# Patient Record
Sex: FEMALE | Race: WHITE | NOT HISPANIC OR LATINO | Employment: UNEMPLOYED | URBAN - METROPOLITAN AREA
[De-identification: names, ages, dates, MRNs, and addresses within clinical notes are randomized per-mention and may not be internally consistent; named-entity substitution may affect disease eponyms.]

---

## 2022-08-31 RX ORDER — SODIUM CHLORIDE 9 MG/ML
20 INJECTION, SOLUTION INTRAVENOUS ONCE
Status: CANCELLED | OUTPATIENT
Start: 2022-09-07

## 2022-10-27 ENCOUNTER — RADIATION ONCOLOGY FOLLOW-UP (OUTPATIENT)
Dept: RADIATION ONCOLOGY | Facility: CLINIC | Age: 62
End: 2022-10-27
Attending: RADIOLOGY

## 2022-10-27 VITALS
OXYGEN SATURATION: 95 % | WEIGHT: 129.41 LBS | HEIGHT: 65 IN | SYSTOLIC BLOOD PRESSURE: 144 MMHG | BODY MASS INDEX: 21.56 KG/M2 | TEMPERATURE: 98.1 F | HEART RATE: 94 BPM | DIASTOLIC BLOOD PRESSURE: 82 MMHG

## 2022-10-27 DIAGNOSIS — C50.111 MALIGNANT NEOPLASM OF CENTRAL PORTION OF RIGHT BREAST IN FEMALE, ESTROGEN RECEPTOR POSITIVE (HCC): Primary | ICD-10-CM

## 2022-10-27 DIAGNOSIS — Z17.0 MALIGNANT NEOPLASM OF CENTRAL PORTION OF RIGHT BREAST IN FEMALE, ESTROGEN RECEPTOR POSITIVE (HCC): Primary | ICD-10-CM

## 2022-10-27 NOTE — PROGRESS NOTES
Janelle Foster 1960 is a 65 y o  female History of  invasive ductal carcinoma, grade 2, ER/WA + HER2+  She was seen by Dr Slick Edmonds and Dr Karel Landaverde and Taxotere, carboplatin, Herceptin, and Perjeta was recommended followed by surgery  She started this 5/4/22  Seen for consult in Radiation Oncology on 8/16/22 9/20/22  Dr Karel Landaverde  Has completed 6 cycles of TCHP  Is receiving herceptin plus perjeta  Lumpectomy scheduled  9/30/22  Tissue Exam  A  Right breast lumpectomy:  - Invasive mammary carcinoma of no special type, at least 18 mm in greatest dimension, with associated ductal carcinoma in-situ (DCIS)  See comment and synoptic report  - Partial tumor regression noted  B  Right breast new inferior margin:  - Benign breast tissue  C  Right breast new lateral margin:  - Benign breast tissue  D  Right breast new superior margin:  - Benign breast tissue  E  Right breast new posterior margin:  - Benign breast tissue  F  Right breast new inferior margin 2:  - Benign breast tissue  G  Right breast new medial margin:  - Benign breast tissue  H  Alpha node #1 right axilla:  - Three benign lymph nodes (0/3)  I  Alpha node #6 right axilla:  - One benign lymph node (0/1)  J  Alpha node #2 right axilla:  - One benign lymph node (0/1)  K  Alpha node #3 right axilla:  - One benign lymph node (0/1)  L  Alpha node #4 right axilla:  - One benign lymph node (0/1)  M  Alpha node #5 right axilla:  - One benign lymph node (0/1)  Comment: No discrete mass was noted grossly, however residual invasive or in-situ carcinoma involves 13 of 23 blocks on the lumpectomy specimen  The largest measurable dimension of invasive carcinoma is 18 mm  Repeat hormone receptor studies are pending    1   Ancillary Studies:  Performed on original biopsy material, [id number], and reported to be:     - ER:  70-80% / Positive     - WA:  70-80% / Positive - HER2 (IHC):  3+ / Positive     10/18/22  Dr Jodee Atkisnon  Seen for surgery f/u  Margins clean Lymph nodes negative  Healing well without infection  Has appointment with radiation oncology scheduled  10/19/22  Last Infusion    Upcomin22  Hem/Onc  22  Infusion  23  Surgical Oncology                        Follow up visit     Oncology History   Malignant neoplasm of central portion of right breast in female, estrogen receptor positive ([facility] [address] )   3/29/2022 Initial Diagnosis    Malignant neoplasm of central portion of right breast in female, estrogen receptor positive ([facility] [address] )     3/29/2022 Biopsy    Final Diagnosis   A  Breast, Right, ultrasound guided needle biopsy,  12 periareolar 3 passes 12g marquee:  - Invasive mammary carcinoma of no special type (ductal)  -- Jojo histologic grade 2 of 3 (total score: 7 of 9)        * Glandular (acinar) Tubular Differentiation 10-75%, score 2        * Nuclear Pleomorphism 3 of 3, score 3         * Mitotic Rate 4-7/mm2, (8-14 mitoses/10HPF; 8 mitoses/10 HPF), score 2     -- Confirmed by tumor cell immunophenotype:        * Positive: GATA3, E-cadherin, P120 (membranous)  * Negative: p63, calponin-B, SMMHC  -- Invasive carcinoma involves 3 of 3 submitted core biopsies, max  Dimension= 14 mm     -- Estrogen, Progesterone & HER2 receptor studies pending, to be described in an addendum   - Ductal carcinoma in-situ (DCIS): Present; minor component (< 10% of total tumor)  -- Architectural Patterns: Solid, comedo  -- Nuclear grade: III (high)  -- Necrosis: Present, central comedo necrosis identified  - Lymphovascular invasion: Not identified  - Microcalcifications: Present, associated with invasive carcinoma and DCIS  - Best representative tumor block: A1      -- Sufficient tumor present for        Agendia Mammaprint/Blueprint (1 cm2 of invasive tumor in aggregate): Yes  MI Profile/Foundation One (at least 5 x 5 mm of tumor): Yes  ER 70-80%, DC 70-80%, HER2 3+ positive     4/6/2022 -  Cancer Staged    Staging form: Breast, AJCC 8th Edition  - Clinical stage from 4/6/2022: Stage IB (cT2, cN0, cM0, G2, ER+, DC+, HER2+) - Signed by Ruben Childs MD on 4/6/2022  Stage prefix: Initial diagnosis  Method of lymph node assessment: Clinical  Histologic grading system: 3 grade system       5/4/2022 -  Chemotherapy    Pegfilgrastim-bmez (ZIEXTENZO), 6 mg, Subcutaneous, Once, 6 of 6 cycles  Administration: 6 mg (5/5/2022), 6 mg (5/26/2022), 6 mg (6/16/2022), 6 mg (7/7/2022), 6 mg (7/28/2022), 6 mg (8/18/2022)  fosaprepitant (EMEND) IVPB, 150 mg, Intravenous, Once, 6 of 6 cycles  Administration: 150 mg (5/4/2022), 150 mg (5/25/2022), 150 mg (6/15/2022), 150 mg (7/6/2022), 150 mg (7/27/2022), 150 mg (8/17/2022)  pertuzumab (PERJETA) IVPB, 840 mg, Intravenous, Once, 9 of 18 cycles  Administration: 840 mg (5/4/2022), 420 mg (5/25/2022), 420 mg (9/7/2022), 420 mg (6/15/2022), 420 mg (7/6/2022), 420 mg (7/27/2022), 420 mg (8/17/2022), 420 mg (9/28/2022), 420 mg (10/19/2022)  CARBOplatin (PARAPLATIN) IVPB (GOG AUC DOSING), 588 6 mg, Intravenous, Once, 6 of 6 cycles  Administration: 588 6 mg (5/4/2022), 588 6 mg (5/25/2022), 588 6 mg (6/15/2022), 582 6 mg (7/6/2022), 588 6 mg (7/27/2022), 582 6 mg (8/17/2022)  trastuzumab-qyyp (TRAZIMERA) chemo infusion, 6 mg/kg = 330 mg (100 % of original dose 6 mg/kg), Intravenous, Once, 2 of 11 cycles  Dose modification: 6 mg/kg (original dose 6 mg/kg, Cycle 8), 6 mg/kg (original dose 6 mg/kg, Cycle 9)  Administration: 330 mg (9/28/2022), 330 mg (10/19/2022)  DOCEtaxel (TAXOTERE) chemo infusion, 75 mg/m2 = 120 mg, Intravenous, Once, 6 of 6 cycles  Administration: 120 mg (5/4/2022), 120 mg (5/25/2022), 120 mg (6/15/2022), 120 mg (7/6/2022), 120 mg (7/27/2022), 120 mg (8/17/2022)  trastuzumab (HERCEPTIN) chemo infusion, 439 mg, Intravenous, Once, 7 of 7 cycles  Administration: 450 mg (5/4/2022), 330 mg (5/25/2022), 330 mg (9/7/2022), 330 mg (6/15/2022), 330 mg (7/6/2022), 330 mg (7/27/2022), 330 mg (8/17/2022)     10/18/2022 -  Cancer Staged    Staging form: Breast, AJCC 8th Edition  - Pathologic stage from 10/18/2022: No Stage Recommended (ypT1c, pN0(sn), cM0, G2, ER+, [place]+, HER2+) - Signed by Renzo Cochran MD on 10/18/2022  Stage prefix: Post-therapy  Method of lymph node assessment: [place] lymph node biopsy  Histologic grading system: 3 grade system        Radiation                 Review of Systems:  Review of Systems   Constitutional: Positive for activity change (r/t fatigue and chemotherapy) and fatigue (fatigue improviong)  Negative for unexpected weight change  HENT: Positive for nosebleeds  Negative for sore throat  Eyes: Negative  Left eye cataract surgery with IOL   Respiratory: Negative for shortness of breath  Wheezing: controlled with allergy shots  Cardiovascular: Negative  Negative for chest pain and palpitations  Gastrointestinal: Positive for diarrhea (occasional)  Negative for abdominal distention and nausea  Endocrine: Positive for heat intolerance  Genitourinary: Positive for frequency (states she drinks a lot)  Negative for dysuria and urgency  Nocturia 2x/night   Musculoskeletal: Negative for back pain  Right big toe is numb to touch, recent injury   Skin: Negative  Bilateral lower arms are itchy  Relieved with OTC lotions  H/o dry skin   Allergic/Immunologic: Positive for environmental allergies, food allergies and immunocompromised state  Neurological: Positive for numbness (numbness in right big toe)  Negative for tremors, weakness and headaches  Hematological: Negative  Does not bruise/bleed easily  Psychiatric/Behavioral: Negative for self-injury         Clinical Trial: no    Teaching    Covid Vaccine Status vaccinated x5    Health Maintenance   Topic Date Due   • Hepatitis C Screening  Never done   • HIV Screening  Never done   • Annual Physical  Never done   • Cervical Cancer Screening  Never done   • Colorectal Cancer Screening  Never done   • Osteoporosis Screening  Never done   • Pneumococcal Vaccine: Pediatrics (0 to 5 Years) and At-Risk Patients (6 to 59 Years) (3 - PPSV23 or PCV20) 10/06/2020   • COVID-19 Vaccine (4 - Booster for Lars Pheasant series) 03/01/2022   • Breast Cancer Screening: Mammogram  03/29/2023   • Depression Screening  08/16/2023   • BMI: Adult  10/21/2023   • DTaP,Tdap,and Td Vaccines (2 - Td or Tdap) 11/16/2028   • Influenza Vaccine  Completed   • HIB Vaccine  Aged Out   • Hepatitis B Vaccine  Aged Out   • IPV Vaccine  Aged Out   • Hepatitis A Vaccine  Aged Out   • Meningococcal ACWY Vaccine  Aged Out   • HPV Vaccine  Aged Out     Patient Active Problem List   Diagnosis   • Malignant neoplasm of central portion of right breast in female, estrogen receptor positive ([address] )   • Chemotherapy induced neutropenia ([address] )   • Chemotherapy-induced nausea   • Port-A-Cath in place   • 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[address] )   • S/P chemotherapy, time since less than 4 weeks     Past Medical History:   Diagnosis Date   • Diarrhea     occasional   • Port-A-Cath in place     R side chest     Past Surgical History:   Procedure Laterality Date   • BREAST BIOPSY Right 03/29/2022    IDC   • BREAST LUMPECTOMY Right 9/30/2022    Procedure: ML BREAST;  Surgeon: Aileen Chávez MD;  Location: [facility];  Service: Surgical Oncology   • CATARACT EXTRACTION Left 2019    with lens implant   • IR PORT PLACEMENT  4/20/2022   • LYMPH NODE BIOPSY Right 9/30/2022    Procedure: SLN BX;  Surgeon: Aileen Chávez MD;  Location: [facility];  Service: Surgical Oncology   • MANDIBLE SURGERY      jaw surgery for crooked jaw 1993, 1998   • ROTATOR CUFF REPAIR Right 2020   • US BREAST ML  NEEDLE LOC RIGHT Right 3/29/2022   • US GUIDED BREAST BIOPSY RIGHT COMPLETE Right 3/29/2022     Family History   Problem Relation Age of Onset   • No Known Problems Mother    • Prostate cancer Father    • No Known Problems Sister    • No Known Problems Daughter    • No Known Problems Daughter    • Lung cancer Maternal Aunt    • No Known Problems Maternal Aunt    • No Known Problems Paternal Aunt    • No Known Problems Paternal Aunt    • No Known Problems Maternal Grandmother    • No Known Problems Maternal Grandfather    • Breast cancer Paternal Grandmother [de-identified]   • No Known Problems Paternal Grandfather    • Cancer Cousin         worked in [employer], 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 ovarian cancer   • Stomach cancer Other      Social History     Socioeconomic History   • Marital status: /Civil Union     Spouse name: Not on file   • Number of children: Not on file   • Years of education: Not on file   • Highest education level: Not on file   Occupational History   • Not on file   Tobacco Use   • Smoking status: Never Smoker   • Smokeless tobacco: Never Used   Vaping Use   • Vaping Use: Never used   Substance and Sexual Activity   • Alcohol use: Not Currently   • Drug use: Never   • Sexual activity: Not Currently   Other Topics Concern   • Not on file   Social History Narrative   • Not on file     Social Determinants of Health     Financial Resource Strain: Not on file   Food Insecurity: Not on file   Transportation Needs: Not on file   Physical Activity: Not on file   Stress: Not on file   Social Connections: Not on file   Intimate Partner Violence: Not on file   Housing Stability: Not on file       Current Outpatient Medications:   •  albuterol (ACCUNEB) 1 25 MG/3ML nebulizer solution, Take 1 25 mg by nebulization every 6 (six) hours as needed for wheezing, Disp: , Rfl:   •  albuterol (PROVENTIL HFA,VENTOLIN HFA) 90 mcg/act inhaler, Ventolin HFA 90 mcg/actuation aerosol inhaler  TAKE 2 INHALATIONS EVERY 4-6 HOURS AS NEEDEDF OR BREATHING, Disp: , Rfl:   •  lidocaine-prilocaine (EMLA) cream, Apply topically as needed for mild pain, Disp: 30 g, Rfl: 1  •  sodium chloride (OCEAN) 0 65 % nasal spray, Saline Nasal Mist, Disp: , Rfl:   •  traMADol (ULTRAM) 50 mg tablet, Take 1 tablet (50 mg total) by mouth every 8 (eight) hours as needed for moderate pain, Disp: 9 tablet, Rfl: 0  Allergies   Allergen Reactions   • Nuts - Food Allergy Hives     BRAZILIAN NUTS       • Other Other (See Comments)     Mold/gets sore throat     There were no vitals filed for this visit

## 2022-10-31 NOTE — PROGRESS NOTES
Kim [facility]  1960  [id number]    Radiation Oncology Follow Up Consult     March 8, 2022:  Bilateral screening mammograms showing a high density irregularly shaped mass with spiculated margins in the retroareolar region right with associated fine calcifications  BI-RADS category 0    March 29, 2022: Right breast mammogram and ultrasound showing again a 2 4 cm irregular hypoechoic mass at the 12 o'clock position of the right breast at anterior depth, no lymphadenopathy  Biopsy on that date showing grade 2 invasive ductal carcinoma, estrogen receptor 80%, progesterone receptor 80%, HER2 positive 3+    May 4 through August 17, 2022:  Neoadjuvant chemotherapy with Taxol, carboplatin, trastuzumab and pertuzumab  (TCHP)    September 30, 2022:  Right breast lumpectomy and sentinel node biopsy by Dr Keely Tejeda  Pathology showed grade 2 invasive ductal carcinoma measuring 1 8 cm in a single focus with treatment effect, no lymphovascular invasion seen, all margins greater than 10 mm, associated high-grade ductal carcinoma in-situ present, 8 negative sentinel nodes    History of present illness: Ms Heath Ambriz is a 71-year-old postmenopausal woman who self palpated a mass the upper central area of the right breast on February 27, 2022  She had bilateral mammograms on March 8, 2022 which confirmed presence of a new spiculated mass in the superior circumareolar region of the right breast at 12:00 p m     Additional imaging with biopsy was performed on May 29, 2022 and pathology showed a grade 2 invasive ductal carcinoma that was estrogen receptor and progesterone receptor positive, Her 2 overexpressed  She met with Dr Keely Tejeda to discuss surgical options of mastectomy versus breast conserving surgery, particularly in light of the retroareolar location of the tumor  A port was placed in the right chest wall on April 20, 2022  She met with Dr Nic Uribe in Medical Oncology    Metastatic workup with CT scan of the chest, abdomen and pelvis and bone scan showed no overt evidence of metastatic disease  She underwesnt neoadjuvant chemotherapy consisting TCHP for 6 cycles  She will continue anti-HER2 agents for full 12 months  She therefore presented with clinical stage cT2 cN0 cM0 grade 2 (stage IA) triple positive invasive ductal carcinoma of the right breast   She tolerated chemotherapy relatively well with some complaints of fatigue and minimal neuropathy  On September 30, 2022 she underwent a lumpectomy and sentinel node biopsy by Dr Shankar Montgomery  She states that she is recovering well postoperatively other than some swelling in the axilla due to postoperative seroma  Pathology showed a 1 8 cm residual grade 2 invasive ductal carcinoma with treatment effect, with no lymphovascular invasion, associated high-grade DCIS and widely negative margins of excision  Repeat receptors showed estrogen receptor 95%, progesterone receptor 95% and HER2 positive by FISH  Eight sentinel nodes were identified all of which were negative for metastasis  She returns for re-evaluation to discuss adjuvant radiation for pathologic stage ypT1c ypN0 cM0, prognostic stage IA,         Oncology History   Malignant neoplasm of central portion of right breast in female, estrogen receptor positive ([facility] [address] )   3/29/2022 Initial Diagnosis    Malignant neoplasm of central portion of right breast in female, estrogen receptor positive ([facility] [address] )     3/29/2022 Biopsy    Final Diagnosis   A  Breast, Right, ultrasound guided needle biopsy,  12 periareolar 3 passes 12g marquee:  - Invasive mammary carcinoma of no special type (ductal)      -- [place] histologic grade 2 of 3 (total score: 7 of 9)        * Glandular (acinar) Tubular Differentiation 10-75%, score 2        * Nuclear Pleomorphism 3 of 3, score 3         * Mitotic Rate 4-7/mm2, (8-14 mitoses/10HPF; 8 mitoses/10 HPF), score 2     -- Confirmed by tumor cell immunophenotype:        * Positive: GATA3, E-cadherin, P120 (membranous)  * Negative: p63, calponin-B, SMMHC  -- Invasive carcinoma involves 3 of 3 submitted core biopsies, max  Dimension= 14 mm     -- Estrogen, Progesterone & HER2 receptor studies pending, to be described in an addendum   - Ductal carcinoma in-situ (DCIS): Present; minor component (< 10% of total tumor)  -- Architectural Patterns: Solid, comedo  -- Nuclear grade: III (high)  -- Necrosis: Present, central comedo necrosis identified  - Lymphovascular invasion: Not identified  - Microcalcifications: Present, associated with invasive carcinoma and DCIS  - Best representative tumor block: A1      -- Sufficient tumor present for        Agendia Mammaprint/Blueprint (1 cm2 of invasive tumor in aggregate): Yes  MI Profile/Foundation One (at least 5 x 5 mm of tumor): Yes          ER 70-80%, IL 70-80%, HER2 3+ positive     4/6/2022 -  Cancer Staged    Staging form: Breast, AJCC 8th Edition  - Clinical stage from 4/6/2022: Stage IB (cT2, cN0, cM0, G2, ER+, IL+, HER2+) - Signed by Bishop Adriana MD on 4/6/2022  Stage prefix: Initial diagnosis  Method of lymph node assessment: Clinical  Histologic grading system: 3 grade system       5/4/2022 -  Chemotherapy    Pegfilgrastim-bmez (ZIEXTENZO), 6 mg, Subcutaneous, Once, 6 of 6 cycles  Administration: 6 mg (5/5/2022), 6 mg (5/26/2022), 6 mg (6/16/2022), 6 mg (7/7/2022), 6 mg (7/28/2022), 6 mg (8/18/2022)  fosaprepitant (EMEND) IVPB, 150 mg, Intravenous, Once, 6 of 6 cycles  Administration: 150 mg (5/4/2022), 150 mg (5/25/2022), 150 mg (6/15/2022), 150 mg (7/6/2022), 150 mg (7/27/2022), 150 mg (8/17/2022)  pertuzumab (PERJETA) IVPB, 840 mg, Intravenous, Once, 9 of 18 cycles  Administration: 840 mg (5/4/2022), 420 mg (5/25/2022), 420 mg (9/7/2022), 420 mg (6/15/2022), 420 mg (7/6/2022), 420 mg (7/27/2022), 420 mg (8/17/2022), 420 mg (9/28/2022), 420 mg (10/19/2022)  CARBOplatin (PARAPLATIN) IVPB ([facility] AUC DOSING), 588 6 mg, Intravenous, Once, 6 of 6 cycles  Administration: 588 6 mg (5/4/2022), 588 6 mg (5/25/2022), 588 6 mg (6/15/2022), 582 6 mg (7/6/2022), 588 6 mg (7/27/2022), 582 6 mg (8/17/2022)  trastuzumab-qyyp (TRAZIMERA) chemo infusion, 6 mg/kg = 330 mg (100 % of original dose 6 mg/kg), Intravenous, Once, 2 of 11 cycles  Dose modification: 6 mg/kg (original dose 6 mg/kg, Cycle 8), 6 mg/kg (original dose 6 mg/kg, Cycle 9)  Administration: 330 mg (9/28/2022), 330 mg (10/19/2022)  DOCEtaxel (TAXOTERE) chemo infusion, 75 mg/m2 = 120 mg, Intravenous, Once, 6 of 6 cycles  Administration: 120 mg (5/4/2022), 120 mg (5/25/2022), 120 mg (6/15/2022), 120 mg (7/6/2022), 120 mg (7/27/2022), 120 mg (8/17/2022)  trastuzumab (HERCEPTIN) chemo infusion, 439 mg, Intravenous, Once, 7 of 7 cycles  Administration: 450 mg (5/4/2022), 330 mg (5/25/2022), 330 mg (9/7/2022), 330 mg (6/15/2022), 330 mg (7/6/2022), 330 mg (7/27/2022), 330 mg (8/17/2022)     10/18/2022 -  Cancer Staged    Staging form: Breast, AJCC 8th Edition  - Pathologic stage from 10/18/2022: No Stage Recommended (ypT1c, pN0(sn), cM0, G2, ER+, WA+, HER2+) - Signed by Augusto Lee MD on 10/18/2022  Stage prefix: Post-therapy  Method of lymph node assessment: [place] lymph node biopsy  Histologic grading system: 3 grade system        Radiation                 Patient Active Problem List   Diagnosis   • Malignant neoplasm of central portion of right breast in female, estrogen receptor positive ([facility] [address] )   • Chemotherapy induced neutropenia (HCC)   • Chemotherapy-induced nausea   • Port-A-Cath in place   • 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[facility] [address] )   • S/P chemotherapy, time since less than 4 weeks    Cancer Staging  Malignant neoplasm of central portion of right breast in female, estrogen receptor positive ([facility] [address] )  Staging form: Breast, AJCC 8th Edition  - Clinical stage from 4/6/2022: Stage IB (cT2, cN0, cM0, G2, ER+, WA+, HER2+) - Signed by Augusto Lee MD on 4/6/2022  Stage prefix: Initial diagnosis  Method of lymph node assessment: Clinical  Histologic grading system: 3 grade system  - Pathologic stage from 10/18/2022: No Stage Recommended (ypT1c, pN0(sn), cM0, G2, ER+, MN+, HER2+) - Signed by Albaro Grace MD on 10/18/2022  Stage prefix: Post-therapy  Method of lymph node assessment: [place] lymph node biopsy  Histologic grading system: 3 grade system    Past Medical History:   Diagnosis Date   • Breast cancer ([facility] [address] )    • Diarrhea     occasional   • Port-A-Cath in place     R side chest     Social History     Socioeconomic History   • Marital status: /Civil Union     Spouse name: Not on file   • Number of children: Not on file   • Years of education: Not on file   • Highest education level: Not on file   Occupational History   • Not on file   Tobacco Use   • Smoking status: Never Smoker   • Smokeless tobacco: Never Used   Vaping Use   • Vaping Use: Never used   Substance and Sexual Activity   • Alcohol use: Not Currently   • Drug use: Never   • Sexual activity: Not Currently   Other Topics Concern   • Not on file   Social History Narrative   • Not on file     Social Determinants of Health     Financial Resource Strain: Not on file   Food Insecurity: Not on file   Transportation Needs: Not on file   Physical Activity: Not on file   Stress: Not on file   Social Connections: Not on file   Intimate Partner Violence: Not on file   Housing Stability: Not on file      Family History   Problem Relation Age of Onset   • No Known Problems Mother    • Prostate cancer Father    • No Known Problems Sister    • No Known Problems Daughter    • No Known Problems Daughter    • Lung cancer Maternal Aunt    • No Known Problems Maternal Aunt    • No Known Problems Paternal Aunt    • No Known Problems Paternal Aunt    • No Known Problems Maternal Grandmother    • No Known Problems Maternal Grandfather    • Breast cancer Paternal Grandmother [de-identified]   • No Known Problems Paternal Grandfather    • Cancer Cousin         worked in radiology, 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 ovarian cancer   • Stomach cancer Other      Past Surgical History:   Procedure Laterality Date   • BREAST BIOPSY Right 03/29/2022    IDC   • BREAST LUMPECTOMY Right 9/30/2022    Procedure: ML BREAST;  Surgeon: Raquel Bonilla MD;  Location: [facility];  Service: Surgical Oncology   • CATARACT EXTRACTION Left 2019    with lens implant   • IR PORT PLACEMENT  4/20/2022   • LYMPH NODE BIOPSY Right 9/30/2022    Procedure: SLN BX;  Surgeon: Raquel Bonilla MD;  Location: [facility];  Service: Surgical Oncology   • MANDIBLE SURGERY      jaw surgery for crooked jaw 1993, 1998   • ROTATOR CUFF REPAIR Right 2020   • US BREAST ML  NEEDLE LOC RIGHT Right 3/29/2022   • US GUIDED BREAST BIOPSY RIGHT COMPLETE Right 3/29/2022       Current Outpatient Medications:   •  lidocaine-prilocaine (EMLA) cream, Apply topically as needed for mild pain, Disp: 30 g, Rfl: 1  •  sodium chloride (OCEAN) 0 65 % nasal spray, Saline Nasal Mist, Disp: , Rfl:   •  albuterol (ACCUNEB) 1 25 MG/3ML nebulizer solution, Take 1 25 mg by nebulization every 6 (six) hours as needed for wheezing (Patient not taking: Reported on 10/27/2022), Disp: , Rfl:   •  albuterol (PROVENTIL HFA,VENTOLIN HFA) 90 mcg/act inhaler, Ventolin HFA 90 mcg/actuation aerosol inhaler  TAKE 2 INHALATIONS EVERY 4-6 HOURS AS NEEDEDF OR BREATHING (Patient not taking: Reported on 10/27/2022), Disp: , Rfl:   •  traMADol (ULTRAM) 50 mg tablet, Take 1 tablet (50 mg total) by mouth every 8 (eight) hours as needed for moderate pain (Patient not taking: Reported on 10/27/2022), Disp: 9 tablet, Rfl: 0  Allergies   Allergen Reactions   • Nuts - Food Allergy Hives     BRAZILIAN NUTS       • Other Other (See Comments)     Mold/gets sore throat       Review of Systems:  Constitutional: Positive for activity change (r/t fatigue and chemotherapy) and fatigue (fatigue improviong)  Negative for unexpected weight change  HENT: Positive for nosebleeds   Negative for sore throat  Eyes: Negative  Left eye cataract surgery with IOL   Respiratory: Negative for shortness of breath  Wheezing: controlled with allergy shots  Cardiovascular: Negative  Negative for chest pain and palpitations  Gastrointestinal: Positive for diarrhea (occasional)  Negative for abdominal distention and nausea  Endocrine: Positive for heat intolerance  Genitourinary: Positive for frequency (states she drinks a lot)  Negative for dysuria and urgency  Nocturia 2x/night   Musculoskeletal: Negative for back pain  Right big toe is numb to touch, recent injury   Skin: Negative  Bilateral lower arms are itchy  Relieved with OTC lotions  H/o dry skin   Allergic/Immunologic: Positive for environmental allergies, food allergies and immunocompromised state  Neurological: Positive for numbness (numbness in right big toe)  Negative for tremors, weakness and headaches  Hematological: Negative  Does not bruise/bleed easily  Psychiatric/Behavioral: Negative for self-injury       Physical Exam:  Physical Exam  Vitals and nursing note reviewed  Constitutional:       General: She is not in acute distress  Appearance: Normal appearance  HENT:      Head: Normocephalic and atraumatic  Eyes:      General: No scleral icterus  Extraocular Movements: Extraocular movements intact  Pupils: Pupils are equal, round, and reactive to light  Pulmonary:      Effort: Pulmonary effort is normal  No respiratory distress  Chest:   Breasts:      Right: No swelling, inverted nipple, mass, nipple discharge, skin change, axillary adenopathy or supraclavicular adenopathy  Left: Normal  No swelling, inverted nipple, mass, nipple discharge, skin change, axillary adenopathy or supraclavicular adenopathy              Comments: Right breast:  Well-healing lumpectomy incision, well-healing sentinel node incision with 2 cm palpable firm seroma in the axilla  Abdominal: General: There is no distension  Tenderness: There is no abdominal tenderness  Musculoskeletal:      Right lower leg: No edema  Left lower leg: No edema  Lymphadenopathy:      Cervical: No cervical adenopathy  Upper Body:      Right upper body: No supraclavicular or axillary adenopathy  Left upper body: No supraclavicular or axillary adenopathy  Skin:     Coloration: Skin is not jaundiced  Neurological:      General: No focal deficit present  Mental Status: She is alert and oriented to person, place, and time  Cranial Nerves: No cranial nerve deficit  Motor: No weakness  Psychiatric:         Attention and Perception: Attention normal          Mood and Affect: Mood is anxious  Speech: Speech normal          LABS:    CBC  Diff   Lab Results   Component Value Date/Time    WBC 3 85 (L) 10/19/2022 12:53 PM    HGB 12 1 10/19/2022 12:53 PM    HCT 37 2 10/19/2022 12:53 PM    RBC 3 74 (L) 10/19/2022 12:53 PM     (H) 10/19/2022 12:53 PM    MCHC 32 5 10/19/2022 12:53 PM    MCH 32 4 10/19/2022 12:53 PM    RDW 11 5 (L) 10/19/2022 12:53 PM    MPV 9 1 10/19/2022 12:53 PM    Lab Results   Component Value Date/Time    LYMPHSABS 1 01 10/19/2022 12:53 PM    EOSABS 0 33 10/19/2022 12:53 PM    MONOSABS 0 30 10/19/2022 12:53 PM    BASOSABS 0 07 10/19/2022 12:53 PM        Basic Metabolic Profile    Lab Results   Component Value Date/Time    SODIUM 138 10/19/2022 12:53 PM    CO2 30 10/19/2022 12:53 PM    Lab Results   Component Value Date/Time    BUN 17 10/19/2022 12:53 PM    CREATININE 0 65 10/19/2022 12:53 PM          Discussion/Summary:  Ms Kya Kebede is a 61-year-old postmenopausal woman who presented with a mammographic abnormality in the right breast, biopsy showing triple positive grade 2 invasive ductal carcinoma  She was treated with neoadjuvant chemotherapy and anti-HER2 therapy with a partial clinical and pathologic response to neoadjuvant treatment    She underwent lumpectomy and sentinel node biopsy showing 1 8 cm of residual disease, negative nodes and negative margins of excision  She has been recovering well from surgery with a seroma in the axilla that has not required drainage  She returns for information regarding radiation therapy for a pathologic stage ypT1c ypN0 cM0, prognostic stage I A  I discussed with the patient and her  that radiation is used to eradicate microscopic deposits of disease which may reside in the breast after chemotherapy and surgery  Radiation therefore reduces the risk of local recurrence and contributes to overall survival benefit  She has risk factors for local recurrence including residual disease after neoadjuvant chemotherapy, HER2 positive disease, and the presence of high-grade ductal carcinoma in-situ in the specimen  She does have widely negative margins and negative sentinel nodes  I recommend radiation to the right whole breast with a tumor bed boost   I described the procedure involved in radiation to the right breast including the planning procedure  I described the potential acute and long-term side effects  Acutely she may experience some localized skin irritation, swelling in the breast or fatigue  Late side effects primarily include the possibility of fibrosis formation which can lead to changes in cosmesis or texture  I did explain that we will not be treating any of her lymph nodes as she was both clinically and pathologically node negative  She has extensive questions about lymphedema which I explained are related to the use of axillary surgery but will not be contributed to by the whole breast radiation  The patient was in agreement with proceeding with right breast radiation  Informed consent was reviewed by me and signed by the patient today  CT simulation was scheduled

## 2022-11-01 ENCOUNTER — APPOINTMENT (OUTPATIENT)
Dept: RADIATION ONCOLOGY | Facility: CLINIC | Age: 62
End: 2022-11-01
Attending: RADIOLOGY

## 2022-11-02 RX ORDER — SODIUM CHLORIDE 9 MG/ML
20 INJECTION, SOLUTION INTRAVENOUS ONCE
Status: CANCELLED | OUTPATIENT
Start: 2022-11-09

## 2022-11-04 ENCOUNTER — TELEPHONE (OUTPATIENT)
Dept: SURGICAL ONCOLOGY | Facility: CLINIC | Age: 62
End: 2022-11-04

## 2022-11-04 NOTE — TELEPHONE ENCOUNTER
----- Message from Ruben Childs MD sent at 11/4/2022  3:50 PM EDT -----  yes  ----- Message -----  From: Micha Pepper RN  Sent: 11/4/2022   2:59 PM EDT  To: Ruben Childs MD     Pt would like to know if she may resume her exercising  She is currently having RT

## 2022-11-04 NOTE — TELEPHONE ENCOUNTER
Dr Ant Hu gave the "OK" for Ev Horta to re-start her exercising  Discussed with Ev Horta and instructed her to start out slowly and not to overdue  She understands

## 2022-11-08 ENCOUNTER — OFFICE VISIT (OUTPATIENT)
Dept: HEMATOLOGY ONCOLOGY | Facility: CLINIC | Age: 62
End: 2022-11-08

## 2022-11-08 ENCOUNTER — DOCUMENTATION (OUTPATIENT)
Dept: HEMATOLOGY ONCOLOGY | Facility: CLINIC | Age: 62
End: 2022-11-08

## 2022-11-08 VITALS
OXYGEN SATURATION: 98 % | RESPIRATION RATE: 16 BRPM | WEIGHT: 128 LBS | DIASTOLIC BLOOD PRESSURE: 70 MMHG | BODY MASS INDEX: 21.33 KG/M2 | HEIGHT: 65 IN | SYSTOLIC BLOOD PRESSURE: 118 MMHG | HEART RATE: 76 BPM | TEMPERATURE: 98.1 F

## 2022-11-08 DIAGNOSIS — T45.1X5A CHEMOTHERAPY INDUCED CARDIOMYOPATHY (HCC): ICD-10-CM

## 2022-11-08 DIAGNOSIS — Z95.828 PORT-A-CATH IN PLACE: ICD-10-CM

## 2022-11-08 DIAGNOSIS — Z17.0 MALIGNANT NEOPLASM OF CENTRAL PORTION OF RIGHT BREAST IN FEMALE, ESTROGEN RECEPTOR POSITIVE (HCC): Primary | ICD-10-CM

## 2022-11-08 DIAGNOSIS — C50.111 MALIGNANT NEOPLASM OF CENTRAL PORTION OF RIGHT BREAST IN FEMALE, ESTROGEN RECEPTOR POSITIVE (HCC): Primary | ICD-10-CM

## 2022-11-08 DIAGNOSIS — M81.8 OSTEOPOROSIS DUE TO AROMATASE INHIBITOR: ICD-10-CM

## 2022-11-08 DIAGNOSIS — T38.6X5A OSTEOPOROSIS DUE TO AROMATASE INHIBITOR: ICD-10-CM

## 2022-11-08 DIAGNOSIS — R11.2 CHEMOTHERAPY INDUCED NAUSEA AND VOMITING: ICD-10-CM

## 2022-11-08 DIAGNOSIS — T45.1X5A CHEMOTHERAPY INDUCED NAUSEA AND VOMITING: ICD-10-CM

## 2022-11-08 DIAGNOSIS — I42.7 CHEMOTHERAPY INDUCED CARDIOMYOPATHY (HCC): ICD-10-CM

## 2022-11-08 RX ORDER — LETROZOLE 2.5 MG/1
2.5 TABLET, FILM COATED ORAL DAILY
Qty: 30 TABLET | Refills: 5 | Status: SHIPPED | OUTPATIENT
Start: 2022-11-08

## 2022-11-08 NOTE — PROGRESS NOTES
Reviewed Femara handout with Pt and spouse  Gave Pt a copy of Femara, Arimidex and Aromasin handouts  Instructed possible side effects and importance of reporting any symptoms  Pt signed consent for all 3 AI  Gave pt my direct number to call with any questions

## 2022-11-08 NOTE — PATIENT INSTRUCTIONS
Continuing with Herceptin and Perjeta every 3 weeks  Blood work every 6 weeks  Information on Arimidex, Femara and Aromasin and will start her on Femara 2 5 mg daily  Ordered bone density test   Patient will start taking 1 calcium with vitamin-D and extra 2000 units of vitamin D3 daily  Port flush to continue  Follow-up in 2 months

## 2022-11-08 NOTE — PROGRESS NOTES
HPI:  Patient is here with her   Follow-up visit for T2 NX triple positive right breast cancer that was diagnosed in  March 2022, 2 5 cm tumor mass with negative axilla on ultrasound and negative distant metastatic disease  Path  report was invasive mammary carcinoma, grade 2, ER 70-80% and AR 70-80% and HER2 3+ positive  Patient received 6 cycles of TCHP chemotherapy  Patient had lumpectomy and sentinel lymph node sampling on 09/30/2022 and there was residual 1 8 cm invasive cancer  Margins were clear  All  8 sentinel lymph nodes were negative for metastatic disease  Patient will be receiving radiation  Patient  is being continued on  Herceptin plus Perjeta and is going to be started on aromatase inhibitor   Negative genetic testing for significant variant  No history of cancers in her family  Patient is less nervous and anxious now than before  Has some tiredness  ROS:   Reviewed 12 systems: See symptoms in HPI  Presently no other neurological, cardiac, pulmonary, GI and  symptoms other than listed in HPI  Other symptoms are in HPI  No  fever, chills, bleeding, bone pains, skin rash, weight loss, night sweats, arthritic symptoms,   weakness, numbness, claudication and gait problem  No frequent infections  Not unusually sensitive to heat or cold  No swelling of the ankles  No swollen glands  Patient is less anxious  Physical Exam:  Vitals:  Weight 58 kg  Temperature 98 1 degrees  Respirations 16  Blood pressure 118/70 and oxygen saturation 98%      Alert and oriented and not in distress   Vitals are above   No icterus , no oral thrush, no palpable neck mass, clear   lung fields   to percussion and auscultation , regular heart rate, abdomen  soft and non tender   , no palpable abdominal mass, no ascites, no edema of ankles, no calf tenderness, no focal neurological deficit, no skin rash, no palpable lymphadenopathy in the neck and axillary areas,  no clubbing     Patient is anxious  Performance status 1  No lymphedema  Historical Information   Past Medical History:   Diagnosis Date   • Breast cancer ([address] )    • Diarrhea     occasional   • Port-A-Cath in place     R side chest     Past Surgical History:   Procedure Laterality Date   • BREAST BIOPSY Right 03/29/2022    IDC   • BREAST LUMPECTOMY Right 9/30/2022    Procedure: ML BREAST;  Surgeon: Amairani Kang MD;  [facility];  Service: Surgical Oncology   • CATARACT EXTRACTION Left 2019    with lens implant   • IR PORT PLACEMENT  4/20/2022   • LYMPH NODE BIOPSY Right 9/30/2022    Procedure: SLN BX;  Surgeon: Amairani Kang MD;  [facility];  Service: Surgical Oncology   • MANDIBLE SURGERY      jaw surgery for crooked jaw 1993, 1998   • ROTATOR CUFF REPAIR Right 2020   • [address]  NEEDLE LOC RIGHT Right 3/29/2022   • US GUIDED BREAST BIOPSY RIGHT COMPLETE Right 3/29/2022     Social History   Social History     Substance and Sexual Activity   Alcohol Use Not Currently     Social History     Substance and Sexual Activity   Drug Use Never     Social History     Tobacco Use   Smoking Status Never Smoker   Smokeless Tobacco Never Used     Family History:   Family History   Problem Relation Age of Onset   • No Known Problems Mother    • Prostate cancer Father    • No Known Problems Sister    • No Known Problems Daughter    • No Known Problems Daughter    • Lung cancer Maternal Aunt    • No Known Problems Maternal Aunt    • No Known Problems Paternal Aunt    • No Known Problems Paternal Aunt    • No Known Problems Maternal Grandmother    • No Known Problems Maternal Grandfather    • Breast cancer Paternal Grandmother [de-identified]   • No Known Problems Paternal Grandfather    • Cancer Cousin         worked in radiology, 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 ovarian cancer   • Stomach cancer Other          Current Outpatient Medications:   •  albuterol (ACCUNEB) 1 25 MG/3ML nebulizer solution, Take 1 25 mg by nebulization every 6 (six) hours as needed for wheezing, Disp: , Rfl:   •  albuterol (PROVENTIL HFA,VENTOLIN HFA) 90 mcg/act inhaler, , Disp: , Rfl:   •  lidocaine-prilocaine (EMLA) cream, Apply topically as needed for mild pain, Disp: 30 g, Rfl: 1  •  sodium chloride (OCEAN) 0 65 % nasal spray, Saline Nasal Mist, Disp: , Rfl:   •  traMADol (ULTRAM) 50 mg tablet, Take 1 tablet (50 mg total) by mouth every 8 (eight) hours as needed for moderate pain, Disp: 9 tablet, Rfl: 0    Allergies   Allergen Reactions   • Nuts - Food Allergy Hives     BRAZILIAN NUTS       • Other Other (See Comments)     Mold/gets sore throat             Lab Results: I have reviewed all pertinent labs  LABS:    Results for orders placed or performed during the hospital encounter of 10/21/22   Echo follow up/limited w/ contrast if indicated   Result Value Ref Range    A4C EF 58 %    LV Diastolic Volume (BP) 111 mL    LV Systolic Volume (BP) 46 mL    EF 59 %    LVIDd 4 30 cm    LVIDS 2 70 cm    IVSd 0 80 cm    LVPWd 0 90 cm    FS 37 28 - 44    Left ventricular stroke volume (2D) 56 00 mL    IVS 0 8 cm    LEFT VENTRICLE SYSTOLIC VOLUME (MOD BIPLANE) 2D 27 mL    LV DIASTOLIC VOLUME (MOD BIPLANE) 2D 83 mL    LVSV, 2D 56 mL    LV EF 60     GLS -18 %         Imaging Studies: I have personally reviewed pertinent reports  IMPRESSION:     1  No scintigraphic evidence of osseous metastasis           Workstation performed: [id number]          Imaging    NM bone scan whole body (Order: [id number]) - 4/22/2022  OSSEOUS STRUCTURES:  No acute fracture or destructive osseous lesion  Old right rib fractures  Degenerative changes, most significant at the right shoulder  L4-L5 anterolisthesis  Lumbar levocurvature    Left transitional lumbosacral segment      IMPRESSION:     Right breast lesion consistent with known malignancy      Left adrenal gland thickening, attention on follow-up      3 mm left upper lobe pulmonary nodule      Additional findings as above      The study was marked in EPIC for significant notification         Workstation performed: [id number]          Imaging    CT chest abdomen pelvis w contrast (Order: [id number]) - 4/21/2022      Reviewed above test results and discussed with patient and her     Assessment and Plan:  See diagnoses, orders instructions below    Patient is here with her   Follow-up visit for T2 NX triple positive right breast cancer that was diagnosed in  March 2022, 2 5 cm tumor mass with negative axilla on ultrasound and negative distant metastatic disease  Path  report was invasive mammary carcinoma, grade 2, ER 70-80% and TX 70-80% and HER2 3+ positive  Patient received 6 cycles of TCHP chemotherapy  Patient had lumpectomy and sentinel lymph node sampling on 09/30/2022 and there was residual 1 8 cm invasive cancer  Margins were clear  All  8 sentinel lymph nodes were negative for metastatic disease  Patient will be receiving radiation  Patient  is being continued on  Herceptin plus Perjeta and is going to be started on aromatase inhibitor   Negative genetic testing for significant variant  No history of cancers in her family  Patient is less nervous and anxious now than before  Has some tiredness  Physical examination and test results are as recorded and discussed in detail  Plan is as above, radiation, continuation of Herceptin and Perjeta and to start on aromatase inhibitor  Patient received information on all 3 aromatase inhibitors  We also talked about tamoxifen  Patient signed informed consent for aromatase inhibitors and she will be started on letrozole 2 5 mg daily  She will start taking 1 tablet of calcium with vitamin-D and extra vitamin D3   2000 units daily  She will have bone density test   She will continue to have echocardiogram every 3 months  Discussed all this with patient in detail    Questions answered  Patient  has Port-A-Cath and she gets that flushed  Discussed importance of self-breast examination, eating healthy foods, staying active and health screening test   Patient is capable of self-care  Discussed precautions against coronavirus and flu  Goal of therapy will be cure if possible   All discussed in very much detail  Questions answered  1  Malignant neoplasm of central portion of right breast in female, estrogen receptor positive ([facility] )    - CBC and differential; Standing  - Comprehensive metabolic panel; Standing  - CBC and differential  - Comprehensive metabolic panel    2  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[facility] )      3  Port-A-Cath in place      4  Chemotherapy induced nausea and vomiting    Continuing with Herceptin and Perjeta every 3 weeks  Blood work every 6 weeks  Information on Arimidex, Femara and Aromasin and will start her on Femara 2 5 mg daily  Ordered bone density test   Patient will start taking 1 calcium with vitamin-D and extra 2000 units of vitamin D3 daily  Port flush to continue  Follow-up in 2 months  Patient will continue to follow with her primary physician and other consultants  Patient voiced understanding and agrees     This note has been generated by voice recognition software  Therefore there maybe spelling, grammar, and/or syntax errors  Please contact if questions arise  Counseling / Coordination of Care    Cathy Torres   Provided counseling and support

## 2022-11-09 ENCOUNTER — HOSPITAL ENCOUNTER (OUTPATIENT)
Dept: INFUSION CENTER | Facility: CLINIC | Age: 62
Discharge: HOME | End: 2022-11-09

## 2022-11-09 ENCOUNTER — PATIENT OUTREACH (OUTPATIENT)
Dept: HEMATOLOGY ONCOLOGY | Facility: CLINIC | Age: 62
End: 2022-11-09

## 2022-11-09 VITALS
HEIGHT: 65 IN | RESPIRATION RATE: 18 BRPM | TEMPERATURE: 97.3 F | DIASTOLIC BLOOD PRESSURE: 90 MMHG | BODY MASS INDEX: 22.11 KG/M2 | SYSTOLIC BLOOD PRESSURE: 137 MMHG | HEART RATE: 88 BPM | WEIGHT: 132.72 LBS

## 2022-11-09 DIAGNOSIS — Z17.0 MALIGNANT NEOPLASM OF CENTRAL PORTION OF RIGHT BREAST IN FEMALE, ESTROGEN RECEPTOR POSITIVE (HCC): Primary | ICD-10-CM

## 2022-11-09 DIAGNOSIS — C50.111 MALIGNANT NEOPLASM OF CENTRAL PORTION OF RIGHT BREAST IN FEMALE, ESTROGEN RECEPTOR POSITIVE (HCC): Primary | ICD-10-CM

## 2022-11-09 RX ORDER — SODIUM CHLORIDE 9 MG/ML
20 INJECTION, SOLUTION INTRAVENOUS ONCE
Status: COMPLETED | OUTPATIENT
Start: 2022-11-09 | End: 2022-11-09

## 2022-11-09 RX ADMIN — SODIUM CHLORIDE 20 ML/HR: 0.9 INJECTION, SOLUTION INTRAVENOUS at 13:08

## 2022-11-09 RX ADMIN — SODIUM CHLORIDE 330 MG: 0.9 INJECTION, SOLUTION INTRAVENOUS at 13:48

## 2022-11-09 RX ADMIN — PERTUZUMAB 420 MG: 30 INJECTION, SOLUTION, CONCENTRATE INTRAVENOUS at 13:09

## 2022-11-09 NOTE — PROGRESS NOTES
I reached out to patient to check in and see how she is doing  A detailed message was left  including my contact information

## 2022-11-09 NOTE — PROGRESS NOTES
Patient arrived to the unit and denied infection and SOB  She tolerated her treatment well without adverse reaction  She is aware of her next infusion

## 2022-11-09 NOTE — PLAN OF CARE
Problem: INFECTION - ADULT  Goal: Absence or prevention of progression during hospitalization  Description: INTERVENTIONS:  - Assess and monitor for signs and symptoms of infection  - Monitor lab/diagnostic results  - Monitor all insertion sites, i e  indwelling lines, tubes, and drains  - Monitor endotracheal if appropriate and nasal secretions for changes in amount and color  - [place] appropriate cooling/warming therapies per order  - Administer medications as ordered  - Instruct and encourage patient and family to use good hand hygiene technique  - Identify and instruct in appropriate isolation precautions for identified infection/condition  Outcome: Progressing

## 2022-11-10 ENCOUNTER — APPOINTMENT (OUTPATIENT)
Dept: RADIATION ONCOLOGY | Facility: CLINIC | Age: 62
End: 2022-11-10
Attending: RADIOLOGY

## 2022-11-24 RX ORDER — SODIUM CHLORIDE 9 MG/ML
20 INJECTION, SOLUTION INTRAVENOUS ONCE
Status: CANCELLED | OUTPATIENT
Start: 2022-11-30

## 2022-11-30 ENCOUNTER — HOSPITAL ENCOUNTER (OUTPATIENT)
Dept: INFUSION CENTER | Facility: CLINIC | Age: 62
Discharge: HOME | End: 2022-11-30

## 2022-11-30 VITALS
WEIGHT: 131.17 LBS | BODY MASS INDEX: 21.83 KG/M2 | SYSTOLIC BLOOD PRESSURE: 124 MMHG | DIASTOLIC BLOOD PRESSURE: 77 MMHG | RESPIRATION RATE: 18 BRPM | TEMPERATURE: 98.4 F | HEART RATE: 93 BPM

## 2022-11-30 DIAGNOSIS — Z17.0 MALIGNANT NEOPLASM OF CENTRAL PORTION OF RIGHT BREAST IN FEMALE, ESTROGEN RECEPTOR POSITIVE (HCC): Primary | ICD-10-CM

## 2022-11-30 DIAGNOSIS — C50.111 MALIGNANT NEOPLASM OF CENTRAL PORTION OF RIGHT BREAST IN FEMALE, ESTROGEN RECEPTOR POSITIVE (HCC): ICD-10-CM

## 2022-11-30 DIAGNOSIS — C50.111 MALIGNANT NEOPLASM OF CENTRAL PORTION OF RIGHT BREAST IN FEMALE, ESTROGEN RECEPTOR POSITIVE (HCC): Primary | ICD-10-CM

## 2022-11-30 DIAGNOSIS — Z95.828 PORT-A-CATH IN PLACE: ICD-10-CM

## 2022-11-30 DIAGNOSIS — Z17.0 MALIGNANT NEOPLASM OF CENTRAL PORTION OF RIGHT BREAST IN FEMALE, ESTROGEN RECEPTOR POSITIVE (HCC): ICD-10-CM

## 2022-11-30 RX ORDER — LIDOCAINE AND PRILOCAINE 25; 25 MG/G; MG/G
CREAM TOPICAL AS NEEDED
Qty: 30 G | Refills: 1 | Status: SHIPPED | OUTPATIENT
Start: 2022-11-30

## 2022-11-30 RX ORDER — SODIUM CHLORIDE 9 MG/ML
20 INJECTION, SOLUTION INTRAVENOUS ONCE
Status: COMPLETED | OUTPATIENT
Start: 2022-11-30 | End: 2022-11-30

## 2022-11-30 RX ADMIN — SODIUM CHLORIDE 20 ML/HR: 0.9 INJECTION, SOLUTION INTRAVENOUS at 12:18

## 2022-11-30 RX ADMIN — SODIUM CHLORIDE 361 MG: 0.9 INJECTION, SOLUTION INTRAVENOUS at 13:31

## 2022-11-30 RX ADMIN — PERTUZUMAB 420 MG: 30 INJECTION, SOLUTION, CONCENTRATE INTRAVENOUS at 12:49

## 2022-11-30 NOTE — PROGRESS NOTES
Patient arrived on unit for treatment today  Denies any present issues/concerns  Cleared for treatment  Tolerated Perjeta and Herceptin without incident  Aware of next appointment   DEclined AVS

## 2022-12-01 ENCOUNTER — APPOINTMENT (OUTPATIENT)
Dept: RADIATION ONCOLOGY | Facility: CLINIC | Age: 62
End: 2022-12-01
Attending: RADIOLOGY

## 2022-12-01 ENCOUNTER — HOSPITAL ENCOUNTER (OUTPATIENT)
Dept: INFUSION CENTER | Facility: CLINIC | Age: 62
Discharge: HOME | End: 2022-12-01

## 2022-12-01 DIAGNOSIS — Z95.828 PORT-A-CATH IN PLACE: ICD-10-CM

## 2022-12-01 DIAGNOSIS — C50.111 MALIGNANT NEOPLASM OF CENTRAL PORTION OF RIGHT BREAST IN FEMALE, ESTROGEN RECEPTOR POSITIVE (HCC): Primary | ICD-10-CM

## 2022-12-01 DIAGNOSIS — Z17.0 MALIGNANT NEOPLASM OF CENTRAL PORTION OF RIGHT BREAST IN FEMALE, ESTROGEN RECEPTOR POSITIVE (HCC): Primary | ICD-10-CM

## 2022-12-01 LAB
ALBUMIN SERPL BCP-MCNC: 3.8 G/DL (ref 3.5–5)
ALP SERPL-CCNC: 50 U/L (ref 46–116)
ALT SERPL W P-5'-P-CCNC: 24 U/L (ref 12–78)
ANION GAP SERPL CALCULATED.3IONS-SCNC: 8 MMOL/L (ref 4–13)
AST SERPL W P-5'-P-CCNC: 25 U/L (ref 5–45)
BASOPHILS # BLD AUTO: 0.08 THOUSANDS/ÂΜL (ref 0–0.1)
BASOPHILS NFR BLD AUTO: 2 % (ref 0–1)
BILIRUB SERPL-MCNC: 0.28 MG/DL (ref 0.2–1)
BUN SERPL-MCNC: 19 MG/DL (ref 5–25)
CALCIUM SERPL-MCNC: 8.8 MG/DL (ref 8.3–10.1)
CHLORIDE SERPL-SCNC: 101 MMOL/L (ref 96–108)
CO2 SERPL-SCNC: 26 MMOL/L (ref 21–32)
CREAT SERPL-MCNC: 0.56 MG/DL (ref 0.6–1.3)
EOSINOPHIL # BLD AUTO: 0.38 THOUSAND/ÂΜL (ref 0–0.61)
EOSINOPHIL NFR BLD AUTO: 9 % (ref 0–6)
ERYTHROCYTE [DISTWIDTH] IN BLOOD BY AUTOMATED COUNT: 11.4 % (ref 11.6–15.1)
GFR SERPL CREATININE-BSD FRML MDRD: 100 ML/MIN/1.73SQ M
GLUCOSE SERPL-MCNC: 89 MG/DL (ref 65–140)
HCT VFR BLD AUTO: 38.2 % (ref 34.8–46.1)
HGB BLD-MCNC: 12.3 G/DL (ref 11.5–15.4)
IMM GRANULOCYTES # BLD AUTO: 0.02 THOUSAND/UL (ref 0–0.2)
IMM GRANULOCYTES NFR BLD AUTO: 1 % (ref 0–2)
LYMPHOCYTES # BLD AUTO: 0.84 THOUSANDS/ÂΜL (ref 0.6–4.47)
LYMPHOCYTES NFR BLD AUTO: 20 % (ref 14–44)
MCH RBC QN AUTO: 31.1 PG (ref 26.8–34.3)
MCHC RBC AUTO-ENTMCNC: 32.2 G/DL (ref 31.4–37.4)
MCV RBC AUTO: 97 FL (ref 82–98)
MONOCYTES # BLD AUTO: 0.3 THOUSAND/ÂΜL (ref 0.17–1.22)
MONOCYTES NFR BLD AUTO: 7 % (ref 4–12)
NEUTROPHILS # BLD AUTO: 2.68 THOUSANDS/ÂΜL (ref 1.85–7.62)
NEUTS SEG NFR BLD AUTO: 61 % (ref 43–75)
NRBC BLD AUTO-RTO: 0 /100 WBCS
PLATELET # BLD AUTO: 231 THOUSANDS/UL (ref 149–390)
PMV BLD AUTO: 9.3 FL (ref 8.9–12.7)
POTASSIUM SERPL-SCNC: 4.2 MMOL/L (ref 3.5–5.3)
PROT SERPL-MCNC: 7.1 G/DL (ref 6.4–8.4)
RBC # BLD AUTO: 3.96 MILLION/UL (ref 3.81–5.12)
SODIUM SERPL-SCNC: 135 MMOL/L (ref 135–147)
WBC # BLD AUTO: 4.3 THOUSAND/UL (ref 4.31–10.16)

## 2022-12-02 ENCOUNTER — APPOINTMENT (OUTPATIENT)
Dept: RADIATION ONCOLOGY | Facility: CLINIC | Age: 62
End: 2022-12-02
Attending: RADIOLOGY

## 2022-12-05 ENCOUNTER — APPOINTMENT (OUTPATIENT)
Dept: RADIATION ONCOLOGY | Facility: CLINIC | Age: 62
End: 2022-12-05
Attending: RADIOLOGY

## 2022-12-06 ENCOUNTER — APPOINTMENT (OUTPATIENT)
Dept: RADIATION ONCOLOGY | Facility: CLINIC | Age: 62
End: 2022-12-06
Attending: RADIOLOGY

## 2022-12-07 ENCOUNTER — APPOINTMENT (OUTPATIENT)
Dept: RADIATION ONCOLOGY | Facility: CLINIC | Age: 62
End: 2022-12-07
Attending: RADIOLOGY

## 2022-12-08 ENCOUNTER — APPOINTMENT (OUTPATIENT)
Dept: RADIATION ONCOLOGY | Facility: CLINIC | Age: 62
End: 2022-12-08
Attending: RADIOLOGY

## 2022-12-09 ENCOUNTER — TELEPHONE (OUTPATIENT)
Dept: RADIATION ONCOLOGY | Facility: CLINIC | Age: 62
End: 2022-12-09

## 2022-12-09 ENCOUNTER — APPOINTMENT (OUTPATIENT)
Dept: RADIATION ONCOLOGY | Facility: CLINIC | Age: 62
End: 2022-12-09
Attending: RADIOLOGY

## 2022-12-09 NOTE — TELEPHONE ENCOUNTER
Patient called and wants to know if it is okay that she swims laps now that she is done with radiation  She would like a call back

## 2022-12-12 ENCOUNTER — APPOINTMENT (OUTPATIENT)
Dept: RADIATION ONCOLOGY | Facility: CLINIC | Age: 62
End: 2022-12-12

## 2022-12-13 ENCOUNTER — HOSPITAL ENCOUNTER (OUTPATIENT)
Dept: BONE DENSITY | Facility: CLINIC | Age: 62
Discharge: HOME | End: 2022-12-13

## 2022-12-13 DIAGNOSIS — T38.6X5A OSTEOPOROSIS DUE TO AROMATASE INHIBITOR: ICD-10-CM

## 2022-12-13 DIAGNOSIS — M81.8 OSTEOPOROSIS DUE TO AROMATASE INHIBITOR: ICD-10-CM

## 2022-12-14 RX ORDER — SODIUM CHLORIDE 9 MG/ML
20 INJECTION, SOLUTION INTRAVENOUS ONCE
Status: CANCELLED | OUTPATIENT
Start: 2022-12-21

## 2022-12-15 ENCOUNTER — APPOINTMENT (OUTPATIENT)
Dept: RADIATION ONCOLOGY | Facility: CLINIC | Age: 62
End: 2022-12-15

## 2022-12-21 ENCOUNTER — HOSPITAL ENCOUNTER (OUTPATIENT)
Dept: INFUSION CENTER | Facility: CLINIC | Age: 62
Discharge: HOME | End: 2022-12-21

## 2022-12-21 VITALS — WEIGHT: 129.63 LBS | BODY MASS INDEX: 21.57 KG/M2

## 2022-12-21 DIAGNOSIS — Z17.0 MALIGNANT NEOPLASM OF CENTRAL PORTION OF RIGHT BREAST IN FEMALE, ESTROGEN RECEPTOR POSITIVE (HCC): Primary | ICD-10-CM

## 2022-12-21 DIAGNOSIS — C50.111 MALIGNANT NEOPLASM OF CENTRAL PORTION OF RIGHT BREAST IN FEMALE, ESTROGEN RECEPTOR POSITIVE (HCC): Primary | ICD-10-CM

## 2022-12-21 RX ORDER — SODIUM CHLORIDE 9 MG/ML
20 INJECTION, SOLUTION INTRAVENOUS ONCE
Status: COMPLETED | OUTPATIENT
Start: 2022-12-21 | End: 2022-12-21

## 2022-12-21 RX ADMIN — SODIUM CHLORIDE 361 MG: 0.9 INJECTION, SOLUTION INTRAVENOUS at 13:14

## 2022-12-21 RX ADMIN — SODIUM CHLORIDE 20 ML/HR: 0.9 INJECTION, SOLUTION INTRAVENOUS at 12:25

## 2022-12-21 RX ADMIN — PERTUZUMAB 420 MG: 30 INJECTION, SOLUTION, CONCENTRATE INTRAVENOUS at 12:41

## 2022-12-29 ENCOUNTER — PATIENT OUTREACH (OUTPATIENT)
Dept: HEMATOLOGY ONCOLOGY | Facility: CLINIC | Age: 62
End: 2022-12-29

## 2022-12-29 NOTE — PROGRESS NOTES
I called patient to check in and see how everything is going   A detailed message along with my contact information was left

## 2023-01-04 RX ORDER — SODIUM CHLORIDE 9 MG/ML
20 INJECTION, SOLUTION INTRAVENOUS ONCE
Status: CANCELLED | OUTPATIENT
Start: 2023-01-11

## 2023-01-05 ENCOUNTER — CLINICAL SUPPORT (OUTPATIENT)
Dept: RADIATION ONCOLOGY | Facility: CLINIC | Age: 63
End: 2023-01-05
Attending: RADIOLOGY

## 2023-01-05 ENCOUNTER — RADIATION ONCOLOGY FOLLOW-UP (OUTPATIENT)
Dept: RADIATION ONCOLOGY | Facility: CLINIC | Age: 63
End: 2023-01-05
Attending: RADIOLOGY

## 2023-01-05 VITALS
SYSTOLIC BLOOD PRESSURE: 122 MMHG | BODY MASS INDEX: 21.93 KG/M2 | RESPIRATION RATE: 18 BRPM | HEIGHT: 65 IN | DIASTOLIC BLOOD PRESSURE: 75 MMHG | WEIGHT: 131.61 LBS | OXYGEN SATURATION: 97 % | TEMPERATURE: 97.2 F | HEART RATE: 86 BPM

## 2023-01-05 DIAGNOSIS — Z17.0 MALIGNANT NEOPLASM OF CENTRAL PORTION OF RIGHT BREAST IN FEMALE, ESTROGEN RECEPTOR POSITIVE (HCC): Primary | ICD-10-CM

## 2023-01-05 DIAGNOSIS — C50.111 MALIGNANT NEOPLASM OF CENTRAL PORTION OF RIGHT BREAST IN FEMALE, ESTROGEN RECEPTOR POSITIVE (HCC): Primary | ICD-10-CM

## 2023-01-05 NOTE — PROGRESS NOTES
Js Miller 1960 is a 65 y o  female with triple positive invasive ductal carcinoma of the right breast  She is s/p neoadjuvant chemo followed by lumpectomy and sentinel lymph node biopsy  She completed radiation to the right breast 12/9/22  She returns today for her first follow-up  1/11/23 infusion (Herceptin plus Perjeta)  1/17/23 Dr Dejan Mathew  3/30/23 diagnostic bilateral mammogram  4/19/23 Harding Comment NP      Follow up visit     Oncology History   Malignant neoplasm of central portion of right breast in female, estrogen receptor positive ([facility] )   3/29/2022 Initial Diagnosis    Malignant neoplasm of central portion of right breast in female, estrogen receptor positive ([facility] )     3/29/2022 Biopsy    Final Diagnosis   A  Breast, Right, ultrasound guided needle biopsy,  12 periareolar 3 passes 12g marquee:  - Invasive mammary carcinoma of no special type (ductal)  -- Admire histologic grade 2 of 3 (total score: 7 of 9)        * Glandular (acinar) Tubular Differentiation 10-75%, score 2        * Nuclear Pleomorphism 3 of 3, score 3         * Mitotic Rate 4-7/mm2, (8-14 mitoses/10HPF; 8 mitoses/10 HPF), score 2     -- Confirmed by tumor cell immunophenotype:        * Positive: GATA3, E-cadherin, P120 (membranous)  * Negative: p63, calponin-B, SMMHC  -- Invasive carcinoma involves 3 of 3 submitted core biopsies, max  Dimension= 14 mm     -- Estrogen, Progesterone & HER2 receptor studies pending, to be described in an addendum   - Ductal carcinoma in-situ (DCIS): Present; minor component (< 10% of total tumor)  -- Architectural Patterns: Solid, comedo  -- Nuclear grade: III (high)  -- Necrosis: Present, central comedo necrosis identified  - Lymphovascular invasion: Not identified  - Microcalcifications: Present, associated with invasive carcinoma and DCIS    - Best representative tumor block: A1      -- Sufficient tumor present for        Agendia Mammaprint/Blueprint (1 cm2 of invasive tumor in aggregate): Yes  MI Profile/Foundation One (at least 5 x 5 mm of tumor): Yes          ER 70-80%, UT 70-80%, HER2 3+ positive     4/6/2022 -  Cancer Staged    Staging form: Breast, AJCC 8th Edition  - Clinical stage from 4/6/2022: Stage IB (cT2, cN0, cM0, G2, ER+, UT+, HER2+) - Signed by Son Baron MD on 4/6/2022  Stage prefix: Initial diagnosis  Method of lymph node assessment: Clinical  Histologic grading system: 3 grade system       5/4/2022 -  Chemotherapy    Pegfilgrastim-bmez (ZIEXTENZO), 6 mg, Subcutaneous, Once, 6 of 6 cycles  Administration: 6 mg (5/5/2022), 6 mg (5/26/2022), 6 mg (6/16/2022), 6 mg (7/7/2022), 6 mg (7/28/2022), 6 mg (8/18/2022)  fosaprepitant (EMEND) IVPB, 150 mg, Intravenous, Once, 6 of 6 cycles  Administration: 150 mg (5/4/2022), 150 mg (5/25/2022), 150 mg (6/15/2022), 150 mg (7/6/2022), 150 mg (7/27/2022), 150 mg (8/17/2022)  pertuzumab (PERJETA) IVPB, 840 mg, Intravenous, Once, 12 of 18 cycles  Administration: 840 mg (5/4/2022), 420 mg (5/25/2022), 420 mg (9/7/2022), 420 mg (6/15/2022), 420 mg (7/6/2022), 420 mg (7/27/2022), 420 mg (8/17/2022), 420 mg (9/28/2022), 420 mg (10/19/2022), 420 mg (11/9/2022), 420 mg (11/30/2022), 420 mg (12/21/2022)  CARBOplatin (PARAPLATIN) IVPB (GOG AUC DOSING), 588 6 mg, Intravenous, Once, 6 of 6 cycles  Administration: 588 6 mg (5/4/2022), 588 6 mg (5/25/2022), 588 6 mg (6/15/2022), 582 6 mg (7/6/2022), 588 6 mg (7/27/2022), 582 6 mg (8/17/2022)  trastuzumab-qyyp (TRAZIMERA) chemo infusion, 6 mg/kg = 330 mg (100 % of original dose 6 mg/kg), Intravenous, Once, 5 of 11 cycles  Dose modification: 6 mg/kg (original dose 6 mg/kg, Cycle 8), 6 mg/kg (original dose 6 mg/kg, Cycle 9), 6 mg/kg (original dose 6 mg/kg, Cycle 12)  Administration: 330 mg (9/28/2022), 330 mg (10/19/2022), 330 mg (11/9/2022), 361 mg (11/30/2022), 361 mg (12/21/2022)  DOCEtaxel (TAXOTERE) chemo infusion, 75 mg/m2 = 120 mg, Intravenous, Once, 6 of 6 cycles  Administration: 120 mg (5/4/2022), 120 mg (5/25/2022), 120 mg (6/15/2022), 120 mg (7/6/2022), 120 mg (7/27/2022), 120 mg (8/17/2022)  trastuzumab (HERCEPTIN) chemo infusion, 439 mg, Intravenous, Once, 7 of 7 cycles  Administration: 450 mg (5/4/2022), 330 mg (5/25/2022), 330 mg (9/7/2022), 330 mg (6/15/2022), 330 mg (7/6/2022), 330 mg (7/27/2022), 330 mg (8/17/2022)     10/18/2022 -  Cancer Staged    Staging form: Breast, AJCC 8th Edition  - Pathologic stage from 10/18/2022: No Stage Recommended (ypT1c, pN0(sn), cM0, G2, ER+, OK+, HER2+) - Signed by Alok Welsh MD on 10/18/2022  Stage prefix: Post-therapy  Method of lymph node assessment: [place] lymph node biopsy  Histologic grading system: 3 grade system       11/11/2022 - 12/9/2022 Radiation    Plan ID Energy Fractions Dose per Fraction (cGy) Dose Correction (cGy) Total Dose Delivered (cGy) Elapsed Days   R Breast 6X 16 / 16 266 0 4,256 24   R Brst Boost 12E 4 / 4 250 0 1,000 3            Review of Systems:  Review of Systems   Constitutional: Negative  HENT: Negative  Eyes: Negative  Respiratory: Negative  Cardiovascular: Negative  Gastrointestinal: Negative  Endocrine: Negative  Genitourinary: Negative  Musculoskeletal: Negative  Skin: Negative  Itching bilateral arms   Allergic/Immunologic: Negative  Neurological: Negative  Hematological: Negative  Psychiatric/Behavioral: Negative          Clinical Trial: no      Health Maintenance   Topic Date Due   • Hepatitis C Screening  Never done   • Hepatitis B Vaccine (1 of 3 - 3-dose series) Never done   • HIV Screening  Never done   • Annual Physical  Never done   • Cervical Cancer Screening  Never done   • Colorectal Cancer Screening  Never done   • Pneumococcal Vaccine: Pediatrics (0 to 5 Years) and At-Risk Patients (6 to 59 Years) (3 - PPSV23 if available, else PCV20) 10/06/2020   • COVID-19 Vaccine (5 - Booster for Sascha Graven series) 06/22/2022   • Breast Cancer Screening: Mammogram  03/08/2023   • BMI: Adult  12/21/2023   • Depression Screening  01/05/2024   • DTaP,Tdap,and Td Vaccines (2 - Td or Tdap) 11/16/2028   • Osteoporosis Screening  Completed   • Influenza Vaccine  Completed   • HIB Vaccine  Aged Out   • IPV Vaccine  Aged Out   • Hepatitis A Vaccine  Aged Out   • Meningococcal ACWY Vaccine  Aged Out   • HPV Vaccine  Aged Out     Patient Active Problem List   Diagnosis   • Malignant neoplasm of central portion of right breast in female, estrogen receptor positive ([facility] )   • Chemotherapy induced neutropenia ([facility] )   • Chemotherapy-induced nausea   • Port-A-Cath in place   • 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[facility] )   • S/P chemotherapy, time since less than 4 weeks     Past Medical History:   Diagnosis Date   • Breast cancer ([facility] )    • Diarrhea     occasional   • Port-A-Cath in place     R side chest     Past Surgical History:   Procedure Laterality Date   • BREAST BIOPSY Right 03/29/2022    IDC   • BREAST LUMPECTOMY Right 9/30/2022    Procedure: ML BREAST;  Surgeon: Star Lucero MD;  [facility];  Service: Surgical Oncology   • CATARACT EXTRACTION Left 2019    with lens implant   • IR PORT PLACEMENT  4/20/2022   • LYMPH NODE BIOPSY Right 9/30/2022    Procedure: SLN BX;  Surgeon: Star Lucero MD;  [facility];  Service: Surgical Oncology   • MANDIBLE SURGERY      jaw surgery for crooked jaw 1993, 1998   • ROTATOR CUFF REPAIR Right 2020   • US BREAST ML  NEEDLE LOC RIGHT Right 3/29/2022   • US GUIDED BREAST BIOPSY RIGHT COMPLETE Right 3/29/2022     Family History   Problem Relation Age of Onset   • No Known Problems Mother    • Prostate cancer Father    • No Known Problems Sister    • No Known Problems Daughter    • No Known Problems Daughter    • Lung cancer Maternal Aunt    • No Known Problems Maternal Aunt    • No Known Problems Paternal Aunt    • No Known Problems Paternal Aunt    • No Known Problems Maternal Grandmother    • No Known Problems Maternal Grandfather    • Breast cancer Paternal Grandmother [de-identified]   • No Known Problems Paternal Grandfather    • Cancer Cousin         worked in radiology, 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 ovarian cancer   • Stomach cancer Other      Social History     Socioeconomic History   • Marital status: /Civil Union     Spouse name: Not on file   • Number of children: Not on file   • Years of education: Not on file   • Highest education level: Not on file   Occupational History   • Not on file   Tobacco Use   • Smoking status: Never   • Smokeless tobacco: Never   Vaping Use   • Vaping Use: Never used   Substance and Sexual Activity   • Alcohol use: Not Currently   • Drug use: Never   • Sexual activity: Not Currently   Other Topics Concern   • Not on file   Social History Narrative   • Not on file     Social Determinants of Health     Financial Resource Strain: Not on file   Food Insecurity: Not on file   Transportation Needs: Not on file   Physical Activity: Not on file   Stress: Not on file   Social Connections: Not on file   Intimate Partner Violence: Not on file   Housing Stability: Not on file       Current Outpatient Medications:   •  albuterol (ACCUNEB) 1 25 MG/3ML nebulizer solution, Take 1 25 mg by nebulization every 6 (six) hours as needed for wheezing, Disp: , Rfl:   •  albuterol (PROVENTIL HFA,VENTOLIN HFA) 90 mcg/act inhaler, , Disp: , Rfl:   •  letrozole (FEMARA) 2 5 mg tablet, Take 1 tablet (2 5 mg total) by mouth daily, Disp: 30 tablet, Rfl: 5  •  lidocaine-prilocaine (EMLA) cream, Apply topically as needed for mild pain, Disp: 30 g, Rfl: 1  •  sodium chloride (OCEAN) 0 65 % nasal spray, Saline Nasal Mist, Disp: , Rfl:   •  traMADol (ULTRAM) 50 mg tablet, Take 1 tablet (50 mg total) by mouth every 8 (eight) hours as needed for moderate pain (Patient not taking: Reported on 1/5/2023), Disp: 9 tablet, Rfl: 0  Allergies   Allergen Reactions   • Nuts - Food Allergy Hives     BRAZILIAN NUTS       • Other Other (See Comments)     Mold/gets sore throat     Vitals:    01/05/23 1414   BP: 122/75   Pulse: 86   Resp: 18   Temp: (!) 97 2 °F (36 2 °C)   SpO2: 97%   Weight: 59 7 kg (131 lb 9 8 oz)   Height: 5' 5" (1 651 m)      Pain Score: 0-No pain

## 2023-01-10 NOTE — PROGRESS NOTES
Nazario Norbert  1960  [id number]    Radiation Oncology Follow Up    March 8, 2022:  Bilateral screening mammograms showing a high density irregularly shaped mass with spiculated margins in the retroareolar region right with associated fine calcifications  BI-RADS category 0     March 29, 2022: Right breast mammogram and ultrasound showing again a 2 4 cm irregular hypoechoic mass at the 12 o'clock position of the right breast at anterior depth, no lymphadenopathy  Biopsy on that date showing grade 2 invasive ductal carcinoma, estrogen receptor 80%, progesterone receptor 80%, HER2 positive 3+     May 4 through August 17, 2022:  Neoadjuvant chemotherapy with Taxol, carboplatin, trastuzumab and pertuzumab  (TCHP)     September 30, 2022:  Right breast lumpectomy and sentinel node biopsy by Dr Kaylie Cuellar  Pathology showed grade 2 invasive ductal carcinoma measuring 1 8 cm in a single focus with treatment effect, no lymphovascular invasion seen, all margins greater than 10 mm, associated high-grade ductal carcinoma in-situ present, 8 negative sentinel nodes     History of present illness: Ms Espinoza Bustos is a 61-year-old postmenopausal woman who self palpated a mass the upper central area of the right breast on February 27, 2022   She had bilateral mammograms on March 8, 2022 which confirmed presence of a new spiculated mass in the superior circumareolar region of the right breast at 12:00 p m     Additional imaging with biopsy was performed on May 29, 2022 and pathology showed a grade 2 invasive ductal carcinoma that was estrogen receptor and progesterone receptor positive, Her 2 overexpressed   She met with Dr Kaylie Cuellar to discuss surgical options of mastectomy versus breast conserving surgery, particularly in light of the retroareolar location of the tumor   A port was placed in the right chest wall on April 20, 2022   She met with Dr Encarnacion Severe workup with CT scan of the chest, abdomen and pelvis and bone scan showed no overt evidence of metastatic disease  She underwesnt neoadjuvant chemotherapy consisting TCHP for 6 cycles  She will continue anti-HER2 agents for full 12 months  She therefore presented with clinical stage cT2 cN0 cM0 grade 2 (stage IA) triple positive invasive ductal carcinoma of the right breast   She tolerated chemotherapy relatively well with some complaints of fatigue and minimal neuropathy  On September 30, 2022 she underwent a lumpectomy and sentinel node biopsy by Dr Zach Ingram  She states that she is recovering well postoperatively other than some swelling in the axilla due to postoperative seroma  Pathology showed a 1 8 cm residual grade 2 invasive ductal carcinoma with treatment effect, with no lymphovascular invasion, associated high-grade DCIS and widely negative margins of excision  Repeat receptors showed estrogen receptor 95%, progesterone receptor 95% and HER2 positive by FISH  Eight sentinel nodes were identified all of which were negative for metastasis  Pathologic stage ypT1c ypN0 cM0, prognostic stage IA  She completed radiation to the right whole breast to a total dose of 4256 cGy in 16 fractions followed by a tumor bed boost using electrons of 8000 cGy in 4 fractions between November 11 and December 9, 2022  She tolerated radiation with expected skin toxicity including erythema treated with topical agents as required  She has been recovering well from any acute effects of radiation treatment and has no significant residual skin toxicity at this time  She otherwise denies any swelling in the upper extremities and has good range of motion  She denies any cough, shortness of breath or chest wall pain  She had a DEXA scan since completing treatment that showed osteopenia in the femoral neck  She has had some discussion with her medical oncologist regarding supplemental calcium and vitamin D and starting bisphosphonate therapy          Oncology History Malignant neoplasm of central portion of right breast in female, estrogen receptor positive ([facility] [address] )   3/29/2022 Initial Diagnosis    Malignant neoplasm of central portion of right breast in female, estrogen receptor positive ([facility] [address] )     3/29/2022 Biopsy    Final Diagnosis   A  Breast, Right, ultrasound guided needle biopsy,  12 periareolar 3 passes 12g marquee:  - Invasive mammary carcinoma of no special type (ductal)  -- [place] histologic grade 2 of 3 (total score: 7 of 9)        * Glandular (acinar) Tubular Differentiation 10-75%, score 2        * Nuclear Pleomorphism 3 of 3, score 3         * Mitotic Rate 4-7/mm2, (8-14 mitoses/10HPF; 8 mitoses/10 HPF), score 2     -- Confirmed by tumor cell immunophenotype:        * Positive: GATA3, E-cadherin, P120 (membranous)  * Negative: p63, calponin-B, SMMHC  -- Invasive carcinoma involves 3 of 3 submitted core biopsies, max  Dimension= 14 mm     -- Estrogen, Progesterone & HER2 receptor studies pending, to be described in an addendum   - Ductal carcinoma in-situ (DCIS): Present; minor component (< 10% of total tumor)  -- Architectural Patterns: Solid, comedo  -- Nuclear grade: III (high)  -- Necrosis: Present, central comedo necrosis identified  - Lymphovascular invasion: Not identified  - Microcalcifications: Present, associated with invasive carcinoma and DCIS  - Best representative tumor block: A1      -- Sufficient tumor present for        Agendia Mammaprint/Blueprint (1 cm2 of invasive tumor in aggregate): Yes  MI Profile/Foundation One (at least 5 x 5 mm of tumor): Yes          ER 70-80%, OK 70-80%, HER2 3+ positive     4/6/2022 -  Cancer Staged    Staging form: Breast, AJCC 8th Edition  - Clinical stage from 4/6/2022: Stage IB (cT2, cN0, cM0, G2, ER+, OK+, HER2+) - Signed by Jesus Lira MD on 4/6/2022  Stage prefix: Initial diagnosis  Method of lymph node assessment: Clinical  Histologic grading system: 3 grade system 5/4/2022 -  Chemotherapy    Pegfilgrastim-bmez (ZIEXTENZO), 6 mg, Subcutaneous, Once, 6 of 6 cycles  Administration: 6 mg (5/5/2022), 6 mg (5/26/2022), 6 mg (6/16/2022), 6 mg (7/7/2022), 6 mg (7/28/2022), 6 mg (8/18/2022)  fosaprepitant (EMEND) IVPB, 150 mg, Intravenous, Once, 6 of 6 cycles  Administration: 150 mg (5/4/2022), 150 mg (5/25/2022), 150 mg (6/15/2022), 150 mg (7/6/2022), 150 mg (7/27/2022), 150 mg (8/17/2022)  pertuzumab (PERJETA) IVPB, 840 mg, Intravenous, Once, 12 of 18 cycles  Administration: 840 mg (5/4/2022), 420 mg (5/25/2022), 420 mg (9/7/2022), 420 mg (6/15/2022), 420 mg (7/6/2022), 420 mg (7/27/2022), 420 mg (8/17/2022), 420 mg (9/28/2022), 420 mg (10/19/2022), 420 mg (11/9/2022), 420 mg (11/30/2022), 420 mg (12/21/2022)  CARBOplatin (PARAPLATIN) IVPB (GOG AUC DOSING), 588 6 mg, Intravenous, Once, 6 of 6 cycles  Administration: 588 6 mg (5/4/2022), 588 6 mg (5/25/2022), 588 6 mg (6/15/2022), 582 6 mg (7/6/2022), 588 6 mg (7/27/2022), 582 6 mg (8/17/2022)  trastuzumab-qyyp (TRAZIMERA) chemo infusion, 6 mg/kg = 330 mg (100 % of original dose 6 mg/kg), Intravenous, Once, 5 of 11 cycles  Dose modification: 6 mg/kg (original dose 6 mg/kg, Cycle 8), 6 mg/kg (original dose 6 mg/kg, Cycle 9), 6 mg/kg (original dose 6 mg/kg, Cycle 12)  Administration: 330 mg (9/28/2022), 330 mg (10/19/2022), 330 mg (11/9/2022), 361 mg (11/30/2022), 361 mg (12/21/2022)  DOCEtaxel (TAXOTERE) chemo infusion, 75 mg/m2 = 120 mg, Intravenous, Once, 6 of 6 cycles  Administration: 120 mg (5/4/2022), 120 mg (5/25/2022), 120 mg (6/15/2022), 120 mg (7/6/2022), 120 mg (7/27/2022), 120 mg (8/17/2022)  trastuzumab (HERCEPTIN) chemo infusion, 439 mg, Intravenous, Once, 7 of 7 cycles  Administration: 450 mg (5/4/2022), 330 mg (5/25/2022), 330 mg (9/7/2022), 330 mg (6/15/2022), 330 mg (7/6/2022), 330 mg (7/27/2022), 330 mg (8/17/2022)     10/18/2022 -  Cancer Staged    Staging form: Breast, AJCC 8th Edition  - Pathologic stage from 10/18/2022: No Stage Recommended (ypT1c, pN0(sn), cM0, G2, ER+, KS+, HER2+) - Signed by Juni Chan MD on 10/18/2022  Stage prefix: Post-therapy  Method of lymph node assessment: [place] lymph node biopsy  Histologic grading system: 3 grade system       11/11/2022 - 12/9/2022 Radiation    Plan ID Energy Fractions Dose per Fraction (cGy) Dose Correction (cGy) Total Dose Delivered (cGy) Elapsed Days   R Breast 6X 16 / 16 266 0 4,256 24   R Brst Boost 12E 4 / 4 250 0 1,000 3            Patient Active Problem List   Diagnosis   • Malignant neoplasm of central portion of right breast in female, estrogen receptor positive ([address] )   • Chemotherapy induced neutropenia (HCC)   • Chemotherapy-induced nausea   • Port-A-Cath in place   • 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HCC)   • S/P chemotherapy, time since less than 4 weeks    Cancer Staging   Malignant neoplasm of central portion of right breast in female, estrogen receptor positive ([facility] [address] )  Staging form: Breast, AJCC 8th Edition  - Clinical stage from 4/6/2022: Stage IB (cT2, cN0, cM0, G2, ER+, KS+, HER2+) - Signed by Juni Chan MD on 4/6/2022  Stage prefix: Initial diagnosis  Method of lymph node assessment: Clinical  Histologic grading system: 3 grade system  - Pathologic stage from 10/18/2022: No Stage Recommended (ypT1c, pN0(sn), cM0, G2, ER+, KS+, HER2+) - Signed by Juni Chan MD on 10/18/2022  Stage prefix: Post-therapy  Method of lymph node assessment: [place] lymph node biopsy  Histologic grading system: 3 grade system    Past Medical History:   Diagnosis Date   • Breast cancer ([address] )    • Diarrhea     occasional   • Port-A-Cath in place     R side chest     Social History     Socioeconomic History   • Marital status: /Civil Union     Spouse name: Not on file   • Number of children: Not on file   • Years of education: Not on file   • Highest education level: Not on file   Occupational History   • Not on file   Tobacco Use   • Smoking status: Never   • Smokeless tobacco: Never   Vaping Use   • Vaping Use: Never used   Substance and Sexual Activity   • Alcohol use: Not Currently   • Drug use: Never   • Sexual activity: Not Currently   Other Topics Concern   • Not on file   Social History Narrative   • Not on file     Social Determinants of Health     Financial Resource Strain: Not on file   Food Insecurity: Not on file   Transportation Needs: Not on file   Physical Activity: Not on file   Stress: Not on file   Social Connections: Not on file   Intimate Partner Violence: Not on file   Housing Stability: Not on file     Family History   Problem Relation Age of Onset   • No Known Problems Mother    • Prostate cancer Father    • No Known Problems Sister    • No Known Problems Daughter    • No Known Problems Daughter    • Lung cancer Maternal Aunt    • No Known Problems Maternal Aunt    • No Known Problems Paternal Aunt    • No Known Problems Paternal Aunt    • No Known Problems Maternal Grandmother    • No Known Problems Maternal Grandfather    • Breast cancer Paternal Grandmother [de-identified]   • No Known Problems Paternal Grandfather    • Cancer Cousin         worked in radiology, 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 ovarian cancer   • Stomach cancer Other      Past Surgical History:   Procedure Laterality Date   • BREAST BIOPSY Right 03/29/2022    IDC   • BREAST LUMPECTOMY Right 9/30/2022    Procedure: ML BREAST;  Surgeon: Dorothy Sol MD;  Location: [facility];  Service: Surgical Oncology   • CATARACT EXTRACTION Left 2019    with lens implant   • IR PORT PLACEMENT  4/20/2022   • LYMPH NODE BIOPSY Right 9/30/2022    Procedure: SLN BX;  Surgeon: Dorothy Sol MD;  Location: [facility];  Service: Surgical Oncology   • MANDIBLE SURGERY      jaw surgery for crooked jaw 1993, 1998   • ROTATOR CUFF REPAIR Right 2020   • US BREAST ML  NEEDLE LOC RIGHT Right 3/29/2022   • US GUIDED BREAST BIOPSY RIGHT COMPLETE Right 3/29/2022       Current Outpatient Medications:   •  albuterol (ACCUNEB) 1 25 MG/3ML nebulizer solution, Take 1 25 mg by nebulization every 6 (six) hours as needed for wheezing, Disp: , Rfl:   •  albuterol (PROVENTIL HFA,VENTOLIN HFA) 90 mcg/act inhaler, , Disp: , Rfl:   •  letrozole (FEMARA) 2 5 mg tablet, Take 1 tablet (2 5 mg total) by mouth daily, Disp: 30 tablet, Rfl: 5  •  lidocaine-prilocaine (EMLA) cream, Apply topically as needed for mild pain, Disp: 30 g, Rfl: 1  •  sodium chloride (OCEAN) 0 65 % nasal spray, Saline Nasal Mist, Disp: , Rfl:   •  traMADol (ULTRAM) 50 mg tablet, Take 1 tablet (50 mg total) by mouth every 8 (eight) hours as needed for moderate pain (Patient not taking: Reported on 1/5/2023), Disp: 9 tablet, Rfl: 0  Allergies   Allergen Reactions   • Nuts - Food Allergy Hives     BRAZILIAN NUTS       • Other Other (See Comments)     Mold/gets sore throat       Review of Systems:  Constitutional: Negative  HENT: Negative  Eyes: Negative  Respiratory: Negative  Cardiovascular: Negative  Gastrointestinal: Negative  Endocrine: Negative  Genitourinary: Negative  Musculoskeletal: Negative  Skin: Negative  Itching bilateral arms   Allergic/Immunologic: Negative  Neurological: Negative  Hematological: Negative  Psychiatric/Behavioral: Negative  Physical Exam:  Physical Exam  Vitals and nursing note reviewed  Constitutional:       General: She is not in acute distress  Appearance: Normal appearance  HENT:      Nose: No congestion  Eyes:      General: No scleral icterus  Extraocular Movements: Extraocular movements intact  Pupils: Pupils are equal, round, and reactive to light  Pulmonary:      Effort: Pulmonary effort is normal  No respiratory distress  Chest:   Breasts:     Right: No swelling, inverted nipple, mass, nipple discharge or skin change  Left: Normal  No swelling, inverted nipple, mass or nipple discharge  Abdominal:      General: Abdomen is flat  There is no distension  Musculoskeletal:         General: No swelling  Normal range of motion  Cervical back: Normal range of motion  Lymphadenopathy:      Cervical: No cervical adenopathy  Upper Body:      Right upper body: No supraclavicular or axillary adenopathy  Left upper body: No supraclavicular or axillary adenopathy  Skin:     General: Skin is warm and dry  Findings: No erythema  Neurological:      General: No focal deficit present  Mental Status: She is alert and oriented to person, place, and time  Psychiatric:         Mood and Affect: Mood normal          Thought Content: Thought content normal          Judgment: Judgment normal          Imaging:DXA bone density spine hip and pelvis    Result Date: 12/14/2022  Narrative: DXA SCAN CLINICAL HISTORY: 65 years postmenopausal female   OTHER RISK FACTORS:  Letrozole therapy  PHARMACOLOGIC THERAPY FOR OSTEOPOROSIS:  None  TECHNIQUE: Bone densitometry was performed using a cacaoTVs W  bone densitometer  Regions of interest appear properly placed  COMPARISON: There are no prior DXA studies performed on this unit for comparison  RESULTS: LUMBAR SPINE Level: L1-L3  (L4 vertebra excluded from analysis due to local structural abnormalities or artifact) : BMD:  1 139  gm/cm2 T-score: 1 1 LEFT  TOTAL HIP: BMD:  0 843  gm/cm2 T-score:  -0 8 LEFT  FEMORAL NECK: BMD:  0 589  gm/cm2 T score: -2 3     Impression: 1  Low bone mass (osteopenia)  [Based on the left femoral neck] 2  The 10 year risk of hip fracture is 1 6% with the 10 year risk of major osteoporotic fracture being 9 8% as calculated by the [facility] fracture risk assessment tool (FRAX, which is based on data generated by the [facility] Metabolic Bone Diseases)   3   The current NOF guidelines recommend treating patients with a T-score of -2 5 or less in the lumbar spine or hips, or in post-menopausal women and men over the age of 48 with low bone mass (osteopenia) and a FRAX 10 year risk score of >3% for hip fracture and/or >20% for major osteoporotic fracture  4   The NOF recommends follow-up DXA in 1-2 years after initiating therapy for osteoporosis and every 2 years thereafter  More frequent evaluation is appropriate for patients with conditions associated with rapid bone loss, such as glucocorticoid therapy  The interval between DXA screenings may be longer for individuals without major risk factors and initial T-score in the normal or upper low bone mass range  The FRAX algorithm has certain limitations: -FRAX has not been validated in patients currently or previously treated with pharmacotherapy for osteoporosis  In such patients, clinical judgment must be exercised in interpreting FRAX scores  -Prior hip, vertebral and humeral fragility fractures appear to confer greater risk of subsequent fracture than fractures at other sites (this is especially true for individuals with severe vertebral fractures), but quantification of this incremental risk is not possible with FRAX  -FRAX underestimates fracture risk in patients with history of multiple fragility fractures  -FRAX may underestimate fracture risk in patients with history of frequent falls  -It is not appropriate to use FRAX to monitor treatment response   WHO CLASSIFICATION: Normal (a T-score of -1 0 or higher) Low bone mineral density (a T-score of less than -1 0 but higher than -2 5) Osteoporosis (a T-score of -2 5 or less) Severe osteoporosis (a T-score of -2 5 or less with a fragility fracture) Workstation performed: [id number]         LABS:    CBC  Diff   Lab Results   Component Value Date/Time    WBC 4 30 (L) 12/01/2022 12:27 PM    HGB 12 3 12/01/2022 12:27 PM    HCT 38 2 12/01/2022 12:27 PM    RBC 3 96 12/01/2022 12:27 PM    MCV 97 12/01/2022 12:27 PM    MCHC 32 2 12/01/2022 12:27 PM    MCH 31 1 12/01/2022 12:27 PM    RDW 11 4 (L) 12/01/2022 12:27 PM    MPV 9 3 12/01/2022 12:27 PM    Lab Results Component Value Date/Time    LYMPHSABS 0 84 12/01/2022 12:27 PM    EOSABS 0 38 12/01/2022 12:27 PM    MONOSABS 0 30 12/01/2022 12:27 PM    BASOSABS 0 08 12/01/2022 12:27 PM        Basic Metabolic Profile    Lab Results   Component Value Date/Time    SODIUM 135 12/01/2022 12:27 PM    CO2 26 12/01/2022 12:27 PM    Lab Results   Component Value Date/Time    BUN 19 12/01/2022 12:27 PM    CREATININE 0 56 (L) 12/01/2022 12:27 PM            Discussion/Summary: Ms Saleh Homes seen for routine follow-up visit 1 month after completing treatment  She has no clinical evidence of recurrent disease on examination today  She is recovering well from any acute effects of radiation therapy  She will have routine follow-up mammograms in 5 months and we will see her for examination at that time

## 2023-01-11 ENCOUNTER — HOSPITAL ENCOUNTER (OUTPATIENT)
Dept: INFUSION CENTER | Facility: CLINIC | Age: 63
Discharge: HOME | End: 2023-01-11

## 2023-01-11 VITALS
WEIGHT: 131.61 LBS | TEMPERATURE: 98.4 F | SYSTOLIC BLOOD PRESSURE: 117 MMHG | HEART RATE: 84 BPM | DIASTOLIC BLOOD PRESSURE: 53 MMHG | HEIGHT: 65 IN | BODY MASS INDEX: 21.93 KG/M2 | RESPIRATION RATE: 16 BRPM | OXYGEN SATURATION: 98 %

## 2023-01-11 DIAGNOSIS — Z17.0 MALIGNANT NEOPLASM OF CENTRAL PORTION OF RIGHT BREAST IN FEMALE, ESTROGEN RECEPTOR POSITIVE (HCC): Primary | ICD-10-CM

## 2023-01-11 DIAGNOSIS — C50.111 MALIGNANT NEOPLASM OF CENTRAL PORTION OF RIGHT BREAST IN FEMALE, ESTROGEN RECEPTOR POSITIVE (HCC): Primary | ICD-10-CM

## 2023-01-11 LAB
ALBUMIN SERPL BCP-MCNC: 3.6 G/DL (ref 3.5–5)
ALP SERPL-CCNC: 49 U/L (ref 46–116)
ALT SERPL W P-5'-P-CCNC: 25 U/L (ref 12–78)
ANION GAP SERPL CALCULATED.3IONS-SCNC: 8 MMOL/L (ref 4–13)
AST SERPL W P-5'-P-CCNC: 23 U/L (ref 5–45)
BASOPHILS # BLD AUTO: 0.06 THOUSANDS/ÂΜL (ref 0–0.1)
BASOPHILS NFR BLD AUTO: 2 % (ref 0–1)
BILIRUB SERPL-MCNC: 0.3 MG/DL (ref 0.2–1)
BUN SERPL-MCNC: 20 MG/DL (ref 5–25)
CALCIUM SERPL-MCNC: 9 MG/DL (ref 8.3–10.1)
CHLORIDE SERPL-SCNC: 102 MMOL/L (ref 96–108)
CO2 SERPL-SCNC: 28 MMOL/L (ref 21–32)
CREAT SERPL-MCNC: 0.72 MG/DL (ref 0.6–1.3)
EOSINOPHIL # BLD AUTO: 0.39 THOUSAND/ÂΜL (ref 0–0.61)
EOSINOPHIL NFR BLD AUTO: 10 % (ref 0–6)
ERYTHROCYTE [DISTWIDTH] IN BLOOD BY AUTOMATED COUNT: 12 % (ref 11.6–15.1)
GFR SERPL CREATININE-BSD FRML MDRD: 90 ML/MIN/1.73SQ M
GLUCOSE SERPL-MCNC: 111 MG/DL (ref 65–140)
HCT VFR BLD AUTO: 36.9 % (ref 34.8–46.1)
HGB BLD-MCNC: 11.7 G/DL (ref 11.5–15.4)
IMM GRANULOCYTES # BLD AUTO: 0.01 THOUSAND/UL (ref 0–0.2)
IMM GRANULOCYTES NFR BLD AUTO: 0 % (ref 0–2)
LYMPHOCYTES # BLD AUTO: 0.62 THOUSANDS/ÂΜL (ref 0.6–4.47)
LYMPHOCYTES NFR BLD AUTO: 16 % (ref 14–44)
MCH RBC QN AUTO: 30.2 PG (ref 26.8–34.3)
MCHC RBC AUTO-ENTMCNC: 31.7 G/DL (ref 31.4–37.4)
MCV RBC AUTO: 95 FL (ref 82–98)
MONOCYTES # BLD AUTO: 0.29 THOUSAND/ÂΜL (ref 0.17–1.22)
MONOCYTES NFR BLD AUTO: 8 % (ref 4–12)
NEUTROPHILS # BLD AUTO: 2.43 THOUSANDS/ÂΜL (ref 1.85–7.62)
NEUTS SEG NFR BLD AUTO: 64 % (ref 43–75)
NRBC BLD AUTO-RTO: 0 /100 WBCS
PLATELET # BLD AUTO: 216 THOUSANDS/UL (ref 149–390)
PMV BLD AUTO: 9.2 FL (ref 8.9–12.7)
POTASSIUM SERPL-SCNC: 4.2 MMOL/L (ref 3.5–5.3)
PROT SERPL-MCNC: 6.7 G/DL (ref 6.4–8.4)
RBC # BLD AUTO: 3.87 MILLION/UL (ref 3.81–5.12)
SODIUM SERPL-SCNC: 138 MMOL/L (ref 135–147)
WBC # BLD AUTO: 3.8 THOUSAND/UL (ref 4.31–10.16)

## 2023-01-11 RX ORDER — SODIUM CHLORIDE 9 MG/ML
20 INJECTION, SOLUTION INTRAVENOUS ONCE
Status: COMPLETED | OUTPATIENT
Start: 2023-01-11 | End: 2023-01-11

## 2023-01-11 RX ADMIN — SODIUM CHLORIDE 361 MG: 0.9 INJECTION, SOLUTION INTRAVENOUS at 13:51

## 2023-01-11 RX ADMIN — SODIUM CHLORIDE 20 ML/HR: 0.9 INJECTION, SOLUTION INTRAVENOUS at 13:08

## 2023-01-11 RX ADMIN — PERTUZUMAB 420 MG: 30 INJECTION, SOLUTION, CONCENTRATE INTRAVENOUS at 13:17

## 2023-01-17 ENCOUNTER — OFFICE VISIT (OUTPATIENT)
Dept: HEMATOLOGY ONCOLOGY | Facility: CLINIC | Age: 63
End: 2023-01-17

## 2023-01-17 ENCOUNTER — TELEPHONE (OUTPATIENT)
Dept: HEMATOLOGY ONCOLOGY | Facility: CLINIC | Age: 63
End: 2023-01-17

## 2023-01-17 VITALS
TEMPERATURE: 98.5 F | HEART RATE: 83 BPM | OXYGEN SATURATION: 98 % | WEIGHT: 132 LBS | HEIGHT: 65 IN | RESPIRATION RATE: 16 BRPM | SYSTOLIC BLOOD PRESSURE: 118 MMHG | DIASTOLIC BLOOD PRESSURE: 66 MMHG | BODY MASS INDEX: 21.99 KG/M2

## 2023-01-17 DIAGNOSIS — Z95.828 PORT-A-CATH IN PLACE: ICD-10-CM

## 2023-01-17 DIAGNOSIS — T45.1X5A CHEMOTHERAPY INDUCED CARDIOMYOPATHY (HCC): Primary | ICD-10-CM

## 2023-01-17 DIAGNOSIS — Z17.0 MALIGNANT NEOPLASM OF CENTRAL PORTION OF RIGHT BREAST IN FEMALE, ESTROGEN RECEPTOR POSITIVE (HCC): ICD-10-CM

## 2023-01-17 DIAGNOSIS — T38.6X5A OSTEOPOROSIS DUE TO AROMATASE INHIBITOR: Primary | ICD-10-CM

## 2023-01-17 DIAGNOSIS — I42.7 CHEMOTHERAPY INDUCED CARDIOMYOPATHY (HCC): Primary | ICD-10-CM

## 2023-01-17 DIAGNOSIS — C50.111 MALIGNANT NEOPLASM OF CENTRAL PORTION OF RIGHT BREAST IN FEMALE, ESTROGEN RECEPTOR POSITIVE (HCC): ICD-10-CM

## 2023-01-17 DIAGNOSIS — M81.8 OSTEOPOROSIS DUE TO AROMATASE INHIBITOR: Primary | ICD-10-CM

## 2023-01-17 RX ORDER — LIDOCAINE AND PRILOCAINE 25; 25 MG/G; MG/G
CREAM TOPICAL AS NEEDED
Qty: 30 G | Refills: 1 | Status: SHIPPED | OUTPATIENT
Start: 2023-01-17

## 2023-01-17 NOTE — PROGRESS NOTES
HPI:  Patient is here with her   In March 2022 patient was diagnosed to have  T2 NX triple positive right breast cancer , 2 5 cm tumor mass with negative axilla on ultrasound and negative distant metastatic disease  Path  report was invasive mammary carcinoma, grade 2, ER 70-80% and FL 70-80% and HER2 3+ positive  Patient was started on neoadjuvant chemotherapy and she received 6 cycles of TCHP chemotherapy  Patient had lumpectomy and sentinel lymph node sampling on 09/30/2022 and there was residual 1 8 cm invasive cancer  Margins were clear  All  8 sentinel lymph nodes were negative for metastatic disease  Patient has received radiation  Patient  is  on  Herceptin plus Perjeta and letrozole and that was started in November 2022  She has osteopenia and she is taking calcium and vitamin D  We discussed Prolia and she signed informed consent for that after receiving verbal and printed information on Prolia and they will be started  Negative genetic testing for significant variant  No history of cancers in her family  Patient is less nervous and anxious now than before  Has some tiredness  We also discussed seeing a rheumatologist or an endocrinologist for osteopenia but she would like to get going on Prolia  She does not have problem with her teeth  ROS:   Reviewed 12 systems: See symptoms in HPI  Presently no other neurological, cardiac, pulmonary, GI and  symptoms other than listed in HPI  Other symptoms are in HPI  No symptoms like fever, chills, bleeding, bone pains, skin rash, weight loss, night sweats, arthritic symptoms,   weakness, numbness, claudication and gait problem  No frequent infections  Not unusually sensitive to heat or cold  No swelling of the ankles  No swollen glands  Patient is less anxious  Physical Exam:  Vitals: Weight 59 8  Temperature 98 5  Pulse 83  Respirations 16    Blood pressure 118/66 and oxygen saturation 98%     Patient is alert and oriented  Patient not in distress  Vital signs are stable  There is no icterus , no oral thrush, no palpable neck mass,   lung fields clear to percussion and auscultation , regular heart rate,   soft and non tender abdomen, no palpable abdominal mass, no ascites, no edema of ankles, no calf tenderness, no focal neurological deficit, no skin rash, no palpable lymphadenopathy in the neck and axillary areas,  no clubbing  Patient is anxious  Performance status 1  No lymphedema      Historical Information   Past Medical History:   Diagnosis Date   • Breast cancer ([facility] [address] )    • Diarrhea     occasional   • Port-A-Cath in place     R side chest     Past Surgical History:   Procedure Laterality Date   • BREAST BIOPSY Right 03/29/2022    IDC   • BREAST LUMPECTOMY Right 9/30/2022    Procedure: ML BREAST;  Surgeon: Nicole Mendez MD;  [facility];  Service: Surgical Oncology   • CATARACT EXTRACTION Left 2019    with lens implant   • IR PORT PLACEMENT  4/20/2022   • LYMPH NODE BIOPSY Right 9/30/2022    Procedure: SLN BX;  Surgeon: Nicole Mendez MD;  [facility]: [facility];  Service: Surgical Oncology   • MANDIBLE SURGERY      jaw surgery for crooked jaw 1993, 1998   • ROTATOR CUFF REPAIR Right 2020   • US BREAST ML  NEEDLE LOC RIGHT Right 3/29/2022   • US GUIDED BREAST BIOPSY RIGHT COMPLETE Right 3/29/2022     Social History   Social History     Substance and Sexual Activity   Alcohol Use Not Currently     Social History     Substance and Sexual Activity   Drug Use Never     Social History     Tobacco Use   Smoking Status Never   Smokeless Tobacco Never     Family History:   Family History   Problem Relation Age of Onset   • No Known Problems Mother    • Prostate cancer Father    • No Known Problems Sister    • No Known Problems Daughter    • No Known Problems Daughter    • Lung cancer Maternal Aunt    • No Known Problems Maternal Aunt    • No Known Problems Paternal Aunt    • No Known Problems Paternal Aunt • No Known Problems Maternal Grandmother    • No Known Problems Maternal Grandfather    • Breast cancer Paternal Grandmother [de-identified]   • No Known Problems Paternal Grandfather    • Cancer Cousin         worked in radiology, 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 ovarian cancer   • Stomach cancer Other          Current Outpatient Medications:   •  albuterol (ACCUNEB) 1 25 MG/3ML nebulizer solution, Take 1 25 mg by nebulization every 6 (six) hours as needed for wheezing, Disp: , Rfl:   •  albuterol (PROVENTIL HFA,VENTOLIN HFA) 90 mcg/act inhaler, , Disp: , Rfl:   •  letrozole (FEMARA) 2 5 mg tablet, Take 1 tablet (2 5 mg total) by mouth daily, Disp: 30 tablet, Rfl: 5  •  lidocaine-prilocaine (EMLA) cream, Apply topically as needed for mild pain, Disp: 30 g, Rfl: 1  •  sodium chloride (OCEAN) 0 65 % nasal spray, Saline Nasal Mist, Disp: , Rfl:   •  traMADol (ULTRAM) 50 mg tablet, Take 1 tablet (50 mg total) by mouth every 8 (eight) hours as needed for moderate pain, Disp: 9 tablet, Rfl: 0    Allergies   Allergen Reactions   • Molds & Smuts Hives     Sore throat   • Nuts - Food Allergy Hives     BRAZILIAN NUTS                 Lab Results: I have reviewed all pertinent labs    LABS:    Results for orders placed or performed during the hospital encounter of 01/11/23   CBC and differential   Result Value Ref Range    WBC 3 80 (L) 4 31 - 10 16 Thousand/uL    RBC 3 87 3 81 - 5 12 Million/uL    Hemoglobin 11 7 11 5 - 15 4 g/dL    Hematocrit 36 9 34 8 - 46 1 %    MCV 95 82 - 98 fL    MCH 30 2 26 8 - 34 3 pg    MCHC 31 7 31 4 - 37 4 g/dL    RDW 12 0 11 6 - 15 1 %    MPV 9 2 8 9 - 12 7 fL    Platelets 710 927 - 155 Thousands/uL    nRBC 0 /100 WBCs    Neutrophils Relative 64 43 - 75 %    Immat GRANS % 0 0 - 2 %    Lymphocytes Relative 16 14 - 44 %    Monocytes Relative 8 4 - 12 %    Eosinophils Relative 10 (H) 0 - 6 %    Basophils Relative 2 (H) 0 - 1 %    Neutrophils Absolute 2 43 1 85 - 7 62 Thousands/µL    Immature Grans Absolute 0 01 0 00 - 0 20 Thousand/uL    Lymphocytes Absolute 0 62 0 60 - 4 47 Thousands/µL    Monocytes Absolute 0 29 0 17 - 1 22 Thousand/µL    Eosinophils Absolute 0 39 0 00 - 0 61 Thousand/µL    Basophils Absolute 0 06 0 00 - 0 10 Thousands/µL   Comprehensive metabolic panel   Result Value Ref Range    Sodium 138 135 - 147 mmol/L    Potassium 4 2 3 5 - 5 3 mmol/L    Chloride 102 96 - 108 mmol/L    CO2 28 21 - 32 mmol/L    ANION GAP 8 4 - 13 mmol/L    BUN 20 5 - 25 mg/dL    Creatinine 0 72 0 60 - 1 30 mg/dL    Glucose 111 65 - 140 mg/dL    Calcium 9 0 8 3 - 10 1 mg/dL    AST 23 5 - 45 U/L    ALT 25 12 - 78 U/L    Alkaline Phosphatase 49 46 - 116 U/L    Total Protein 6 7 6 4 - 8 4 g/dL    Albumin 3 6 3 5 - 5 0 g/dL    Total Bilirubin 0 30 0 20 - 1 00 mg/dL    eGFR 90 ml/min/1 73sq m         Imaging Studies: I have personally reviewed pertinent reports  IMPRESSION:     1  No scintigraphic evidence of osseous metastasis           Workstation performed: [id number]          Imaging    NM bone scan whole body (Order: [id number]) - 4/22/2022  OSSEOUS STRUCTURES:  No acute fracture or destructive osseous lesion  Old right rib fractures  Degenerative changes, most significant at the right shoulder  L4-L5 anterolisthesis  Lumbar levocurvature  Left transitional lumbosacral segment      IMPRESSION:     Right breast lesion consistent with known malignancy      Left adrenal gland thickening, attention on follow-up      3 mm left upper lobe pulmonary nodule      Additional findings as above      The study was marked in EPIC for significant notification         Workstation performed: [id number]          Imaging    CT chest abdomen pelvis w contrast (Order: [id number]) - 4/21/2022      Reviewed above test results and discussed with patient and her     Assessment and Plan:  See diagnoses, orders instructions below  Patient is here with her     In March 2022 patient was diagnosed to have  T2 NX triple positive right breast cancer , 2 5 cm tumor mass with negative axilla on ultrasound and negative distant metastatic disease  Path  report was invasive mammary carcinoma, grade 2, ER 70-80% and MA 70-80% and HER2 3+ positive  Patient was started on neoadjuvant chemotherapy and she received 6 cycles of TCHP chemotherapy  Patient had lumpectomy and sentinel lymph node sampling on 09/30/2022 and there was residual 1 8 cm invasive cancer  Margins were clear  All  8 sentinel lymph nodes were negative for metastatic disease  Patient has received radiation  Patient  is  on  Herceptin plus Perjeta and letrozole and that was started in November 2022  She has osteopenia and she is taking calcium and vitamin D  We discussed Prolia and she signed informed consent for that after receiving verbal and printed information on Prolia and they will be started  Negative genetic testing for significant variant  No history of cancers in her family  Patient is less nervous and anxious now than before  Has some tiredness  We also discussed seeing a rheumatologist or an endocrinologist for osteopenia but she would like to get going on Prolia  She does not have problem with her teeth  Physical examination and test results are as recorded and discussed in detail  Plan is as above, Herceptin plus Perjeta for a total of 1 year but letrozole to be continued  Also calcium with vitamin D and Prolia  She will continue to have echocardiogram every 3 months  Discussed all this with patient in detail  Questions answered  Patient  has Port-A-Cath and she gets that flushed  Discussed importance of self-breast examination, eating healthy foods, staying active and health screening test   Patient is capable of self-care  Discussed precautions against coronavirus and flu  Goal of therapy will be cure if possible   All discussed in very much detail  Questions answered    1  Malignant neoplasm of central portion of right breast in female, estrogen receptor positive (HCC)    - lidocaine-prilocaine (EMLA) cream; Apply topically as needed for mild pain  Dispense: 30 g; Refill: 1    2  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[address] )      3  Port-A-Cath in place    - lidocaine-prilocaine (EMLA) cream; Apply topically as needed for mild pain  Dispense: 30 g; Refill: 1    No change in therapy except adding Prolia and patient will sign informed consent for Prolia  Follow-up in 2 months  Addendum: Informed consent signed for Prolia   Patient will continue to follow with her primary physician and other consultants  Patient voiced understanding and agrees     This note has been generated by voice recognition software  Therefore there maybe spelling, grammar, and/or syntax errors  Please contact if questions arise  Counseling / Coordination of Care    Devi Vargas   Provided counseling and support

## 2023-01-17 NOTE — PATIENT INSTRUCTIONS
No change in therapy except adding Prolia and patient will sign informed consent for Prolia  Follow-up in 2 months

## 2023-01-17 NOTE — PROGRESS NOTES
Esteban patient information medication provided and reviewed with patient  Consent signed for prolia  No additional questions at this time

## 2023-01-25 ENCOUNTER — HOSPITAL ENCOUNTER (OUTPATIENT)
Dept: NON INVASIVE DIAGNOSTICS | Facility: CLINIC | Age: 63
Discharge: HOME | End: 2023-01-25

## 2023-01-25 VITALS
BODY MASS INDEX: 21.99 KG/M2 | WEIGHT: 132 LBS | DIASTOLIC BLOOD PRESSURE: 66 MMHG | HEIGHT: 65 IN | HEART RATE: 75 BPM | SYSTOLIC BLOOD PRESSURE: 118 MMHG

## 2023-01-25 DIAGNOSIS — T45.1X5A CHEMOTHERAPY INDUCED CARDIOMYOPATHY (HCC): ICD-10-CM

## 2023-01-25 DIAGNOSIS — I42.7 CHEMOTHERAPY INDUCED CARDIOMYOPATHY (HCC): ICD-10-CM

## 2023-01-25 LAB
AORTIC ROOT: 3.2 CM
APICAL FOUR CHAMBER EJECTION FRACTION: 58 %
E WAVE DECELERATION TIME: 173 MS
FRACTIONAL SHORTENING: 38 % (ref 28–44)
GLOBAL LONGITUIDAL STRAIN: -20 %
INTERVENTRICULAR SEPTUM IN DIASTOLE (PARASTERNAL SHORT AXIS VIEW): 0.8 CM
INTERVENTRICULAR SEPTUM: 0.8 CM (ref 0.6–1.1)
LAAS-AP2: 22.1 CM2
LAAS-AP4: 10.6 CM2
LEFT ATRIUM AREA SYSTOLE SINGLE PLANE A4C: 11.6 CM2
LEFT ATRIUM SIZE: 2.5 CM
LEFT INTERNAL DIMENSION IN SYSTOLE: 2.6 CM (ref 2.1–4)
LEFT VENTRICLE DIASTOLIC VOLUME (MOD BIPLANE): 127 ML
LEFT VENTRICLE SYSTOLIC VOLUME (MOD BIPLANE): 59 ML
LEFT VENTRICULAR INTERNAL DIMENSION IN DIASTOLE: 4.2 CM (ref 3.5–6)
LEFT VENTRICULAR POSTERIOR WALL IN END DIASTOLE: 0.8 CM
LEFT VENTRICULAR STROKE VOLUME: 52 ML
LV EF: 53 %
LVSV (TEICH): 52 ML
MV E'TISSUE VEL-SEP: 12 CM/S
MV PEAK A VEL: 0.62 M/S
MV PEAK E VEL: 59 CM/S
MV STENOSIS PRESSURE HALF TIME: 50 MS
MV VALVE AREA P 1/2 METHOD: 4.4 CM2
RA PRESSURE ESTIMATED: 5 MMHG
RIGHT ATRIUM AREA SYSTOLE A4C: 16.9 CM2
RIGHT VENTRICLE ID DIMENSION: 3.2 CM
RV PSP: 27 MMHG
SL CV LEFT ATRIUM LENGTH A2C: 4.8 CM
SL CV LV EF: 60
SL CV PED ECHO LEFT VENTRICLE DIASTOLIC VOLUME (MOD BIPLANE) 2D: 78 ML
SL CV PED ECHO LEFT VENTRICLE SYSTOLIC VOLUME (MOD BIPLANE) 2D: 25 ML
TR MAX PG: 22 MMHG
TR PEAK VELOCITY: 2.3 M/S
TRICUSPID VALVE PEAK REGURGITATION VELOCITY: 2.34 M/S

## 2023-01-26 RX ORDER — SODIUM CHLORIDE 9 MG/ML
20 INJECTION, SOLUTION INTRAVENOUS ONCE
Status: CANCELLED | OUTPATIENT
Start: 2023-02-01

## 2023-02-01 ENCOUNTER — HOSPITAL ENCOUNTER (OUTPATIENT)
Dept: INFUSION CENTER | Facility: CLINIC | Age: 63
Discharge: HOME | End: 2023-02-01

## 2023-02-01 VITALS
HEIGHT: 65 IN | TEMPERATURE: 99.3 F | RESPIRATION RATE: 16 BRPM | SYSTOLIC BLOOD PRESSURE: 129 MMHG | DIASTOLIC BLOOD PRESSURE: 84 MMHG | WEIGHT: 134.48 LBS | HEART RATE: 82 BPM | BODY MASS INDEX: 22.41 KG/M2

## 2023-02-01 DIAGNOSIS — C50.111 MALIGNANT NEOPLASM OF CENTRAL PORTION OF RIGHT BREAST IN FEMALE, ESTROGEN RECEPTOR POSITIVE (HCC): Primary | ICD-10-CM

## 2023-02-01 DIAGNOSIS — T38.6X5A OSTEOPOROSIS DUE TO AROMATASE INHIBITOR: ICD-10-CM

## 2023-02-01 DIAGNOSIS — M81.8 OSTEOPOROSIS DUE TO AROMATASE INHIBITOR: ICD-10-CM

## 2023-02-01 DIAGNOSIS — Z17.0 MALIGNANT NEOPLASM OF CENTRAL PORTION OF RIGHT BREAST IN FEMALE, ESTROGEN RECEPTOR POSITIVE (HCC): Primary | ICD-10-CM

## 2023-02-01 RX ORDER — SODIUM CHLORIDE 9 MG/ML
20 INJECTION, SOLUTION INTRAVENOUS ONCE
Status: COMPLETED | OUTPATIENT
Start: 2023-02-01 | End: 2023-02-01

## 2023-02-01 RX ADMIN — DENOSUMAB 60 MG: 60 INJECTION SUBCUTANEOUS at 12:46

## 2023-02-01 RX ADMIN — SODIUM CHLORIDE 20 ML/HR: 0.9 INJECTION, SOLUTION INTRAVENOUS at 12:39

## 2023-02-01 RX ADMIN — PERTUZUMAB 420 MG: 30 INJECTION, SOLUTION, CONCENTRATE INTRAVENOUS at 12:56

## 2023-02-01 RX ADMIN — SODIUM CHLORIDE 361 MG: 0.9 INJECTION, SOLUTION INTRAVENOUS at 13:30

## 2023-02-01 NOTE — PROGRESS NOTES
Patient arrived to unit without complaint  Patient tolerated chemotherapy without incident  Parameters reviewed for Prolia SC and patient able to receive today  Patient denies any recent invasive dental work or jaw pain  Patient states that she may have invasive dental work planned for a couple months in the future and will discuss her Prolia with her dentist on the appropriate time to have that completed  Patient tolerated Prolia SC to left arm without incident  AVS declined and patient aware of next appointment  Patient left in stable condition

## 2023-02-09 ENCOUNTER — TELEPHONE (OUTPATIENT)
Dept: HEMATOLOGY ONCOLOGY | Facility: CLINIC | Age: 63
End: 2023-02-09

## 2023-02-09 DIAGNOSIS — T45.1X5A CHEMOTHERAPY INDUCED CARDIOMYOPATHY: Primary | ICD-10-CM

## 2023-02-09 DIAGNOSIS — I42.7 CHEMOTHERAPY INDUCED CARDIOMYOPATHY: Primary | ICD-10-CM

## 2023-02-09 NOTE — TELEPHONE ENCOUNTER
Patient called in stating that Dr Peterson Patel wanted her to have a ECHO every 3 months she is due for one and needs a order put in so I can schedule it for her

## 2023-02-10 DIAGNOSIS — C50.111 MALIGNANT NEOPLASM OF CENTRAL PORTION OF RIGHT BREAST IN FEMALE, ESTROGEN RECEPTOR POSITIVE (HCC): ICD-10-CM

## 2023-02-10 DIAGNOSIS — I42.7 CHEMOTHERAPY INDUCED CARDIOMYOPATHY (HCC): Primary | ICD-10-CM

## 2023-02-10 DIAGNOSIS — Z17.0 MALIGNANT NEOPLASM OF CENTRAL PORTION OF RIGHT BREAST IN FEMALE, ESTROGEN RECEPTOR POSITIVE (HCC): ICD-10-CM

## 2023-02-10 DIAGNOSIS — T45.1X5A CHEMOTHERAPY INDUCED CARDIOMYOPATHY (HCC): Primary | ICD-10-CM

## 2023-02-15 RX ORDER — SODIUM CHLORIDE 9 MG/ML
20 INJECTION, SOLUTION INTRAVENOUS ONCE
Status: CANCELLED | OUTPATIENT
Start: 2023-02-22

## 2023-02-22 ENCOUNTER — HOSPITAL ENCOUNTER (OUTPATIENT)
Dept: INFUSION CENTER | Facility: CLINIC | Age: 63
Discharge: HOME | End: 2023-02-22

## 2023-02-22 VITALS
BODY MASS INDEX: 21.98 KG/M2 | WEIGHT: 132.06 LBS | SYSTOLIC BLOOD PRESSURE: 130 MMHG | HEART RATE: 91 BPM | RESPIRATION RATE: 18 BRPM | TEMPERATURE: 98.4 F | DIASTOLIC BLOOD PRESSURE: 73 MMHG

## 2023-02-22 DIAGNOSIS — Z17.0 MALIGNANT NEOPLASM OF CENTRAL PORTION OF RIGHT BREAST IN FEMALE, ESTROGEN RECEPTOR POSITIVE (HCC): Primary | ICD-10-CM

## 2023-02-22 DIAGNOSIS — C50.111 MALIGNANT NEOPLASM OF CENTRAL PORTION OF RIGHT BREAST IN FEMALE, ESTROGEN RECEPTOR POSITIVE (HCC): Primary | ICD-10-CM

## 2023-02-22 LAB
ALBUMIN SERPL BCP-MCNC: 4.1 G/DL (ref 3.5–5)
ALP SERPL-CCNC: 37 U/L (ref 34–104)
ALT SERPL W P-5'-P-CCNC: 13 U/L (ref 7–52)
ANION GAP SERPL CALCULATED.3IONS-SCNC: 7 MMOL/L (ref 4–13)
AST SERPL W P-5'-P-CCNC: 16 U/L (ref 13–39)
BASOPHILS # BLD AUTO: 0.04 THOUSANDS/ÂΜL (ref 0–0.1)
BASOPHILS NFR BLD AUTO: 1 % (ref 0–1)
BILIRUB SERPL-MCNC: 0.52 MG/DL (ref 0.2–1)
BUN SERPL-MCNC: 15 MG/DL (ref 5–25)
CALCIUM SERPL-MCNC: 9.2 MG/DL (ref 8.4–10.2)
CHLORIDE SERPL-SCNC: 105 MMOL/L (ref 96–108)
CO2 SERPL-SCNC: 25 MMOL/L (ref 21–32)
CREAT SERPL-MCNC: 0.7 MG/DL (ref 0.6–1.3)
EOSINOPHIL # BLD AUTO: 0.14 THOUSAND/ÂΜL (ref 0–0.61)
EOSINOPHIL NFR BLD AUTO: 2 % (ref 0–6)
ERYTHROCYTE [DISTWIDTH] IN BLOOD BY AUTOMATED COUNT: 12.1 % (ref 11.6–15.1)
GFR SERPL CREATININE-BSD FRML MDRD: 93 ML/MIN/1.73SQ M
GLUCOSE SERPL-MCNC: 100 MG/DL (ref 65–140)
HCT VFR BLD AUTO: 34.7 % (ref 34.8–46.1)
HGB BLD-MCNC: 11.1 G/DL (ref 11.5–15.4)
IMM GRANULOCYTES # BLD AUTO: 0.02 THOUSAND/UL (ref 0–0.2)
IMM GRANULOCYTES NFR BLD AUTO: 0 % (ref 0–2)
LYMPHOCYTES # BLD AUTO: 0.94 THOUSANDS/ÂΜL (ref 0.6–4.47)
LYMPHOCYTES NFR BLD AUTO: 14 % (ref 14–44)
MCH RBC QN AUTO: 30.5 PG (ref 26.8–34.3)
MCHC RBC AUTO-ENTMCNC: 32 G/DL (ref 31.4–37.4)
MCV RBC AUTO: 95 FL (ref 82–98)
MONOCYTES # BLD AUTO: 0.65 THOUSAND/ÂΜL (ref 0.17–1.22)
MONOCYTES NFR BLD AUTO: 9 % (ref 4–12)
NEUTROPHILS # BLD AUTO: 5.13 THOUSANDS/ÂΜL (ref 1.85–7.62)
NEUTS SEG NFR BLD AUTO: 74 % (ref 43–75)
NRBC BLD AUTO-RTO: 0 /100 WBCS
PLATELET # BLD AUTO: 287 THOUSANDS/UL (ref 149–390)
PMV BLD AUTO: 9 FL (ref 8.9–12.7)
POTASSIUM SERPL-SCNC: 4 MMOL/L (ref 3.5–5.3)
PROT SERPL-MCNC: 6.7 G/DL (ref 6.4–8.4)
RBC # BLD AUTO: 3.64 MILLION/UL (ref 3.81–5.12)
SODIUM SERPL-SCNC: 137 MMOL/L (ref 135–147)
WBC # BLD AUTO: 6.92 THOUSAND/UL (ref 4.31–10.16)

## 2023-02-22 RX ORDER — SODIUM CHLORIDE 9 MG/ML
20 INJECTION, SOLUTION INTRAVENOUS ONCE
Status: COMPLETED | OUTPATIENT
Start: 2023-02-22 | End: 2023-02-22

## 2023-02-22 RX ADMIN — SODIUM CHLORIDE 361 MG: 0.9 INJECTION, SOLUTION INTRAVENOUS at 13:32

## 2023-02-22 RX ADMIN — SODIUM CHLORIDE 20 ML/HR: 0.9 INJECTION, SOLUTION INTRAVENOUS at 12:41

## 2023-02-22 RX ADMIN — PERTUZUMAB 420 MG: 30 INJECTION, SOLUTION, CONCENTRATE INTRAVENOUS at 12:57

## 2023-02-22 NOTE — PROGRESS NOTES
Patient tolerated Perjecta/Trazimera infusions without incident  Pt is aware of all future appointments   Declined AVS

## 2023-03-08 RX ORDER — SODIUM CHLORIDE 9 MG/ML
20 INJECTION, SOLUTION INTRAVENOUS ONCE
Status: CANCELLED | OUTPATIENT
Start: 2023-03-15

## 2023-03-09 ENCOUNTER — TELEPHONE (OUTPATIENT)
Dept: SURGICAL ONCOLOGY | Facility: CLINIC | Age: 63
End: 2023-03-09

## 2023-03-09 NOTE — TELEPHONE ENCOUNTER
Received a phone message from Maikol  Returned her call  She questioned if DR Suman Downing would do breast reduction surgery or breast surgery for assymetry  Discussed these types of surgery are done by a plastic surgeon  She asked for contact information for a plastic surgeon  Provided her with contact information for [address] [place] [address] and Reconstructive Office

## 2023-03-15 ENCOUNTER — HOSPITAL ENCOUNTER (OUTPATIENT)
Dept: INFUSION CENTER | Facility: CLINIC | Age: 63
Discharge: HOME | End: 2023-03-15

## 2023-03-15 VITALS
SYSTOLIC BLOOD PRESSURE: 127 MMHG | DIASTOLIC BLOOD PRESSURE: 78 MMHG | TEMPERATURE: 98 F | WEIGHT: 133.38 LBS | HEART RATE: 90 BPM | RESPIRATION RATE: 18 BRPM | HEIGHT: 65 IN | BODY MASS INDEX: 22.22 KG/M2

## 2023-03-15 DIAGNOSIS — Z17.0 MALIGNANT NEOPLASM OF CENTRAL PORTION OF RIGHT BREAST IN FEMALE, ESTROGEN RECEPTOR POSITIVE (HCC): Primary | ICD-10-CM

## 2023-03-15 DIAGNOSIS — C50.111 MALIGNANT NEOPLASM OF CENTRAL PORTION OF RIGHT BREAST IN FEMALE, ESTROGEN RECEPTOR POSITIVE (HCC): Primary | ICD-10-CM

## 2023-03-15 RX ORDER — LOPERAMIDE HYDROCHLORIDE 2 MG/1
2 CAPSULE ORAL 4 TIMES DAILY PRN
COMMUNITY

## 2023-03-15 RX ORDER — SODIUM CHLORIDE 9 MG/ML
20 INJECTION, SOLUTION INTRAVENOUS ONCE
Status: COMPLETED | OUTPATIENT
Start: 2023-03-15 | End: 2023-03-15

## 2023-03-15 RX ADMIN — SODIUM CHLORIDE 20 ML/HR: 0.9 INJECTION, SOLUTION INTRAVENOUS at 12:23

## 2023-03-15 RX ADMIN — PERTUZUMAB 420 MG: 30 INJECTION, SOLUTION, CONCENTRATE INTRAVENOUS at 12:59

## 2023-03-15 RX ADMIN — SODIUM CHLORIDE 361 MG: 0.9 INJECTION, SOLUTION INTRAVENOUS at 13:36

## 2023-03-15 NOTE — PROGRESS NOTES
Patient without complaint,tolerated Perjecta/Trazimera infusions without incident  Pt is aware of all future appointments   Declined AVS

## 2023-03-28 ENCOUNTER — OFFICE VISIT (OUTPATIENT)
Dept: HEMATOLOGY ONCOLOGY | Facility: CLINIC | Age: 63
End: 2023-03-28

## 2023-03-28 VITALS
RESPIRATION RATE: 18 BRPM | TEMPERATURE: 98.3 F | HEART RATE: 93 BPM | SYSTOLIC BLOOD PRESSURE: 126 MMHG | WEIGHT: 133.38 LBS | OXYGEN SATURATION: 97 % | BODY MASS INDEX: 22.22 KG/M2 | DIASTOLIC BLOOD PRESSURE: 78 MMHG | HEIGHT: 65 IN

## 2023-03-28 DIAGNOSIS — T45.1X5A CHEMOTHERAPY INDUCED CARDIOMYOPATHY (HCC): ICD-10-CM

## 2023-03-28 DIAGNOSIS — C50.111 MALIGNANT NEOPLASM OF CENTRAL PORTION OF RIGHT BREAST IN FEMALE, ESTROGEN RECEPTOR POSITIVE (HCC): Primary | ICD-10-CM

## 2023-03-28 DIAGNOSIS — I42.7 CHEMOTHERAPY INDUCED CARDIOMYOPATHY (HCC): ICD-10-CM

## 2023-03-28 DIAGNOSIS — Z95.828 PORT-A-CATH IN PLACE: ICD-10-CM

## 2023-03-28 DIAGNOSIS — Z17.0 MALIGNANT NEOPLASM OF CENTRAL PORTION OF RIGHT BREAST IN FEMALE, ESTROGEN RECEPTOR POSITIVE (HCC): Primary | ICD-10-CM

## 2023-03-28 RX ORDER — LETROZOLE 2.5 MG/1
2.5 TABLET, FILM COATED ORAL DAILY
Qty: 30 TABLET | Refills: 5 | Status: SHIPPED | OUTPATIENT
Start: 2023-03-28

## 2023-03-28 RX ORDER — LIDOCAINE AND PRILOCAINE 25; 25 MG/G; MG/G
CREAM TOPICAL
Qty: 30 G | Refills: 1 | Status: SHIPPED | OUTPATIENT
Start: 2023-03-28

## 2023-03-28 NOTE — PATIENT INSTRUCTIONS
Patient has standing orders for blood work  Reordered letrozole and Emla cream   She will have CT scan and referral to GI for colonoscopy after completion of intravenous systemic therapy

## 2023-03-28 NOTE — PROGRESS NOTES
HPI:  Patient is here with her   Follow-up visit for  T2 NX triple positive right breast cancer, diagnosed in March 2022 and tumor mass was 2 5 cm  with negative axilla on ultrasound and negative distant metastatic disease  Path  report was invasive mammary carcinoma, grade 2, ER 70-80% and CT 70-80% and HER2 3+ positive  Patient received neoadjuvant chemo immunotherapy and she received 6 cycles of TCHP and after that she had lumpectomy and sentinel lymph node sampling and that was on 09/30/2022 and there was residual 1 8 cm invasive cancer  Margins were clear  All  8 sentinel lymph nodes were negative for metastatic disease  Patient received radiation  Patient  is  on Herceptin plus Perjeta and that will be completed in April 2023  Letrozole was started in November 2022 and she will take that for a total of 10 years  She has osteopenia and she is taking calcium and vitamin D and is also on Prolia  Negative genetic testing for significant variant  No history of cancers in her family  Patient is less nervous and anxious now than before  Has less tiredness  ROS:   Reviewed 12 systems: See symptoms in HPI  Presently no other neurological, cardiac, pulmonary, GI and  symptoms other than listed in HPI  Other symptoms are in HPI  No  fever, chills, bleeding, bone pains, skin rash, weight loss, night sweats, arthritic symptoms,   weakness, numbness, claudication and gait problem  No frequent infections  Not unusually sensitive to heat or cold  No swelling of the ankles  No swollen glands  Patient is less anxious        Physical Exam:  Vitals from encounters over the past 365 days      3/28/23 3/15/23 2/22/23   Blood Pressure 126/78 127/78 130/73   Pulse 93 90 91   Respirations 18 18 18   Temperature 98 3 °F (36 8 °C) 98 °F (36 7 °C) 98 4 °F (36 9 °C)   Temp Source Tympanic Tympanic Tympanic   SpO2 97 % -- --   Weight - Scale 60 5 kg (133 lb 6 1 oz) 60 5 kg (133 lb 6 1 oz) 59 9 kg (132 lb 0 9 oz) "  Height 5' 5\" (1 651 m) 5' 5\" (1 651 m) --          Alert and oriented and not in distress  Vital signs are above  No icterus , there is no oral thrush, no palpable neck mass,   lung fields are clear to percussion and auscultation ,  heart rate is regular, abdomen is   soft and non tender , no palpable abdominal mass, no ascites, no edema of ankles, no calf tenderness, no focal neurological deficit, no skin rash, no palpable lymphadenopathy in the neck and axillary areas,  no clubbing  Patient is anxious  Performance status 1  No lymphedema      Historical Information   Past Medical History:   Diagnosis Date   • Breast cancer ([address] )    • Diarrhea     occasional   • Port-A-Cath in place     R side chest     Past Surgical History:   Procedure Laterality Date   • BREAST BIOPSY Right 03/29/2022    IDC   • BREAST LUMPECTOMY Right 9/30/2022    Procedure: ML BREAST;  Surgeon: Fred Humphrey MD;  [facility];  Service: Surgical Oncology   • CATARACT EXTRACTION Left 2019    with lens implant   • IR PORT PLACEMENT  4/20/2022   • LYMPH NODE BIOPSY Right 9/30/2022    Procedure: SLN BX;  Surgeon: Fred Humphrey MD;  [facility];  Service: Surgical Oncology   • MANDIBLE SURGERY      jaw surgery for crooked jaw 1993, 1998   • ROTATOR CUFF REPAIR Right 2020   • US BREAST ML  NEEDLE LOC RIGHT Right 3/29/2022   • US GUIDED BREAST BIOPSY RIGHT COMPLETE Right 3/29/2022     Social History   Social History     Substance and Sexual Activity   Alcohol Use Not Currently     Social History     Substance and Sexual Activity   Drug Use Never     Social History     Tobacco Use   Smoking Status Never   Smokeless Tobacco Never     Family History:   Family History   Problem Relation Age of Onset   • No Known Problems Mother    • Prostate cancer Father    • No Known Problems Sister    • No Known Problems Daughter    • No Known Problems Daughter    • Lung cancer Maternal Aunt    • No Known Problems Maternal Aunt    • " No Known Problems Paternal Aunt    • No Known Problems Paternal Aunt    • No Known Problems Maternal Grandmother    • No Known Problems Maternal Grandfather    • Breast cancer Paternal Grandmother [de-identified]   • No Known Problems Paternal Grandfather    • Cancer Cousin         worked in radiology, 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 ovarian cancer   • Stomach cancer Other          Current Outpatient Medications:   •  albuterol (ACCUNEB) 1 25 MG/3ML nebulizer solution, Take 1 25 mg by nebulization every 6 (six) hours as needed for wheezing, Disp: , Rfl:   •  albuterol (PROVENTIL HFA,VENTOLIN HFA) 90 mcg/act inhaler, , Disp: , Rfl:   •  letrozole (FEMARA) 2 5 mg tablet, Take 1 tablet (2 5 mg total) by mouth daily, Disp: 30 tablet, Rfl: 5  •  lidocaine-prilocaine (EMLA) cream, Apply topically as needed for mild pain, Disp: 30 g, Rfl: 1  •  loperamide (IMODIUM) 2 mg capsule, Take 2 mg by mouth 4 (four) times a day as needed for diarrhea, Disp: , Rfl:   •  sodium chloride (OCEAN) 0 65 % nasal spray, Saline Nasal Mist, Disp: , Rfl:   •  traMADol (ULTRAM) 50 mg tablet, Take 1 tablet (50 mg total) by mouth every 8 (eight) hours as needed for moderate pain, Disp: 9 tablet, Rfl: 0    Allergies   Allergen Reactions   • Molds & Smuts Hives     Sore throat   • Nuts - Food Allergy Hives     BRAZILIAN NUTS                 Lab Results: I have reviewed all pertinent labs    LABS:    Results for orders placed or performed during the hospital encounter of 02/22/23   CBC and differential   Result Value Ref Range    WBC 6 92 4 31 - 10 16 Thousand/uL    RBC 3 64 (L) 3 81 - 5 12 Million/uL    Hemoglobin 11 1 (L) 11 5 - 15 4 g/dL    Hematocrit 34 7 (L) 34 8 - 46 1 %    MCV 95 82 - 98 fL    MCH 30 5 26 8 - 34 3 pg    MCHC 32 0 31 4 - 37 4 g/dL    RDW 12 1 11 6 - 15 1 %    MPV 9 0 8 9 - 12 7 fL    Platelets 817 887 - 375 Thousands/uL    nRBC 0 /100 WBCs    Neutrophils Relative 74 43 - 75 %    Immat GRANS % 0 0 - 2 %    Lymphocytes Relative 14 14 - 44 % Monocytes Relative 9 4 - 12 %    Eosinophils Relative 2 0 - 6 %    Basophils Relative 1 0 - 1 %    Neutrophils Absolute 5 13 1 85 - 7 62 Thousands/µL    Immature Grans Absolute 0 02 0 00 - 0 20 Thousand/uL    Lymphocytes Absolute 0 94 0 60 - 4 47 Thousands/µL    Monocytes Absolute 0 65 0 17 - 1 22 Thousand/µL    Eosinophils Absolute 0 14 0 00 - 0 61 Thousand/µL    Basophils Absolute 0 04 0 00 - 0 10 Thousands/µL   Comprehensive metabolic panel   Result Value Ref Range    Sodium 137 135 - 147 mmol/L    Potassium 4 0 3 5 - 5 3 mmol/L    Chloride 105 96 - 108 mmol/L    CO2 25 21 - 32 mmol/L    ANION GAP 7 4 - 13 mmol/L    BUN 15 5 - 25 mg/dL    Creatinine 0 70 0 60 - 1 30 mg/dL    Glucose 100 65 - 140 mg/dL    Calcium 9 2 8 4 - 10 2 mg/dL    AST 16 13 - 39 U/L    ALT 13 7 - 52 U/L    Alkaline Phosphatase 37 34 - 104 U/L    Total Protein 6 7 6 4 - 8 4 g/dL    Albumin 4 1 3 5 - 5 0 g/dL    Total Bilirubin 0 52 0 20 - 1 00 mg/dL    eGFR 93 ml/min/1 73sq m         Imaging Studies: I have personally reviewed pertinent reports  IMPRESSION:     1  No scintigraphic evidence of osseous metastasis           Workstation performed: [id number]          Imaging    NM bone scan whole body (Order: [id number]) - 4/22/2022  OSSEOUS STRUCTURES:  No acute fracture or destructive osseous lesion  Old right rib fractures  Degenerative changes, most significant at the right shoulder  L4-L5 anterolisthesis  Lumbar levocurvature  Left transitional lumbosacral segment      IMPRESSION:     Right breast lesion consistent with known malignancy      Left adrenal gland thickening, attention on follow-up      3 mm left upper lobe pulmonary nodule      Additional findings as above      The study was marked in EPIC for significant notification         Workstation performed: [id number]          Imaging    CT chest abdomen pelvis w contrast (Order: [id number]) - 4/21/2022            Reviewed above test results and discussed with patient and her     Assessment and Plan:  See diagnoses, orders instructions below  Patient is here with her   Follow-up visit for  T2 NX triple positive right breast cancer, diagnosed in March 2022 and tumor mass was 2 5 cm  with negative axilla on ultrasound and negative distant metastatic disease  Path  report was invasive mammary carcinoma, grade 2, ER 70-80% and ME 70-80% and HER2 3+ positive  Patient received neoadjuvant chemo immunotherapy and she received 6 cycles of TCHP and after that she had lumpectomy and sentinel lymph node sampling and that was on 09/30/2022 and there was residual 1 8 cm invasive cancer  Margins were clear  All  8 sentinel lymph nodes were negative for metastatic disease  Patient received radiation  Patient  is  on Herceptin plus Perjeta and that will be completed in April 2023  Letrozole was started in November 2022 and she will take that for a total of 10 years  She has osteopenia and she is taking calcium and vitamin D and is also on Prolia  Negative genetic testing for significant variant  No history of cancers in her family  Patient is less nervous and anxious now than before  Has less tiredness  Physical examination and test results are as recorded and discussed in detail  Plan is as above, Herceptin plus Perjeta for a total of 1 year and that will be finished in April 2023  Letrozole will be continued for a total of 10 years  She will stay on  calcium with vitamin D and Prolia  She will continue to have echocardiogram for now  Discussed all this with patient in detail  Questions answered  Patient  has Port-A-Cath and she gets that flushed  Discussed importance of self-breast examination, eating healthy foods, staying active and health screening test   Patient is capable of self-care  Discussed precautions against coronavirus and flu and other infections  Goal of therapy will be cure if possible   All discussed in very much detail    Questions answered  1  Malignant neoplasm of central portion of right breast in female, estrogen receptor positive ([facility] )    - letrozole (FEMARA) 2 5 mg tablet; Take 1 tablet (2 5 mg total) by mouth daily  Dispense: 30 tablet; Refill: 5  - lidocaine-prilocaine (EMLA) cream; Apply topically on the skin over the Port-A-Cath as advised  Dispense: 30 g; Refill: 1    2  Port-A-Cath in place    - lidocaine-prilocaine (EMLA) cream; Apply topically on the skin over the Port-A-Cath as advised  Dispense: 30 g; Refill: 1    3  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[facility] )    Patient has standing orders for blood work  Reordered letrozole and Emla cream   She will have CT scan and referral to GI for colonoscopy after completion of intravenous systemic therapy  Patient will continue to follow with her primary physician and other consultants  Patient voiced understanding and agrees     This note has been generated by voice recognition software  Therefore there maybe spelling, grammar, and other errors  Please contact if questions arise  Counseling / Coordination of Care    Endy Turcios   Provided counseling and support

## 2023-03-29 RX ORDER — SODIUM CHLORIDE 9 MG/ML
20 INJECTION, SOLUTION INTRAVENOUS ONCE
Status: CANCELLED | OUTPATIENT
Start: 2023-04-05

## 2023-03-30 ENCOUNTER — HOSPITAL ENCOUNTER (OUTPATIENT)
Dept: MAMMOGRAPHY | Facility: CLINIC | Age: 63
Discharge: HOME | End: 2023-03-30

## 2023-03-30 VITALS — HEIGHT: 65 IN | BODY MASS INDEX: 22.04 KG/M2 | WEIGHT: 132.28 LBS

## 2023-03-30 DIAGNOSIS — C50.111 MALIGNANT NEOPLASM OF CENTRAL PORTION OF RIGHT BREAST IN FEMALE, ESTROGEN RECEPTOR POSITIVE (HCC): ICD-10-CM

## 2023-03-30 DIAGNOSIS — Z17.0 MALIGNANT NEOPLASM OF CENTRAL PORTION OF RIGHT BREAST IN FEMALE, ESTROGEN RECEPTOR POSITIVE (HCC): ICD-10-CM

## 2023-04-05 ENCOUNTER — HOSPITAL ENCOUNTER (OUTPATIENT)
Dept: INFUSION CENTER | Facility: CLINIC | Age: 63
Discharge: HOME | End: 2023-04-05

## 2023-04-05 VITALS
TEMPERATURE: 98.5 F | HEART RATE: 91 BPM | SYSTOLIC BLOOD PRESSURE: 117 MMHG | DIASTOLIC BLOOD PRESSURE: 74 MMHG | WEIGHT: 128.75 LBS | BODY MASS INDEX: 21.45 KG/M2 | HEIGHT: 65 IN | RESPIRATION RATE: 18 BRPM

## 2023-04-05 DIAGNOSIS — Z17.0 MALIGNANT NEOPLASM OF CENTRAL PORTION OF RIGHT BREAST IN FEMALE, ESTROGEN RECEPTOR POSITIVE (HCC): Primary | ICD-10-CM

## 2023-04-05 DIAGNOSIS — C50.111 MALIGNANT NEOPLASM OF CENTRAL PORTION OF RIGHT BREAST IN FEMALE, ESTROGEN RECEPTOR POSITIVE (HCC): Primary | ICD-10-CM

## 2023-04-05 LAB
ALBUMIN SERPL BCP-MCNC: 4.3 G/DL (ref 3.5–5)
ALP SERPL-CCNC: 36 U/L (ref 34–104)
ALT SERPL W P-5'-P-CCNC: 14 U/L (ref 7–52)
ANION GAP SERPL CALCULATED.3IONS-SCNC: 6 MMOL/L (ref 4–13)
AST SERPL W P-5'-P-CCNC: 18 U/L (ref 13–39)
BASOPHILS # BLD AUTO: 0.06 THOUSANDS/ÂΜL (ref 0–0.1)
BASOPHILS NFR BLD AUTO: 2 % (ref 0–1)
BILIRUB SERPL-MCNC: 0.55 MG/DL (ref 0.2–1)
BUN SERPL-MCNC: 19 MG/DL (ref 5–25)
CALCIUM SERPL-MCNC: 9.4 MG/DL (ref 8.4–10.2)
CHLORIDE SERPL-SCNC: 105 MMOL/L (ref 96–108)
CO2 SERPL-SCNC: 27 MMOL/L (ref 21–32)
CREAT SERPL-MCNC: 0.74 MG/DL (ref 0.6–1.3)
EOSINOPHIL # BLD AUTO: 0.3 THOUSAND/ÂΜL (ref 0–0.61)
EOSINOPHIL NFR BLD AUTO: 8 % (ref 0–6)
ERYTHROCYTE [DISTWIDTH] IN BLOOD BY AUTOMATED COUNT: 12.6 % (ref 11.6–15.1)
GFR SERPL CREATININE-BSD FRML MDRD: 87 ML/MIN/1.73SQ M
GLUCOSE SERPL-MCNC: 88 MG/DL (ref 65–140)
HCT VFR BLD AUTO: 36.9 % (ref 34.8–46.1)
HGB BLD-MCNC: 11.9 G/DL (ref 11.5–15.4)
IMM GRANULOCYTES # BLD AUTO: 0 THOUSAND/UL (ref 0–0.2)
IMM GRANULOCYTES NFR BLD AUTO: 0 % (ref 0–2)
LYMPHOCYTES # BLD AUTO: 0.81 THOUSANDS/ÂΜL (ref 0.6–4.47)
LYMPHOCYTES NFR BLD AUTO: 22 % (ref 14–44)
MCH RBC QN AUTO: 30.8 PG (ref 26.8–34.3)
MCHC RBC AUTO-ENTMCNC: 32.2 G/DL (ref 31.4–37.4)
MCV RBC AUTO: 96 FL (ref 82–98)
MONOCYTES # BLD AUTO: 0.31 THOUSAND/ÂΜL (ref 0.17–1.22)
MONOCYTES NFR BLD AUTO: 8 % (ref 4–12)
NEUTROPHILS # BLD AUTO: 2.28 THOUSANDS/ÂΜL (ref 1.85–7.62)
NEUTS SEG NFR BLD AUTO: 60 % (ref 43–75)
NRBC BLD AUTO-RTO: 0 /100 WBCS
PLATELET # BLD AUTO: 234 THOUSANDS/UL (ref 149–390)
PMV BLD AUTO: 9.5 FL (ref 8.9–12.7)
POTASSIUM SERPL-SCNC: 4.2 MMOL/L (ref 3.5–5.3)
PROT SERPL-MCNC: 6.6 G/DL (ref 6.4–8.4)
RBC # BLD AUTO: 3.86 MILLION/UL (ref 3.81–5.12)
SODIUM SERPL-SCNC: 138 MMOL/L (ref 135–147)
WBC # BLD AUTO: 3.76 THOUSAND/UL (ref 4.31–10.16)

## 2023-04-05 RX ORDER — SODIUM CHLORIDE 9 MG/ML
20 INJECTION, SOLUTION INTRAVENOUS ONCE
Status: COMPLETED | OUTPATIENT
Start: 2023-04-05 | End: 2023-04-05

## 2023-04-05 RX ADMIN — SODIUM CHLORIDE 20 ML/HR: 0.9 INJECTION, SOLUTION INTRAVENOUS at 12:54

## 2023-04-05 RX ADMIN — PERTUZUMAB 420 MG: 30 INJECTION, SOLUTION, CONCENTRATE INTRAVENOUS at 13:20

## 2023-04-05 RX ADMIN — SODIUM CHLORIDE 361 MG: 0.9 INJECTION, SOLUTION INTRAVENOUS at 13:55

## 2023-04-05 NOTE — PROGRESS NOTES
Labs collected via port  Pt tolerated treatment without incident  Pt declined AVS, aware of future appts

## 2023-04-20 ENCOUNTER — CONSULT (OUTPATIENT)
Dept: PLASTIC SURGERY | Facility: CLINIC | Age: 63
End: 2023-04-20
Payer: COMMERCIAL

## 2023-04-20 VITALS
TEMPERATURE: 97.8 F | BODY MASS INDEX: 20.74 KG/M2 | HEART RATE: 85 BPM | DIASTOLIC BLOOD PRESSURE: 79 MMHG | HEIGHT: 66 IN | WEIGHT: 129.06 LBS | SYSTOLIC BLOOD PRESSURE: 128 MMHG
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
## 2023-04-20 DIAGNOSIS — Z92.3 HISTORY OF RADIATION THERAPY: ICD-10-CM

## 2023-04-20 DIAGNOSIS — Z85.3 HISTORY OF RIGHT BREAST CANCER: ICD-10-CM

## 2023-04-20 DIAGNOSIS — N64.89 BREAST ASYMMETRY IN FEMALE: Primary | ICD-10-CM

## 2023-04-20 DIAGNOSIS — L59.8 RADIATION FIBROSIS OF SOFT TISSUE FROM THERAPEUTIC PROCEDURE: ICD-10-CM

## 2023-04-20 PROCEDURE — 99205 OFFICE O/P NEW HI 60 MIN: CPT | Performed by: PLASTIC SURGERY

## 2023-04-20 RX ORDER — EPINEPHRINE 0.3 MG/.3ML
INJECTION SUBCUTANEOUS
COMMUNITY
Start: 2023-02-02

## 2023-04-20 RX ORDER — DENOSUMAB 60 MG/ML
INJECTION SUBCUTANEOUS
COMMUNITY

## 2023-04-20 NOTE — PROGRESS NOTES
Plastic Surgery H&P  [address] [place] [address], [address]      Assessment/Plan:    Assessment:  1  Acquired breast asymmetry  2  Hx of right breast cancer  3  S/P lumpectomy and RT  4  Radiation fibrosis of right breast     Plan:  I had a lengthy discussion with the patient regarding breast reconstruction options  I recommended potential fat grafting to the right breast with symmetrizing procedure on the left if desired  All details of the surgical procedures were discussed at length with the patient including operative and recovery time, as well as, all potential risks, benefits and alternatives  The patient and her family noted their understanding  They had opportunity for questions  All were answered  She will think about her options and let us know if she decides to proceed  She is welcome to call or return with any additional questions or concerns  Subjective      Desi Moses is a 59 y o  female who presents for evaluation of acquired breast asymmetry  Patient has history of right breast cancer  She underwent right lumpectomy and adjuvant radiation therapy  She notes radiation changes to the right breast and asymmetry with the left  She is interested in knowing her reconstructive options at this point  She presents today for evaluation  Past Medical History:   Diagnosis Date   • Anemia March 28,2022    Due to infusion therapy?    • Asthma 2018 2018 to Jan 2021, i was diagnosed with asthma, jan 2021, i did nit have asthma according to the breathing test the asthma doctor performed, i get allergiy shot every 2 weeks for 2 different types of molds   • Breast cancer ([facility] [address] )    • Cancer ([facility] [address] ) 3/29/2022    Diagnosed with breast cancer   • Diarrhea     occasional   • Port-A-Cath in place     R side chest       Past Surgical History:   Procedure Laterality Date   • BREAST BIOPSY Right 03/29/2022    [facility]   • BREAST LUMPECTOMY Right 09/30/2022 Procedure: ML BREAST;  Surgeon: Levi Muñiz MD;  [facility];  Service: Surgical Oncology   • CATARACT EXTRACTION Left 2019    with lens implant   • IR PORT PLACEMENT  04/20/2022   • LYMPH NODE BIOPSY Right 09/30/2022    Procedure: SLN BX;  Surgeon: Levi Muñiz MD;  [facility];  Service: Surgical Oncology   • MANDIBLE SURGERY      jaw surgery for crooked jaw 1993, 1998   • ROTATOR CUFF REPAIR Right 2020   • US BREAST ML  NEEDLE LOC RIGHT Right 03/29/2022   • US GUIDED BREAST BIOPSY RIGHT COMPLETE Right 03/29/2022         Current Outpatient Medications:   •  albuterol (ACCUNEB) 1 25 MG/3ML nebulizer solution, Take 1 25 mg by nebulization every 6 (six) hours as needed for wheezing, Disp: , Rfl:   •  albuterol (PROVENTIL HFA,VENTOLIN HFA) 90 mcg/act inhaler, , Disp: , Rfl:   •  CALCIUM PO, Take by mouth, Disp: , Rfl:   •  denosumab (Prolia) 60 mg/mL, as directed Subcutaneous, Disp: , Rfl:   •  EPINEPHrine (EPIPEN) 0 3 mg/0 3 mL SOAJ, , Disp: , Rfl:   •  letrozole (FEMARA) 2 5 mg tablet, Take 1 tablet (2 5 mg total) by mouth daily, Disp: 30 tablet, Rfl: 5  •  lidocaine-prilocaine (EMLA) cream, Apply topically on the skin over the Port-A-Cath as advised, Disp: 30 g, Rfl: 1  •  loperamide (IMODIUM) 2 mg capsule, Take 2 mg by mouth 4 (four) times a day as needed for diarrhea, Disp: , Rfl:   •  sodium chloride (OCEAN) 0 65 % nasal spray, Saline Nasal Mist, Disp: , Rfl:   •  VITAMIN D PO, Take by mouth, Disp: , Rfl:     Allergies   Allergen Reactions   • Molds & Smuts Hives and Anaphylaxis     Sore throat  Sore throat     • Other Other (See Comments), Anaphylaxis and Hives     Mold/gets sore throat   • Nuts - Food Allergy Hives     BRAZILIAN NUTS           Family History   Problem Relation Age of Onset   • Breast cancer Mother    • Prostate cancer Father    • Asthma Father         He has had asthma previously   • Breast cancer Father         My Dad was diagnosed with prostate cancer about 21 "years ago  • No Known Problems Sister    • No Known Problems Daughter    • No Known Problems Daughter    • Lung cancer Maternal Aunt    • No Known Problems Maternal Aunt    • No Known Problems Paternal Aunt    • No Known Problems Paternal Aunt    • No Known Problems Maternal Grandmother    • No Known Problems Maternal Grandfather    • Breast cancer Paternal Grandmother         I was told she had breast cancer in her [de-identified]  • No Known Problems Paternal Grandfather    • Cancer Cousin         worked in radiology, 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 ovarian cancer   • Stomach cancer Other    • Asthma Brother         Has had asthma previously       Social History     Socioeconomic History   • Marital status: /Civil Union     Spouse name: Not on file   • Number of children: Not on file   • Years of education: Not on file   • Highest education level: Not on file   Occupational History   • Not on file   Tobacco Use   • Smoking status: Never     Passive exposure: Never   • Smokeless tobacco: Never   Vaping Use   • Vaping Use: Never used   Substance and Sexual Activity   • Alcohol use: Not Currently   • Drug use: Never   • Sexual activity: Not Currently     Partners: Male     Birth control/protection: Condom Male   Other Topics Concern   • Not on file   Social History Narrative   • Not on file     Social Determinants of Health     Financial Resource Strain: Not on file   Food Insecurity: Not on file   Transportation Needs: Not on file   Physical Activity: Not on file   Stress: Not on file   Social Connections: Not on file   Intimate Partner Violence: Not on file   Housing Stability: Not on file         Review of Systems  Pertinent items are noted in HPI        Objective     /79   Pulse 85   Temp 97 8 °F (36 6 °C)   Ht 5' 5 5\" (1 664 m)   Wt 58 5 kg (129 lb 1 oz)   BMI 21 15 kg/m²     General:  alert and oriented, in no acute distress   Skin:  normal and no rash or abnormalities   Eyes: conjunctivae/corneas " clear  PERRL, EOM's intact  Fundi benign  Mouth: MMM no lesions   Lymph Nodes:  Cervical, supraclavicular, and axillary nodes normal    Lungs:  clear to auscultation bilaterally   Heart:  regular rate and rhythm, S1, S2 normal, no murmur, click, rub or gallop and regular rate and rhythm   Abdomen: soft, non-tender; bowel sounds normal; no masses,  no organomegaly   CVA:  absent   Genitourinary: defer exam   Extremities:  extremities normal, warm and well-perfused; no cyanosis, clubbing, or edema   Neurologic:  Alert and oriented x3  Gait normal  Reflexes and motor strength normal and symmetric  Cranial nerves 2-12 and sensation grossly intact  Psychiatric:  normal mood, behavior, speech, dress, and thought processes      Right breast: Soft, no masses palpable, mild radiation changes with atrophy, scar contraction, and very mild hyperpigmentation  Right breast is  smaller than left breast   Left breast: Soft, no masses palpable, no axillary adenopathy palpable  Left breast is moderately larger than the right breast     A total of 75 mins was spent on the encounter, including reviewing the patient's records, documentation, and time spent in counseling coordination of care which represent greater than 50% of the visit  60 mins was spent in counseling and discussion of surgical procedures/options and answering extensive questions about surgical options

## 2023-04-25 ENCOUNTER — HOSPITAL ENCOUNTER (OUTPATIENT)
Dept: NON INVASIVE DIAGNOSTICS | Facility: CLINIC | Age: 63
Discharge: HOME | End: 2023-04-25

## 2023-04-25 VITALS
BODY MASS INDEX: 20.73 KG/M2 | SYSTOLIC BLOOD PRESSURE: 128 MMHG | HEART RATE: 80 BPM | DIASTOLIC BLOOD PRESSURE: 79 MMHG | HEIGHT: 66 IN | WEIGHT: 129 LBS

## 2023-04-25 DIAGNOSIS — T45.1X5A CHEMOTHERAPY INDUCED CARDIOMYOPATHY (HCC): ICD-10-CM

## 2023-04-25 DIAGNOSIS — C50.111 MALIGNANT NEOPLASM OF CENTRAL PORTION OF RIGHT BREAST IN FEMALE, ESTROGEN RECEPTOR POSITIVE (HCC): ICD-10-CM

## 2023-04-25 DIAGNOSIS — Z17.0 MALIGNANT NEOPLASM OF CENTRAL PORTION OF RIGHT BREAST IN FEMALE, ESTROGEN RECEPTOR POSITIVE (HCC): ICD-10-CM

## 2023-04-25 DIAGNOSIS — I42.7 CHEMOTHERAPY INDUCED CARDIOMYOPATHY (HCC): ICD-10-CM

## 2023-04-25 LAB
AORTIC ROOT: 2.9 CM
APICAL FOUR CHAMBER EJECTION FRACTION: 49 %
ASCENDING AORTA: 3.1 CM
DOP CALC LVOT AREA: 2.83 CM2
DOP CALC LVOT DIAMETER: 1.9 CM
E WAVE DECELERATION TIME: 236 MS
FRACTIONAL SHORTENING: 45 (ref 28–44)
GLOBAL LONGITUIDAL STRAIN: 18 %
INTERVENTRICULAR SEPTUM IN DIASTOLE (PARASTERNAL SHORT AXIS VIEW): 0.8 CM
INTERVENTRICULAR SEPTUM: 0.8 CM (ref 0.6–1.1)
LEFT ATRIUM SIZE: 3 CM
LEFT INTERNAL DIMENSION IN SYSTOLE: 2.4 CM (ref 2.1–4)
LEFT VENTRICLE DIASTOLIC VOLUME (MOD BIPLANE): 110 ML
LEFT VENTRICLE SYSTOLIC VOLUME (MOD BIPLANE): 57 ML
LEFT VENTRICULAR INTERNAL DIMENSION IN DIASTOLE: 4.4 CM (ref 3.5–6)
LEFT VENTRICULAR POSTERIOR WALL IN END DIASTOLE: 0.8 CM
LEFT VENTRICULAR STROKE VOLUME: 68 ML
LV EF: 48 %
LVSV (TEICH): 68 ML
MV PEAK A VEL: 0.52 M/S
MV PEAK E VEL: 52 CM/S
MV STENOSIS PRESSURE HALF TIME: 68 MS
MV VALVE AREA P 1/2 METHOD: 3.24
RIGHT VENTRICLE ID DIMENSION: 3.4 CM
SL CV LV EF: 55
SL CV PED ECHO LEFT VENTRICLE DIASTOLIC VOLUME (MOD BIPLANE) 2D: 89 ML
SL CV PED ECHO LEFT VENTRICLE SYSTOLIC VOLUME (MOD BIPLANE) 2D: 21 ML
TR MAX PG: 20 MMHG
TR PEAK VELOCITY: 2.2 M/S
TRICUSPID VALVE PEAK REGURGITATION VELOCITY: 2.21 M/S

## 2023-04-26 ENCOUNTER — HOSPITAL ENCOUNTER (OUTPATIENT)
Dept: INFUSION CENTER | Facility: CLINIC | Age: 63
Discharge: HOME | End: 2023-04-26

## 2023-04-26 VITALS
DIASTOLIC BLOOD PRESSURE: 71 MMHG | RESPIRATION RATE: 16 BRPM | SYSTOLIC BLOOD PRESSURE: 101 MMHG | WEIGHT: 130.95 LBS | TEMPERATURE: 99.6 F | HEIGHT: 66 IN | HEART RATE: 88 BPM | BODY MASS INDEX: 21.05 KG/M2

## 2023-04-26 DIAGNOSIS — C50.111 MALIGNANT NEOPLASM OF CENTRAL PORTION OF RIGHT BREAST IN FEMALE, ESTROGEN RECEPTOR POSITIVE (HCC): Primary | ICD-10-CM

## 2023-04-26 DIAGNOSIS — M81.8 OSTEOPOROSIS DUE TO AROMATASE INHIBITOR: Primary | ICD-10-CM

## 2023-04-26 DIAGNOSIS — Z17.0 MALIGNANT NEOPLASM OF CENTRAL PORTION OF RIGHT BREAST IN FEMALE, ESTROGEN RECEPTOR POSITIVE (HCC): Primary | ICD-10-CM

## 2023-04-26 DIAGNOSIS — T38.6X5A OSTEOPOROSIS DUE TO AROMATASE INHIBITOR: Primary | ICD-10-CM

## 2023-04-26 RX ORDER — SODIUM CHLORIDE 9 MG/ML
20 INJECTION, SOLUTION INTRAVENOUS ONCE
Status: COMPLETED | OUTPATIENT
Start: 2023-04-26 | End: 2023-04-26

## 2023-04-26 RX ADMIN — SODIUM CHLORIDE 361 MG: 0.9 INJECTION, SOLUTION INTRAVENOUS at 13:41

## 2023-04-26 RX ADMIN — PERTUZUMAB 420 MG: 30 INJECTION, SOLUTION, CONCENTRATE INTRAVENOUS at 13:06

## 2023-04-26 RX ADMIN — SODIUM CHLORIDE 20 ML/HR: 0.9 INJECTION, SOLUTION INTRAVENOUS at 12:49

## 2023-04-26 NOTE — PROGRESS NOTES
Pt tolerated treatment without incident  Appt for central labs scheduled in 3 weeks  Port flush scheduled for 7 weeks after that  Pt provided with AVS, aware of all appts

## 2023-04-27 ENCOUNTER — TELEPHONE (OUTPATIENT)
Dept: HEMATOLOGY ONCOLOGY | Facility: CLINIC | Age: 63
End: 2023-04-27

## 2023-04-27 NOTE — TELEPHONE ENCOUNTER
Patient Call    Who are you speaking with? Patient    If it is not the patient, are they listed on an active communication consent form? N/A   What is the reason for this call? Pt would like to discuss the results of her Echo with Dr Jose Alfredo Scherer   Does this require a call back? Yes   If a call back is required, please list best call back number [phone]   If a call back is required, advise that a message will be forwarded to their care team and someone will return their call as soon as possible  Did you relay this information to the patient?  Yes

## 2023-05-02 ENCOUNTER — PATIENT OUTREACH (OUTPATIENT)
Dept: CASE MANAGEMENT | Facility: OTHER | Age: 63
End: 2023-05-02

## 2023-05-02 NOTE — PROGRESS NOTES
OSW placed outreach TC to pt this afternoon  OSW received her VM  Detailed VM was left requesting a return TC  Contact information was provided

## 2023-05-04 ENCOUNTER — PATIENT OUTREACH (OUTPATIENT)
Dept: CASE MANAGEMENT | Facility: OTHER | Age: 63
End: 2023-05-04

## 2023-05-04 NOTE — PROGRESS NOTES
Biopsychosocial and Barriers Assessment Survivorship     Home/Cell Phone: [phone]  Emergency Contact: [phone]  Marital Status:   Interpretation concerns, speaks another language, preferred language: English  Cultural concerns: none  Ability to read or write: yes    Housin story  Home Setup: 1 step  Lives With: spouse and daughter  Daily Living Activities: independent  Durable Medical Equipment: none  Ambulation: independent    Preferred Pharmacy: [facility]  Medication coverage: yes    Employment: not employed  [place] Status/Location: n/a  Ability to pay bills: yes  POA/LW/AD: yes    Additional Comments:  OSW placed outreach TC to pt this morning  OSW introduced self and role  Pt completed a Dt, where she scored a 5/10  She reports the following concerns: changes in appetite, worry, anger, relationship with her daughter and taking care of others  She states that she is concerned about her daughter, but did not go into detail  Pt states that overall she is doing what she needs to do with her doctors appointments and recommendations  She was appreciative of the outreach

## 2023-05-11 ENCOUNTER — TELEPHONE (OUTPATIENT)
Dept: GASTROENTEROLOGY | Facility: CLINIC | Age: 63
End: 2023-05-11

## 2023-05-11 NOTE — TELEPHONE ENCOUNTER
05/11/23  Screened by: Cassius Sullivan    Referring Provider     Pre- Screening: There is no height or weight on file to calculate BMI  Has patient been referred for a routine screening Colonoscopy? yes  Is the patient between 39-70 years old? yes      Previous Colonoscopy no   If yes:    Date:     Facility:     Reason:       SCHEDULING STAFF: If the patient is between 45yrs-49yrs, please advise patient to confirm benefits/coverage with their insurance company for a routine screening colonoscopy, some insurance carriers will only cover at [address] or older  If the patient is over 66years old, please schedule an office visit  Does the patient want to see a Gastroenterologist prior to their procedure OR are they having any GI symptoms? yes, diarrhea    Has the patient been hospitalized or had abdominal surgery in the past 6 months? no    Does the patient use supplemental oxygen? no    Does the patient take Coumadin, Lovenox, Plavix, Elliquis, Xarelto, or other blood thinning medication? no    Has the patient had a stroke, cardiac event, or stent placed in the past year? no    SCHEDULING STAFF: If patient answers NO to above questions, then schedule procedure  If patient answers YES to above questions, then schedule office appointment  If patient is between 45yrs - 49yrs, please advise patient that we will have to confirm benefits & coverage with their insurance company for a routine screening colonoscopy        PT FAILED OA

## 2023-05-11 NOTE — TELEPHONE ENCOUNTER
Patients GI provider:  Not established yet    Number to return call: [phone]    Reason for call: Pt failed oa and strictly wants to schedule ov w/ Dr Vic Arizmendi which was referred by her oncology doctor  Dr Vic Arizmendi has no availability  I have placed her on wait list but pt wants to try and get an appt as soon as possible      Scheduled procedure/appointment date if applicable: N/A

## 2023-05-16 ENCOUNTER — TELEPHONE (OUTPATIENT)
Dept: HEMATOLOGY ONCOLOGY | Facility: CLINIC | Age: 63
End: 2023-05-16

## 2023-05-16 NOTE — TELEPHONE ENCOUNTER
Patient Call    Who are you speaking with? Patient    If it is not the patient, are they listed on an active communication consent form? N/A   What is the reason for this call? Patient would like assistance in having her colonoscopy scheduled  Patient requests that if the call cannot be returned until tomorrow, please do not call until after 11AM   Does this require a call back? Yes   If a call back is required, please list best call back number [phone]   If a call back is required, advise that a message will be forwarded to their care team and someone will return their call as soon as possible  Did you relay this information to the patient?  Yes

## 2023-05-16 NOTE — TELEPHONE ENCOUNTER
Per Dr Juan Carlos Carey review of chart pt is on the wait list for Dr Darian Carey recommends seeing any of the GI doctors    Will call pt tomorrow after 11 am

## 2023-05-17 ENCOUNTER — CONSULT (OUTPATIENT)
Dept: GASTROENTEROLOGY | Facility: MEDICAL CENTER | Age: 63
End: 2023-05-17

## 2023-05-17 ENCOUNTER — TELEPHONE (OUTPATIENT)
Dept: GASTROENTEROLOGY | Facility: MEDICAL CENTER | Age: 63
End: 2023-05-17

## 2023-05-17 ENCOUNTER — HOSPITAL ENCOUNTER (OUTPATIENT)
Dept: INFUSION CENTER | Facility: CLINIC | Age: 63
Discharge: HOME | End: 2023-05-17

## 2023-05-17 VITALS — TEMPERATURE: 97.9 F

## 2023-05-17 VITALS
WEIGHT: 127.8 LBS | SYSTOLIC BLOOD PRESSURE: 138 MMHG | HEART RATE: 81 BPM | DIASTOLIC BLOOD PRESSURE: 82 MMHG | TEMPERATURE: 98.4 F | BODY MASS INDEX: 20.94 KG/M2

## 2023-05-17 DIAGNOSIS — Z17.0 MALIGNANT NEOPLASM OF CENTRAL PORTION OF RIGHT BREAST IN FEMALE, ESTROGEN RECEPTOR POSITIVE (HCC): Primary | ICD-10-CM

## 2023-05-17 DIAGNOSIS — M81.8 OSTEOPOROSIS DUE TO AROMATASE INHIBITOR: ICD-10-CM

## 2023-05-17 DIAGNOSIS — R19.7 DIARRHEA, UNSPECIFIED TYPE: ICD-10-CM

## 2023-05-17 DIAGNOSIS — T38.6X5A OSTEOPOROSIS DUE TO AROMATASE INHIBITOR: ICD-10-CM

## 2023-05-17 DIAGNOSIS — Z95.828 PORT-A-CATH IN PLACE: ICD-10-CM

## 2023-05-17 DIAGNOSIS — C50.111 MALIGNANT NEOPLASM OF CENTRAL PORTION OF RIGHT BREAST IN FEMALE, ESTROGEN RECEPTOR POSITIVE (HCC): Primary | ICD-10-CM

## 2023-05-17 DIAGNOSIS — Z12.11 COLON CANCER SCREENING: Primary | ICD-10-CM

## 2023-05-17 LAB
ALBUMIN SERPL BCP-MCNC: 4.3 G/DL (ref 3.5–5)
ALP SERPL-CCNC: 38 U/L (ref 34–104)
ALT SERPL W P-5'-P-CCNC: 16 U/L (ref 7–52)
ANION GAP SERPL CALCULATED.3IONS-SCNC: 6 MMOL/L (ref 4–13)
AST SERPL W P-5'-P-CCNC: 19 U/L (ref 13–39)
BASOPHILS # BLD AUTO: 0.07 THOUSANDS/ÂΜL (ref 0–0.1)
BASOPHILS NFR BLD AUTO: 2 % (ref 0–1)
BILIRUB SERPL-MCNC: 0.43 MG/DL (ref 0.2–1)
BUN SERPL-MCNC: 20 MG/DL (ref 5–25)
CALCIUM SERPL-MCNC: 9.7 MG/DL (ref 8.4–10.2)
CHLORIDE SERPL-SCNC: 103 MMOL/L (ref 96–108)
CO2 SERPL-SCNC: 29 MMOL/L (ref 21–32)
CREAT SERPL-MCNC: 0.68 MG/DL (ref 0.6–1.3)
EOSINOPHIL # BLD AUTO: 0.49 THOUSAND/ÂΜL (ref 0–0.61)
EOSINOPHIL NFR BLD AUTO: 10 % (ref 0–6)
ERYTHROCYTE [DISTWIDTH] IN BLOOD BY AUTOMATED COUNT: 12.4 % (ref 11.6–15.1)
GFR SERPL CREATININE-BSD FRML MDRD: 94 ML/MIN/1.73SQ M
GLUCOSE SERPL-MCNC: 94 MG/DL (ref 65–140)
HCT VFR BLD AUTO: 38.1 % (ref 34.8–46.1)
HGB BLD-MCNC: 12.3 G/DL (ref 11.5–15.4)
IMM GRANULOCYTES # BLD AUTO: 0.01 THOUSAND/UL (ref 0–0.2)
IMM GRANULOCYTES NFR BLD AUTO: 0 % (ref 0–2)
LYMPHOCYTES # BLD AUTO: 1 THOUSANDS/ÂΜL (ref 0.6–4.47)
LYMPHOCYTES NFR BLD AUTO: 21 % (ref 14–44)
MCH RBC QN AUTO: 30.8 PG (ref 26.8–34.3)
MCHC RBC AUTO-ENTMCNC: 32.3 G/DL (ref 31.4–37.4)
MCV RBC AUTO: 95 FL (ref 82–98)
MONOCYTES # BLD AUTO: 0.34 THOUSAND/ÂΜL (ref 0.17–1.22)
MONOCYTES NFR BLD AUTO: 7 % (ref 4–12)
NEUTROPHILS # BLD AUTO: 2.8 THOUSANDS/ÂΜL (ref 1.85–7.62)
NEUTS SEG NFR BLD AUTO: 60 % (ref 43–75)
NRBC BLD AUTO-RTO: 0 /100 WBCS
PLATELET # BLD AUTO: 244 THOUSANDS/UL (ref 149–390)
PMV BLD AUTO: 9.3 FL (ref 8.9–12.7)
POTASSIUM SERPL-SCNC: 3.9 MMOL/L (ref 3.5–5.3)
PROT SERPL-MCNC: 6.7 G/DL (ref 6.4–8.4)
RBC # BLD AUTO: 4 MILLION/UL (ref 3.81–5.12)
SODIUM SERPL-SCNC: 138 MMOL/L (ref 135–147)
WBC # BLD AUTO: 4.71 THOUSAND/UL (ref 4.31–10.16)

## 2023-05-17 NOTE — PROGRESS NOTES
[facility] - Outpatient Consultation  Aristides Akins 59 y o  female MRN: [id number]  Encounter: [id number]          ASSESSMENT AND PLAN:    63F breast cancer s/p chemo/XRT and resection on letrozole here to discuss colon cancer screening  She reports that she has been doing annual stool testing through her insurance company which has been negative though records are not available today  No prior colonoscopy  Average risk with no red flags  We discussed continued colon cancer screening through colonoscopy versus stool testing and she would like to proceed with colonoscopy at this time  1  Colon cancer screening  - Colonoscopy; Future  -Patient given instructions for both MiraLAX and GoLytely based preps  She will review these and decide which prep she would prefer to try  2  Diarrhea, unspecified type  Suspect that this is related to her recent chemotherapy and microbiome changes but may benefit from random colon biopsies during the procedure to rule out microscopic colitis     ______________________________________________________________________    HPI:    Has always had looser stools  While undergoing chemo, had some diarrhea that required Imodium  Currently, bowels are variable, maybe 30-40%of stools are loose/diarrhea  Has several BM per day  No blood in stool  Weight has been stable  No nausea vomiting, heartburn  No prior colonoscopy  Has been doing annual FIT testing through insurance    Maternal aunt had gastric cancer  No colon cancer in family  REVIEW OF SYSTEMS:    CONSTITUTIONAL: Denies any fever, chills, rigors, and weight loss  HEENT: No earache or tinnitus  Denies hearing loss or visual disturbances  CARDIOVASCULAR: No chest pain or palpitations  RESPIRATORY: Denies any cough, hemoptysis, shortness of breath or dyspnea on exertion  GASTROINTESTINAL: As noted in the History of Present Illness  GENITOURINARY: No problems with urination   Denies any hematuria or dysuria  NEUROLOGIC: No dizziness or vertigo, denies headaches  MUSCULOSKELETAL: Denies any muscle or joint pain  SKIN: Denies skin rashes or itching  ENDOCRINE: Denies excessive thirst  Denies intolerance to heat or cold  PSYCHOSOCIAL: Denies depression or anxiety  Denies any recent memory loss  Historical Information   Past Medical History:   Diagnosis Date   • Anemia March 28,2022    Due to infusion therapy?    • Asthma 2018 2018 to Jan 2021, i was diagnosed with asthma, jan 2021, i did nit have asthma according to the breathing test the asthma doctor performed, i get allergiy shot every 2 weeks for 2 different types of molds   • Breast cancer ([facility] [address] )    • Cancer ([facility] [address] ) 3/29/2022    Diagnosed with breast cancer   • Diarrhea     occasional   • Port-A-Cath in place     R side chest     Past Surgical History:   Procedure Laterality Date   • BREAST BIOPSY Right 03/29/2022    [facility]   • BREAST LUMPECTOMY Right 09/30/2022    Procedure: ML BREAST;  Surgeon: Favian Zee MD;  [facility];  Service: Surgical Oncology   • CATARACT EXTRACTION Left 2019    with lens implant   • IR PORT PLACEMENT  04/20/2022   • LYMPH NODE BIOPSY Right 09/30/2022    Procedure: SLN BX;  Surgeon: Favian Zee MD;  [facility];  Service: Surgical Oncology   • MANDIBLE SURGERY      jaw surgery for crooked jaw 1993, 1998   • ROTATOR CUFF REPAIR Right 2020   • US BREAST ML  NEEDLE LOC RIGHT Right 03/29/2022   • US GUIDED BREAST BIOPSY RIGHT COMPLETE Right 03/29/2022     Social History   Social History     Substance and Sexual Activity   Alcohol Use Not Currently     Social History     Substance and Sexual Activity   Drug Use Never     Social History     Tobacco Use   Smoking Status Never   • Passive exposure: Never   Smokeless Tobacco Never     Family History   Problem Relation Age of Onset   • Breast cancer Mother    • Prostate cancer Father    • Asthma Father         He has had asthma previously • Breast cancer Father         My Dad was diagnosed with prostate cancer about 20 years ago  • No Known Problems Sister    • No Known Problems Daughter    • No Known Problems Daughter    • Lung cancer Maternal Aunt    • No Known Problems Maternal Aunt    • No Known Problems Paternal Aunt    • No Known Problems Paternal Aunt    • No Known Problems Maternal Grandmother    • No Known Problems Maternal Grandfather    • Breast cancer Paternal Grandmother         I was told she had breast cancer in her [de-identified]  • No Known Problems Paternal Grandfather    • Cancer Cousin         worked in radiology, 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 ovarian cancer   • Stomach cancer Other    • Asthma Brother         Has had asthma previously       Meds/Allergies       Current Outpatient Medications:   •  albuterol (ACCUNEB) 1 25 MG/3ML nebulizer solution  •  albuterol (PROVENTIL HFA,VENTOLIN HFA) 90 mcg/act inhaler  •  CALCIUM PO  •  denosumab (Prolia) 60 mg/mL  •  EPINEPHrine (EPIPEN) 0 3 mg/0 3 mL SOAJ  •  letrozole (FEMARA) 2 5 mg tablet  •  lidocaine-prilocaine (EMLA) cream  •  loperamide (IMODIUM) 2 mg capsule  •  sodium chloride (OCEAN) 0 65 % nasal spray  •  VITAMIN D PO    Allergies   Allergen Reactions   • Molds & Smuts Hives and Anaphylaxis     Sore throat  Sore throat     • Other Other (See Comments), Anaphylaxis and Hives     Mold/gets sore throat   • Nuts - Food Allergy Hives     BRAZILIAN NUTS               Objective     Blood pressure 138/82, pulse 81, temperature 98 4 °F (36 9 °C), weight 58 kg (127 lb 12 8 oz)  Body mass index is 20 94 kg/m²  PHYSICAL EXAM:      General Appearance:   Alert, cooperative, no distress  Appears mildly anxious   HEENT:   Normocephalic, atraumatic, anicteric  Neck:  Supple, symmetrical, trachea midline   Lungs:   Clear to auscultation bilaterally; no rales, rhonchi or wheezing; respirations unlabored    Heart[de-identified]   Regular rate and rhythm; no murmur, rub, or gallop     Abdomen:   Soft, non-tender, non-distended; normal bowel sounds; no masses, no organomegaly    Genitalia:   Deferred    Rectal:   Deferred    Extremities:  No cyanosis, clubbing or edema    Pulses:  2+ and symmetric    Skin:  No jaundice, rashes, or lesions    Lymph nodes:  No palpable cervical lymphadenopathy        Lab Results:   No visits with results within 1 Day(s) from this visit  Latest known visit with results is:   Hospital Outpatient Visit on 04/25/2023   Component Date Value   • A4C EF 04/25/2023 49    • LV Diastolic Volume (BP) 96/02/8636 810    • LV Systolic Volume (BP) 08/73/8314 57    • EF 04/25/2023 48    • LVIDd 04/25/2023 4 40    • LVIDS 04/25/2023 2 40    • IVSd 04/25/2023 0 80    • LVPWd 04/25/2023 0 80    • FS 04/25/2023 45    • E wave deceleration time 04/25/2023 236    • MV Peak E Phil 04/25/2023 52    • MV Peak A Phil 04/25/2023 0 52    • RVID d 04/25/2023 3 4    • LA size 04/25/2023 3    • LVOT diameter 04/25/2023 1 9    • MV stenosis pressure 1/2* 04/25/2023 68    • MV valve area p 1/2 meth* 04/25/2023 3 24    • TR Peak Phil 04/25/2023 2 2    • Triscuspid Valve Regurgi* 04/25/2023 20 0    • Ao root 04/25/2023 2 90    • Asc Ao 04/25/2023 3 1    • Tricuspid valve peak reg* 04/25/2023 2 21    • Left ventricular stroke * 04/25/2023 68 00    • IVS 04/25/2023 0 8    • LEFT VENTRICLE SYSTOLIC * 76/41/3271 21    • LV DIASTOLIC VOLUME (MOD* 06/38/4721 89    • LVSV, 2D 04/25/2023 68    • LVOT area 04/25/2023 2 83    • LV EF 04/25/2023 55    • GLS 04/25/2023 18          Radiology Results:   Echo follow up/limited w/ contrast if indicated    Result Date: 4/25/2023  Narrative: •  Left Ventricle: Left ventricular cavity size is normal  Wall thickness is normal  The left ventricular ejection fraction is 55%  Systolic function is normal  Global longitudinal strain is normal at 18%  Wall motion is normal  Diastolic function is normal  •  Mitral Valve: There is trace regurgitation  •  Tricuspid Valve:  There is trace regurgitation  The right ventricular systolic pressure is normal  •  Pulmonic Valve: There is mild to moderate regurgitation

## 2023-05-17 NOTE — TELEPHONE ENCOUNTER
Returned telephone call spoke with Pt  She called Dr Amish Galindo office to schedule an appt and was told he does not have any availability and they would call her back  This was 5/11 and she has not heard back  Explained that Dr Linette Thomas did not say she needed to see Dr Amish Galindo, it could be any physician in the practice  I will call and ask if they could call her for an appt with one in the practice  Telephone call spoke with Fiona Rojas with GI, Dr Amish Galindo does not have any availability until August   She will have the office call the Pt to set up and appt with any of the doctors unless the Pt wants to wait until August   Returned telephone call to Pt to update

## 2023-05-17 NOTE — TELEPHONE ENCOUNTER
FYI Colon Scheduling August  colon recall Dr Ashley Soriano Miralax/Dulcolax or Golytely was given @ [facility]     06/20/2023 was offered

## 2023-05-17 NOTE — PROGRESS NOTES
Pt presents for central labs  No new meds or concerns  Port accessed and labs obtained per protocol with excellent blood return, tolerated well  Future appointments discussed  AVS declined

## 2023-05-17 NOTE — TELEPHONE ENCOUNTER
Dr Cortney Connelly office contacted office for patient in regards to appointment  As per doctors office patient prefers Dr Vishnu Marshall, but stated patient can see any provider  Please contact patient to assist in scheduling

## 2023-05-19 ENCOUNTER — TELEPHONE (OUTPATIENT)
Dept: HEMATOLOGY ONCOLOGY | Facility: CLINIC | Age: 63
End: 2023-05-19

## 2023-05-19 NOTE — TELEPHONE ENCOUNTER
Patient Call    Who are you speaking with? Patient    If it is not the patient, are they listed on an active communication consent form? N/A   What is the reason for this call? Patient has questions regarding scheduling her CT Scan   Does this require a call back? Yes   If a call back is required, please list best call back number [phone]   If a call back is required, advise that a message will be forwarded to their care team and someone will return their call as soon as possible  Did you relay this information to the patient?  Yes

## 2023-05-22 NOTE — TELEPHONE ENCOUNTER
Returned telephone call spoke with Pt  She is requesting a CT scan be ordered per Dr Pravin Mata last office visit and have it scheduled before her fu appt with Dr Madelyn Desai 6/14  She prefers after 1200 CT scan appt

## 2023-05-23 DIAGNOSIS — C50.111 MALIGNANT NEOPLASM OF CENTRAL PORTION OF RIGHT BREAST IN FEMALE, ESTROGEN RECEPTOR POSITIVE: Primary | ICD-10-CM

## 2023-05-23 DIAGNOSIS — C77.3 MALIGNANT NEOPLASM METASTATIC TO LYMPH NODE OF AXILLA (HCC): ICD-10-CM

## 2023-05-23 DIAGNOSIS — Z17.0 MALIGNANT NEOPLASM OF CENTRAL PORTION OF RIGHT BREAST IN FEMALE, ESTROGEN RECEPTOR POSITIVE: Primary | ICD-10-CM

## 2023-05-24 ENCOUNTER — TELEPHONE (OUTPATIENT)
Dept: HEMATOLOGY ONCOLOGY | Facility: CLINIC | Age: 63
End: 2023-05-24

## 2023-05-24 DIAGNOSIS — I42.7 CHEMOTHERAPY INDUCED CARDIOMYOPATHY (HCC): Primary | ICD-10-CM

## 2023-05-24 DIAGNOSIS — T45.1X5A CHEMOTHERAPY INDUCED CARDIOMYOPATHY (HCC): Primary | ICD-10-CM

## 2023-05-24 NOTE — TELEPHONE ENCOUNTER
On 5/23 I called patient and gave her her ct appt she did agree to time and date  She did question about the barium I stated I would reach out the the nurse and call her back to let her know  Called patient back and left a message after talking to Sriram Rodney she said Dr Ellen Yepez she does not have to drink the barium  I also said if she has any questions to call  [phone]

## 2023-05-24 NOTE — TELEPHONE ENCOUNTER
Returned telephone call spoke with Pt  She states she is ok with getting the ct scan with IV and PO contrast, she will  the contrast at the hospital   She also will be getting an Echo, Dr Amrita Cross will place the order  Explained Sandip Dumont will be calling to schedule these for her

## 2023-05-24 NOTE — TELEPHONE ENCOUNTER
Patient Call    Who are you speaking with? Patient    If it is not the patient, are they listed on an active communication consent form? N/A   What is the reason for this call? Patient calling to request Dr Salena Pace order her heart scan and assist with scheduling  She notes that her last heart scan 04/25/23  Does this require a call back? Yes   If a call back is required, please list best call back number [phone]   If a call back is required, advise that a message will be forwarded to their care team and someone will return their call as soon as possible  Did you relay this information to the patient?  Yes

## 2023-05-25 ENCOUNTER — EVALUATION (OUTPATIENT)
Dept: PHYSICAL THERAPY | Facility: REHABILITATION | Age: 63
End: 2023-05-25

## 2023-05-25 DIAGNOSIS — C50.111 MALIGNANT NEOPLASM OF CENTRAL PORTION OF RIGHT BREAST IN FEMALE, ESTROGEN RECEPTOR POSITIVE (HCC): ICD-10-CM

## 2023-05-25 DIAGNOSIS — M25.511 RIGHT SHOULDER PAIN, UNSPECIFIED CHRONICITY: Primary | ICD-10-CM

## 2023-05-25 DIAGNOSIS — Z17.0 MALIGNANT NEOPLASM OF CENTRAL PORTION OF RIGHT BREAST IN FEMALE, ESTROGEN RECEPTOR POSITIVE (HCC): ICD-10-CM

## 2023-05-25 NOTE — PROGRESS NOTES
Lymphedema Evaluation    Today's Date: 2023  Patient name: Clau Mendoza  : 1960  MRN: [id number]  Referring provider: HUSSAIN Link  Dx:  Encounter Diagnosis     ICD-10-CM    1  Right shoulder pain, unspecified chronicity  M25 511       2  Malignant neoplasm of central portion of right breast in female, estrogen receptor positive ([facility] )  C50 111 Ambulatory referral to Physical Therapy    Z17 0                         Assessment  Assessment details: Clau Mendoza is a 59y o  year old female with complaints of R axillary pain and R shoulder weakness  Patient's presentation is consistent with radiation fibrosis of the R breast with thoracic mobility impairments and mild R shoulder weakness  Relevant medical history includes R breast CA s/p neoadjuvant chemo, lumpectomy and LND, adjuvant radiation, targeted therapy and hormone therapy  The listed physical impairments contribute to the following functional limitations: decreased tolerance to overhead reaching, heavy lifting  Hermelinda Hernandez is a good candidate for physical therapy and would benefit from skilled physical therapy to address the above impairments in order to allow the patient to achieve the goals listed and return to prior level of function  Physical therapy plan of care to include spine and soft tissue mobilization, upper quarter progressive strengthening  Understanding of Dx/Px/POC: good   Prognosis: good    Goals  1  Painfree overhead reaching in 8 weeks  2  No limitations to lifting 15 lb with RUE to shoulder height in 8 weeks  3  Indep in progression of resistance exercise in a safe and effective way for HEP      Plan  Frequency: 1x week  Duration in weeks: 8  Plan of Care beginning date: 2023  Plan of Care expiration date: 2023  Treatment plan discussed with: patient and referring physician        Subjective/History:  - Chief Complaint: R axillary pain with overhead reaching; mild limits in R shoulder ROM "deficits; moderate limits in R upper quarter strength and endurance  - HPI: R breast cancer ([facility], [place] 70%, HER2 positive) - dx in March 2022  Negative genetic testing  o Treatment: neoadjuvant chemotherapy with TCHP and targeted therapy; right lumpectomy and sentinel node biopsy (0/8 positive) on 9/30/22 with Dr Jesus Becerril; adjuvant RT 11/11/2022 - 12/9/2022  Currently on Herceptin and Perjecta therapy; letrozole  - Prior Treatments: did stretches occasionally and improved ROM post-op but intensity/frequency was low  - Imaging:   - Relevant PMHx: R rotator cuff surgery 2020    Lymphedema special questions  - Feeling of heaviness, fullness, pressure? no  - Resolves with elevation: n/a  - Change in fit of shoes/clothes/jewelry? no  - History of Cellulitis?  no  - History of S/DVT? No    Systems Review:  - Cardiac dx: No  - On diuretics? no  - Thyroid dysfunction: No  - Diabetes: No  - Kidney disease:No   - Liver disease: No  - Abdominal surgeries: No  - Orthopedic injuries/surgeries: Yes - R RTC, L cataract, mandibular/TMJ surgery 1998 and 2002  - History of Cancer?  Yes    Pain: currently with arm at side 0/10, arm overhead 3/10  Function: indep in ADLs except she avoids \"heavy\" lifting  Working Status: none  Exercise status: none consistently, but has a bicycle and access to a pool  Patient goals: improve strength and pain    Objective  Lymphedema Exam: Upper Extremity  - Chest Wall/Breast Edema location and quality: trace spongy nonpitting edema with mild radiation fibrosis of the R breast  - Upper extremity:  o Right: no evidence of interstitial edema noted fingers to shoulder    - Lymphedema classification (0 latent, 1 reverse, 2 non-rev, 3 elephantiasis): 0    Skin Assessment:  - Stemmer's sign? no  - Fibrosis (0 normal - 4 >severe): 0  - Erythema scale (1 very faint, 2 faint, 3 bright, 4 very bright): 0  - Hemosiderin staining present: no:   - Scar present: yes- R axilla mildly restricted  - Surface anatomy " changes: none    Functional Capacity:  - Gait assessment: WNL  - Transfer status: WNL  - Ability to don/doff clothing/garments: INDEP  - Upper quarter Sensation: Normal  - Upper quarter Range of Motion: Normal  o Shldr OH flex: 180 3/10 pain  o Shldr OH abd: 180 1/10 pain  o Shldr ER: HBH T5  o Shldr IR: HRH T6  - Cervical ROM: flexion 45 deg, extension 60 deg, L rotation 45deg, R rotation 40 deg  - Thoracic ROM: L rot 50 deg, R rot 40 deg  - Axillary Web Syndrome? no  - Upper Quarter Strength: Abnormal, R shoulder flexion 4/5, all others 5/5       Precautions: h/o R breast CA    Date of Service 5/25          Manual Ther           Spine mobs (grade 1-2)  **         Axillary scar mob 5'                     Lymph baseline volumetrics  **                                          Ther Ex           Supine shoulder stretch w DB Butterfly 2x1' HEP          Seated thoracic ext x10 HEP          Shoulder strengthening  **                    Ther Activity & Self Care                                                       Pt Education           [facility]  **         Lymphedema screening

## 2023-05-30 ENCOUNTER — OFFICE VISIT (OUTPATIENT)
Dept: PHYSICAL THERAPY | Facility: REHABILITATION | Age: 63
End: 2023-05-30

## 2023-05-30 DIAGNOSIS — C50.111 MALIGNANT NEOPLASM OF CENTRAL PORTION OF RIGHT BREAST IN FEMALE, ESTROGEN RECEPTOR POSITIVE (HCC): Primary | ICD-10-CM

## 2023-05-30 DIAGNOSIS — Z17.0 MALIGNANT NEOPLASM OF CENTRAL PORTION OF RIGHT BREAST IN FEMALE, ESTROGEN RECEPTOR POSITIVE (HCC): Primary | ICD-10-CM

## 2023-05-30 DIAGNOSIS — M25.511 RIGHT SHOULDER PAIN, UNSPECIFIED CHRONICITY: ICD-10-CM

## 2023-05-30 NOTE — PROGRESS NOTES
Daily Note     Today's date: 2023  Patient name: Kerry Hagen  : 1960  MRN: [id number]  Referring provider: HUSSAIN Neal  Dx:   Encounter Diagnosis     ICD-10-CM    1  Malignant neoplasm of central portion of right breast in female, estrogen receptor positive ([facility] )  C50 111     Z17 0       2  Right shoulder pain, unspecified chronicity  M25 511                      Subjective: 5/10 pain after 3 minutes in the butterfly stretch, otherwise no adverse effects from the stretches  Objective: See treatment diary below    Assessment: Tolerated treatment well  Patient exhibited good technique with therapeutic exercises that reviewed mobility exercises and added strengthening exercises  She demonstrates safe technique and HEP provided for 3x/week with dumbbells  Plan: Continue per plan of care  Precautions: h/o R breast CA  Access Code: [email or web address]  URL: [email or web address]  Date: 2023  Prepared by: Toño Sweet    Exercises  - Elliptical  - 3 x weekly  - Scaption with Dumbbells  - 3 x weekly - 2 sets - 10 reps  - Standing Bent Over Bilateral Shoulder Row with Dumbbells  - 3 x weekly - 2 sets - 10 reps  - Shoulder Overhead Press in Abduction with Dumbbells  - 3 x weekly - 2 sets - 10 reps  - Standing Bent Over Triceps Extension  - 3 x weekly - 2 sets - 10 reps    Date of Service        Manual Ther         Spine mobs (grade 1-2)   **      Axillary scar mob 5'                 Lymph baseline volumetrics   **                                 Ther Ex         Supine shoulder stretch w DB Butterfly 2x1' HEP Butterfly 2x1'       Seated thoracic ext x10 HEP x10 seated  3x10 supine w FR       Shoulder strengthening  UBE 4'/2' L1 5  scaption 3# 2x10  Bent over row 8# 2x10  overhead press 3# 2x10  Bent over triceps ext 3# 2x10                Ther Activity & Self Care                                             Pt Education         [facility]         Lymphedema screening

## 2023-06-05 ENCOUNTER — TELEPHONE (OUTPATIENT)
Dept: SURGICAL ONCOLOGY | Facility: CLINIC | Age: 63
End: 2023-06-05

## 2023-06-05 ENCOUNTER — OFFICE VISIT (OUTPATIENT)
Dept: PHYSICAL THERAPY | Facility: REHABILITATION | Age: 63
End: 2023-06-05
Payer: COMMERCIAL

## 2023-06-05 DIAGNOSIS — M25.511 RIGHT SHOULDER PAIN, UNSPECIFIED CHRONICITY: ICD-10-CM

## 2023-06-05 DIAGNOSIS — C50.111 MALIGNANT NEOPLASM OF CENTRAL PORTION OF RIGHT BREAST IN FEMALE, ESTROGEN RECEPTOR POSITIVE (HCC): Primary | ICD-10-CM

## 2023-06-05 DIAGNOSIS — Z17.0 MALIGNANT NEOPLASM OF CENTRAL PORTION OF RIGHT BREAST IN FEMALE, ESTROGEN RECEPTOR POSITIVE (HCC): Primary | ICD-10-CM

## 2023-06-05 PROCEDURE — 97140 MANUAL THERAPY 1/> REGIONS: CPT | Performed by: PHYSICAL THERAPIST

## 2023-06-05 PROCEDURE — 97110 THERAPEUTIC EXERCISES: CPT | Performed by: PHYSICAL THERAPIST

## 2023-06-05 NOTE — PROGRESS NOTES
"Daily Note     Today's date: 2023  Patient name: Yarelis Archer  : 1960  MRN: [id number]  Referring provider: HUSSAIN Guillaume  Dx:   Encounter Diagnosis     ICD-10-CM    1  Malignant neoplasm of central portion of right breast in female, estrogen receptor positive ([facility] )  C50 111     Z17 0       2  Right shoulder pain, unspecified chronicity  M25 511                      Subjective: Shoulder pain with stretches resolved after 5 continuous days of stretching and strengthening, but she took the weekend \"off\" from HEP and now she feels mildly stiff again  Wondering how long she will have to continue stretching for until it is no longer stiff  Objective: See treatment diary below    Assessment: Tolerated treatment well  Patient exhibited good technique with therapeutic exercises for upper quarter stretching and strengthening  Educated patient that radiation fibrosis/stiffness may persist lifelong, or may resolve within 6-12 months of continuous HEP  Baseline measurements of BUE taken since she is at risk for lymphedema, but reports no signs/symptoms currently  Plan: Continue per plan of care  Precautions: h/o R breast CA  Access Code: [email or web address]  URL: [email or web address]  Date: 2023  Prepared by: Jasmeet Lind    Exercises  - Elliptical  - 3 x weekly  - Scaption with Dumbbells  - 3 x weekly - 2 sets - 10 reps  - Standing Bent Over Bilateral Shoulder Row with Dumbbells  - 3 x weekly - 2 sets - 10 reps  - Shoulder Overhead Press in Abduction with Dumbbells  - 3 x weekly - 2 sets - 10 reps  - Standing Bent Over Triceps Extension  - 3 x weekly - 2 sets - 10 reps    Date of Service       Manual Ther         Axillary scar mob 5'                 Lymph baseline volumetrics   completed                                 Ther Ex         Supine shoulder stretch w DB Butterfly 2x1' HEP Butterfly 2x1' Butterfly 2x1'      Seated thoracic ext x10 HEP x10 seated  3x10 " supine w FR x10 seated  3x10 supine w FR      Shoulder strengthening  UBE 4'/2' L1 5  scaption 3# 2x10  Bent over row 8# 2x10  overhead press 3# 2x10  Bent over triceps ext 3# 2x10 UBE 3'/3' L1 5    scaption 3# 2x10  Bent over row 8# 2x10  overhead press 3# 2x10  Bent over triceps ext 3# 2x10                 Ther Activity & Self Care                                             Pt Education         [facility]         Lymphedema screening   5' education

## 2023-06-05 NOTE — TELEPHONE ENCOUNTER
"Received a phone message from Orlando Crowell requesting information on a GYN physician  Provided her with several names in the [facility] area and discussed the use of \"Find a Doctor\" on [employer]  She was appreciative     "

## 2023-06-06 ENCOUNTER — HOSPITAL ENCOUNTER (OUTPATIENT)
Dept: CT IMAGING | Facility: HOSPITAL | Age: 63
Discharge: HOME | End: 2023-06-06
Payer: COMMERCIAL

## 2023-06-06 DIAGNOSIS — Z17.0 MALIGNANT NEOPLASM OF CENTRAL PORTION OF RIGHT BREAST IN FEMALE, ESTROGEN RECEPTOR POSITIVE (HCC): ICD-10-CM

## 2023-06-06 DIAGNOSIS — C77.3 MALIGNANT NEOPLASM METASTATIC TO LYMPH NODE OF AXILLA (HCC): ICD-10-CM

## 2023-06-06 DIAGNOSIS — C50.111 MALIGNANT NEOPLASM OF CENTRAL PORTION OF RIGHT BREAST IN FEMALE, ESTROGEN RECEPTOR POSITIVE (HCC): ICD-10-CM

## 2023-06-06 PROCEDURE — G1004 CDSM NDSC: HCPCS

## 2023-06-06 PROCEDURE — 74177 CT ABD & PELVIS W/CONTRAST: CPT

## 2023-06-06 PROCEDURE — 71260 CT THORAX DX C+: CPT

## 2023-06-06 RX ADMIN — IOHEXOL 90 ML: 350 INJECTION, SOLUTION INTRAVENOUS at 13:13

## 2023-06-07 ENCOUNTER — TELEPHONE (OUTPATIENT)
Dept: HEMATOLOGY ONCOLOGY | Facility: CLINIC | Age: 63
End: 2023-06-07

## 2023-06-07 NOTE — TELEPHONE ENCOUNTER
Patient Call    Who are you speaking with? Patient    If it is not the patient, are they listed on an active communication consent form? N/A   What is the reason for this call? Patient is requesting to speak to somebody about her having any work done with her right arm  The patient states they put saline in her right arm for her CT and she is worried about what could happen  Does this require a call back? Yes   If a call back is required, please list best call back number [phone]   If a call back is required, advise that a message will be forwarded to their care team and someone will return their call as soon as possible  Did you relay this information to the patient?  Yes

## 2023-06-07 NOTE — TELEPHONE ENCOUNTER
Returned call to patient  She explained that her right arm was used for IV access during her CT scan  Patient states she was told after surgery not to use her right arm  Patient asked if there is anything she needs to do? Explained nothing for her to do at this point  If she would develop any symptoms / lymphedema it can be addressed  She had specific questions about lymphedema and avoiding using her right arm    I suggested she reach out to the surgeons office for more information since they give the recommendation  Patient will call for CT scan results  She verbalized understanding

## 2023-06-09 ENCOUNTER — TELEPHONE (OUTPATIENT)
Dept: SURGICAL ONCOLOGY | Facility: CLINIC | Age: 63
End: 2023-06-09

## 2023-06-09 NOTE — TELEPHONE ENCOUNTER
Received a phone message from Orlando Crowell  Returned her call  She shared she had a CT scan completed and was concerned that the IV and contrast was placed in her operative side arm  She called worried that this may have caused her harm to her arm  She denies any fevers, redness, pain or swelling in her arm  Reviewed s/s of lymphedema and reviewed simple arm care in her Breast Book  She understands and was appreciative of the clarification

## 2023-06-12 ENCOUNTER — OFFICE VISIT (OUTPATIENT)
Dept: PHYSICAL THERAPY | Facility: REHABILITATION | Age: 63
End: 2023-06-12
Payer: COMMERCIAL

## 2023-06-12 DIAGNOSIS — C50.111 MALIGNANT NEOPLASM OF CENTRAL PORTION OF RIGHT BREAST IN FEMALE, ESTROGEN RECEPTOR POSITIVE (HCC): Primary | ICD-10-CM

## 2023-06-12 DIAGNOSIS — M25.511 RIGHT SHOULDER PAIN, UNSPECIFIED CHRONICITY: ICD-10-CM

## 2023-06-12 DIAGNOSIS — Z17.0 MALIGNANT NEOPLASM OF CENTRAL PORTION OF RIGHT BREAST IN FEMALE, ESTROGEN RECEPTOR POSITIVE (HCC): Primary | ICD-10-CM

## 2023-06-12 PROCEDURE — 97530 THERAPEUTIC ACTIVITIES: CPT | Performed by: PHYSICAL THERAPIST

## 2023-06-12 NOTE — PROGRESS NOTES
Daily Note     Today's date: 2023  Patient name: Bob Olvera  : 1960  MRN: [id number]  Referring provider: HUSSAIN Ragland  Dx:   Encounter Diagnosis     ICD-10-CM    1  Malignant neoplasm of central portion of right breast in female, estrogen receptor positive [facility]  C50 111     Z17 0       2  Right shoulder pain, unspecified chronicity  M25 511                      Subjective: Has IV barium injected into her R arm for CT scan last week and is very concerned that she will develop lymphedema  Due to this trauma to the right arm, and life stress, she avoided exercise this week until yesterday  Does not note any adverse changes in R arm after this week's events  Objective: See treatment diary below  No visible changes in R arm surface anatomy  Assessment: Ruben Courtney has a low-moderate risk of developing R arm lymphedema as a result of her ALND for R breast CA  She demonstrates healthy risk reduction behaviors including regular exercise, weight mgmt, skin care and nail care  Therefore, there is a low risk of 1 well-controlled medical injection to the R arm to trigger lymphedema in this at risk arm  Therefore, today's session was spent providing reassurance to patient and reviewing education on lymphatic anatomy, function, lymphedema pathophysiology, risk reduction, and the exercise recommendations  She is receptive and will resume regular exercise to maintain risk reduction  Plan: Continue per plan of care  Precautions: h/o R breast CA  Access Code: [email or web address]  URL: [email or web address]  Date: 2023  Prepared by: Kelsey Orellana    Exercises  - Elliptical  - 3 x weekly  - Scaption with Dumbbells  - 3 x weekly - 2 sets - 10 reps  - Standing Bent Over Bilateral Shoulder Row with Dumbbells  - 3 x weekly - 2 sets - 10 reps  - Shoulder Overhead Press in Abduction with Dumbbells  - 3 x weekly - 2 sets - 10 reps  - Standing Bent Over Triceps Extension  - 3 x weekly - 2 sets - 10 reps    Date of Service 5/25 5/30 6/5 6/12     Manual Ther         Axillary scar mob 5'                 Lymph baseline volumetrics   completed                                 Ther Ex         Supine shoulder stretch w DB Butterfly 2x1' HEP Butterfly 2x1' Butterfly 2x1'      Seated thoracic ext x10 HEP x10 seated  3x10 supine w FR x10 seated  3x10 supine w FR      Shoulder strengthening  UBE 4'/2' L1 5  scaption 3# 2x10  Bent over row 8# 2x10  overhead press 3# 2x10  Bent over triceps ext 3# 2x10 UBE 3'/3' L1 5    scaption 3# 2x10  Bent over row 8# 2x10  overhead press 3# 2x10  Bent over triceps ext 3# 2x10                 Ther Activity & Self Care                                             Pt Education         [facility]    Education session 61'     Lymphedema screening   5' education

## 2023-06-14 ENCOUNTER — TELEPHONE (OUTPATIENT)
Dept: GASTROENTEROLOGY | Facility: MEDICAL CENTER | Age: 63
End: 2023-06-14

## 2023-06-14 ENCOUNTER — OFFICE VISIT (OUTPATIENT)
Dept: HEMATOLOGY ONCOLOGY | Facility: CLINIC | Age: 63
End: 2023-06-14
Payer: COMMERCIAL

## 2023-06-14 VITALS
RESPIRATION RATE: 16 BRPM | DIASTOLIC BLOOD PRESSURE: 68 MMHG | OXYGEN SATURATION: 96 % | BODY MASS INDEX: 20.73 KG/M2 | SYSTOLIC BLOOD PRESSURE: 120 MMHG | HEART RATE: 90 BPM | HEIGHT: 66 IN | WEIGHT: 129 LBS | TEMPERATURE: 98.6 F

## 2023-06-14 DIAGNOSIS — Z95.828 PORT-A-CATH IN PLACE: ICD-10-CM

## 2023-06-14 DIAGNOSIS — Z17.0 CARCINOMA OF RIGHT BREAST IN FEMALE, ESTROGEN RECEPTOR POSITIVE, UNSPECIFIED SITE OF BREAST (HCC): ICD-10-CM

## 2023-06-14 DIAGNOSIS — C50.911 CARCINOMA OF RIGHT BREAST IN FEMALE, ESTROGEN RECEPTOR POSITIVE, UNSPECIFIED SITE OF BREAST (HCC): ICD-10-CM

## 2023-06-14 DIAGNOSIS — T45.1X5A CHEMOTHERAPY INDUCED CARDIOMYOPATHY (HCC): ICD-10-CM

## 2023-06-14 DIAGNOSIS — I42.7 CHEMOTHERAPY INDUCED CARDIOMYOPATHY (HCC): ICD-10-CM

## 2023-06-14 DIAGNOSIS — C50.911 HER2-POSITIVE CARCINOMA OF RIGHT BREAST (HCC): Primary | ICD-10-CM

## 2023-06-14 PROBLEM — Z17.31 HER2-POSITIVE CARCINOMA OF RIGHT BREAST (HCC): Status: ACTIVE | Noted: 2023-06-14

## 2023-06-14 PROCEDURE — 99214 OFFICE O/P EST MOD 30 MIN: CPT | Performed by: INTERNAL MEDICINE

## 2023-06-14 NOTE — PROGRESS NOTES
Medical Oncology Office Progress Note    HPI:      Ms Raymond Rod is a 59 Y F with  triple positive invasive right breast cancer, Grade 2 disease, diagnosed in March 2022 and tumor mass was 2 5 cm  with negative axilla on ultrasound and negative distant metastatic disease  Path  report was invasive mammary carcinoma, grade 2, ER 70-80% and MA 70-80% and HER2 3+ positive  Patient received neoadjuvant systemic therapy with 6 cycles of TCHP, followed by lumpectomy and sentinel lymph node sampling on 09/30/2022  There was residual 1 8 cm invasive cancer  Margins were clear  All  8 sentinel lymph nodes were negative for metastatic disease  Patient received radiation  Patient  is  on Herceptin plus Perjeta and that will be completed in April 2023  Letrozole was started in November 2022, for total 10 year duration  She has osteopenia and she is taking calcium and vitamin D and is also on Prolia  Negative genetic testing for significant variant  No history of cancers in her family  Interval History:     Patient completed her last dose of adjuvant pertuzumab and trastuzumab on 4/26/23  Patient presented to the office for routine follow-up visit today  Denied any acute complaints  CT CAP from 6/6/2023 did not show any lesions suggestive, abdomen, pelvis  Most recent diagnostic mammogram from March 2023 shows benign findings in both breast; follow-up diagnostic mammogram in 1 year was recommended  Review of Systems   Constitutional: Negative for appetite change, chills, fatigue, fever and unexpected weight change  HENT:   Negative for hearing loss, mouth sores, nosebleeds and sore throat  Eyes: Negative for eye problems and icterus  Respiratory: Negative for chest tightness, cough, shortness of breath and wheezing  Cardiovascular: Negative for chest pain and palpitations  Gastrointestinal: Negative for abdominal distention, abdominal pain, blood in stool, constipation, diarrhea, nausea and vomiting  "  Endocrine: Negative for hot flashes  Genitourinary: Negative for difficulty urinating, dyspareunia, dysuria, frequency and hematuria  Musculoskeletal: Negative for back pain, flank pain, gait problem, myalgias and neck pain  Skin: Negative for itching and rash  Neurological: Negative for dizziness, gait problem, headaches, light-headedness and speech difficulty  Hematological: Does not bruise/bleed easily  Psychiatric/Behavioral: Negative for confusion  The patient is nervous/anxious  Vitals:    06/14/23 1434   BP: 120/68   BP Location: Left arm   Patient Position: Sitting   Cuff Size: Adult   Pulse: 90   Resp: 16   Temp: 98 6 °F (37 °C)   TempSrc: Temporal   SpO2: 96%   Weight: 58 5 kg (129 lb)   Height: 5' 5 51\" (1 664 m)         Physical Exam  Constitutional:       General: She is not in acute distress  Appearance: Normal appearance  She is not toxic-appearing  HENT:      Head: Normocephalic and atraumatic  Mouth/Throat:      Mouth: Mucous membranes are moist       Pharynx: No oropharyngeal exudate or posterior oropharyngeal erythema  Eyes:      General: No scleral icterus  Extraocular Movements: Extraocular movements intact  Conjunctiva/sclera: Conjunctivae normal       Pupils: Pupils are equal, round, and reactive to light  Cardiovascular:      Rate and Rhythm: Normal rate and regular rhythm  Heart sounds: No murmur heard  No gallop  Pulmonary:      Effort: No respiratory distress  Breath sounds: Normal breath sounds  No stridor  No wheezing or rhonchi  Abdominal:      General: Bowel sounds are normal  There is no distension  Palpations: Abdomen is soft  There is no mass  Tenderness: There is no abdominal tenderness  Musculoskeletal:         General: No swelling, tenderness, deformity or signs of injury  Cervical back: No rigidity  Right lower leg: No edema  Left lower leg: No edema     Lymphadenopathy:      Cervical: No " cervical adenopathy  Skin:     Capillary Refill: Capillary refill takes less than 2 seconds  Coloration: Skin is not jaundiced or pale  Findings: No bruising or erythema  Neurological:      General: No focal deficit present  Mental Status: She is oriented to person, place, and time  Motor: No weakness  Psychiatric:      Comments: Very anxious           Historical Information   Past Medical History:   Diagnosis Date   • Anemia March 28,2022    Due to infusion therapy?    • Asthma 2018 2018 to Jan 2021, i was diagnosed with asthma, jan 2021, i did nit have asthma according to the breathing test the asthma doctor performed, i get allergiy shot every 2 weeks for 2 different types of molds   • Breast cancer [address] [address] )    • Cancer ([facility] [address] ) 3/29/2022    Diagnosed with breast cancer   • Diarrhea     occasional   • Port-A-Cath in place     R side chest     Past Surgical History:   Procedure Laterality Date   • BREAST BIOPSY Right 03/29/2022    [facility]   • BREAST LUMPECTOMY Right 09/30/2022    Procedure: ML BREAST;  Surgeon: Katharine Graff MD;  Location: [facility];  Service: Surgical Oncology   • CATARACT EXTRACTION Left 2019    with lens implant   • IR PORT PLACEMENT  04/20/2022   • LYMPH NODE BIOPSY Right 09/30/2022    Procedure: SLN BX;  Surgeon: Katharine Graff MD;  Location: [facility];  Service: Surgical Oncology   • MANDIBLE SURGERY      jaw surgery for crooked jaw 1993, 1998   • ROTATOR CUFF REPAIR Right 2020   • US BREAST ML  NEEDLE LOC RIGHT Right 03/29/2022   • US GUIDED BREAST BIOPSY RIGHT COMPLETE Right 03/29/2022     Social History   Social History     Substance and Sexual Activity   Alcohol Use Not Currently     Social History     Substance and Sexual Activity   Drug Use Never     Social History     Tobacco Use   Smoking Status Never   • Passive exposure: Never   Smokeless Tobacco Never     Family History:   Family History   Problem Relation Age of Onset   • Breast cancer Mother    • Prostate cancer Father    • Asthma Father         He has had asthma previously   • Breast cancer Father         My Dad was diagnosed with prostate cancer about 20 years ago  • No Known Problems Sister    • No Known Problems Daughter    • No Known Problems Daughter    • Lung cancer Maternal Aunt    • No Known Problems Maternal Aunt    • No Known Problems Paternal Aunt    • No Known Problems Paternal Aunt    • No Known Problems Maternal Grandmother    • No Known Problems Maternal Grandfather    • Breast cancer Paternal Grandmother         I was told she had breast cancer in her [de-identified]  • No Known Problems Paternal Grandfather    • Cancer Cousin         worked in radiology, 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 ovarian cancer   • Stomach cancer Other    • Asthma Brother         Has had asthma previously         Current Outpatient Medications:   •  albuterol (ACCUNEB) 1 25 MG/3ML nebulizer solution, Take 1 25 mg by nebulization every 6 (six) hours as needed for wheezing, Disp: , Rfl:   •  albuterol (PROVENTIL HFA,VENTOLIN HFA) 90 mcg/act inhaler, , Disp: , Rfl:   •  CALCIUM PO, Take by mouth, Disp: , Rfl:   •  denosumab (Prolia) 60 mg/mL, as directed Subcutaneous, Disp: , Rfl:   •  EPINEPHrine (EPIPEN) 0 3 mg/0 3 mL SOAJ, , Disp: , Rfl:   •  letrozole (FEMARA) 2 5 mg tablet, Take 1 tablet (2 5 mg total) by mouth daily, Disp: 30 tablet, Rfl: 5  •  lidocaine-prilocaine (EMLA) cream, Apply topically on the skin over the Port-A-Cath as advised, Disp: 30 g, Rfl: 1  •  loperamide (IMODIUM) 2 mg capsule, Take 2 mg by mouth 4 (four) times a day as needed for diarrhea, Disp: , Rfl:   •  sodium chloride (OCEAN) 0 65 % nasal spray, Saline Nasal Mist, Disp: , Rfl:   •  VITAMIN D PO, Take by mouth, Disp: , Rfl:     Allergies   Allergen Reactions   • Molds & Smuts Hives and Anaphylaxis     Sore throat  Sore throat     • Other Other (See Comments), Anaphylaxis and Hives     Mold/gets sore throat   • Nuts - Food Allergy Hives Citizen of [place] NUTS                 Lab Results: I have reviewed all pertinent labs  LABS:    Results for orders placed or performed during the hospital encounter of 05/17/23   CBC and differential   Result Value Ref Range    WBC 4 71 4 31 - 10 16 Thousand/uL    RBC 4 00 3 81 - 5 12 Million/uL    Hemoglobin 12 3 11 5 - 15 4 g/dL    Hematocrit 38 1 34 8 - 46 1 %    MCV 95 82 - 98 fL    MCH 30 8 26 8 - 34 3 pg    MCHC 32 3 31 4 - 37 4 g/dL    RDW 12 4 11 6 - 15 1 %    MPV 9 3 8 9 - 12 7 fL    Platelets 447 548 - 358 Thousands/uL    nRBC 0 /100 WBCs    Neutrophils Relative 60 43 - 75 %    Immat GRANS % 0 0 - 2 %    Lymphocytes Relative 21 14 - 44 %    Monocytes Relative 7 4 - 12 %    Eosinophils Relative 10 (H) 0 - 6 %    Basophils Relative 2 (H) 0 - 1 %    Neutrophils Absolute 2 80 1 85 - 7 62 Thousands/µL    Immature Grans Absolute 0 01 0 00 - 0 20 Thousand/uL    Lymphocytes Absolute 1 00 0 60 - 4 47 Thousands/µL    Monocytes Absolute 0 34 0 17 - 1 22 Thousand/µL    Eosinophils Absolute 0 49 0 00 - 0 61 Thousand/µL    Basophils Absolute 0 07 0 00 - 0 10 Thousands/µL   Comprehensive metabolic panel   Result Value Ref Range    Sodium 138 135 - 147 mmol/L    Potassium 3 9 3 5 - 5 3 mmol/L    Chloride 103 96 - 108 mmol/L    CO2 29 21 - 32 mmol/L    ANION GAP 6 4 - 13 mmol/L    BUN 20 5 - 25 mg/dL    Creatinine 0 68 0 60 - 1 30 mg/dL    Glucose 94 65 - 140 mg/dL    Calcium 9 7 8 4 - 10 2 mg/dL    AST 19 13 - 39 U/L    ALT 16 7 - 52 U/L    Alkaline Phosphatase 38 34 - 104 U/L    Total Protein 6 7 6 4 - 8 4 g/dL    Albumin 4 3 3 5 - 5 0 g/dL    Total Bilirubin 0 43 0 20 - 1 00 mg/dL    eGFR 94 ml/min/1 73sq m         Imaging Studies: I have personally reviewed pertinent reports  IMPRESSION:     1  No scintigraphic evidence of osseous metastasis             Workstation performed: [id number]          Imaging    NM bone scan whole body (Order: [id number]) - 4/22/2022  OSSEOUS STRUCTURES:  No acute fracture or destructive osseous lesion  Old right rib fractures  Degenerative changes, most significant at the right shoulder  L4-L5 anterolisthesis  Lumbar levocurvature  Left transitional lumbosacral segment      IMPRESSION:     Right breast lesion consistent with known malignancy      Left adrenal gland thickening, attention on follow-up      3 mm left upper lobe pulmonary nodule      Additional findings as above      The study was marked in EPIC for significant notification         Workstation performed: [id number]          Imaging    CT chest abdomen pelvis w contrast (Order: [id number]) - 4/21/2022      Reviewed above test results and discussed with patient and her     Assessment and Plan:     1  HER2-positive carcinoma of right breast ([facility] [address] )  2  Carcinoma of right breast in female, estrogen receptor positive, unspecified site [facility]      Ms Janiya Gonzalez is a 59 Y F with  triple positive invasive right breast cancer, Grade 2 disease, diagnosed in March 2022; tumor mass was 2 5 cm  with negative axillary lymphadenopathy on ultrasound and negative distant metastatic disease  Path  report was consistent with invasive mammary carcinoma, grade 2, ER 70-80% and VA 70-80% and HER2 3+ positive  Patient received neoadjuvant systemic therapy with 6 cycles of TCHP, followed by lumpectomy and sentinel lymph node sampling on 09/30/2022  There was residual 1 8 cm invasive cancer  Margins were clear  All  8 sentinel lymph nodes were negative for metastatic disease  Patient received postlumpectomy radiation  Herceptin plus perjeta were continued in the adjuvant setting till April 2023  Letrozole was started in November 2022, for total 10 year duration  She has osteopenia and she is taking calcium and vitamin D and is also on Prolia  Negative genetic testing for significant variant  No history of cancers in her family  Pt's most recent CT CAP from 6/6/2023 did not show any lesions suggestive, abdomen, pelvis    Most recent diagnostic mammogram from March 2023 shows benign findings in both breast  Patient has been tolerating letrozole with no major issues (denied any hot flashes or notable arthralgias)  Patient was recommended to continue letrozole, along with vitamin D and calcium supplementation  Patient was advised to follow-up in office in 3 months with repeat CBC, CMP, and CA 27-29  She was advised to continue with annual diagnostic mammograms     - CBC and differential; Standing  - Comprehensive metabolic panel; Standing  - Cancer antigen 27 29; Standing  - CBC and differential  - Comprehensive metabolic panel  - Cancer antigen 27 29    3  Port-A-Cath in place    Patient has a port in place  She was recommended to continue having it flushed every 6 weeks at the infusion center  4  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[facility] [address] )    Patient has been getting surveillance echocardiogram as she has received trastuzumab and pertuzumab  Patient's last dose of these anti-HER2 agents was on 4/26/2023  Most recent echo from 4/25/2023 showed normal systolic and diastolic function, along with global longitudinal strain  Patient has her next echocardiogram scheduled on 7/25/2023  She was recommended to continue getting echocardiograms on a 6-month basis for 2 years from completing anti-HER2 therapy  Patient and her  were in agreement with the plan as noted above  We will see the patient back in office in about 3 months with above blood work  They know to call the office with any questions or concerns

## 2023-06-14 NOTE — PATIENT INSTRUCTIONS
Please get bloodwork (i e , CBC, CMP, and CA 27 29) every 3 months  You will need echocardiogram every 6 months for 2 years  Please follow-up in office in 3 months

## 2023-06-15 NOTE — TELEPHONE ENCOUNTER
Patient came to office asking if Dr Pily Rincon will be available in the [facility] for a colonoscopy in the last week of august, I told her he is not, but Dr alcaraz that was the person that she saw was avalaible, but she wants her procedure to be done just by  Dr Pily Rincon, she will come back to check sept schedule when open to schedule her procedure
Patient came to office, to schedule her procedure with Dr alcaraz was schedule on 08/30/2023 at [facility] end, patient was given to prep for her choose and she choose miralax 
Pt c/o intermittent back pain radiating to chest.  No SOB, trauma, or recent illness

## 2023-06-16 ENCOUNTER — TELEPHONE (OUTPATIENT)
Dept: HEMATOLOGY ONCOLOGY | Facility: CLINIC | Age: 63
End: 2023-06-16

## 2023-06-16 NOTE — TELEPHONE ENCOUNTER
Patient Call    Who are you speaking with? Patient    If it is not the patient, are they listed on an active communication consent form? N/A   What is the reason for this call? Wants to talk to daylin about taken out her Port and more info   Does this require a call back? Yes   If a call back is required, please list best call back number [phone]   If a call back is required, advise that a message will be forwarded to their care team and someone will return their call as soon as possible  Did you relay this information to the patient?  Yes

## 2023-06-19 ENCOUNTER — TELEPHONE (OUTPATIENT)
Dept: OTHER | Facility: OTHER | Age: 63
End: 2023-06-19

## 2023-06-19 NOTE — TELEPHONE ENCOUNTER
Patient wanted to know if it was OK if she saw another provider other than Mr Grazyna Azar; yes any GI doctor is good

## 2023-06-19 NOTE — TELEPHONE ENCOUNTER
Patient has questions regarding the colonoscopy that Dr Haroon Oliveira advised patient to get  Please follow up with patient, asking for a call back after 1030 in the morning

## 2023-07-06 ENCOUNTER — CLINICAL SUPPORT (OUTPATIENT)
Dept: RADIATION ONCOLOGY | Facility: CLINIC | Age: 63
End: 2023-07-06
Attending: RADIOLOGY
Payer: COMMERCIAL

## 2023-07-06 ENCOUNTER — HOSPITAL ENCOUNTER (OUTPATIENT)
Dept: INFUSION CENTER | Facility: CLINIC | Age: 63
Discharge: HOME | End: 2023-07-06
Payer: COMMERCIAL

## 2023-07-06 VITALS
WEIGHT: 129.19 LBS | HEART RATE: 80 BPM | TEMPERATURE: 98.2 F | RESPIRATION RATE: 18 BRPM | BODY MASS INDEX: 20.76 KG/M2 | OXYGEN SATURATION: 97 % | HEIGHT: 66 IN | DIASTOLIC BLOOD PRESSURE: 77 MMHG | SYSTOLIC BLOOD PRESSURE: 118 MMHG

## 2023-07-06 DIAGNOSIS — Z17.0 MALIGNANT NEOPLASM OF CENTRAL PORTION OF RIGHT BREAST IN FEMALE, ESTROGEN RECEPTOR POSITIVE (HCC): Primary | ICD-10-CM

## 2023-07-06 DIAGNOSIS — C50.111 MALIGNANT NEOPLASM OF CENTRAL PORTION OF RIGHT BREAST IN FEMALE, ESTROGEN RECEPTOR POSITIVE (HCC): Primary | ICD-10-CM

## 2023-07-06 DIAGNOSIS — Z95.828 PORT-A-CATH IN PLACE: Primary | ICD-10-CM

## 2023-07-06 PROCEDURE — 99211 OFF/OP EST MAY X REQ PHY/QHP: CPT | Performed by: RADIOLOGY

## 2023-07-06 PROCEDURE — 96523 IRRIG DRUG DELIVERY DEVICE: CPT

## 2023-07-06 PROCEDURE — 99214 OFFICE O/P EST MOD 30 MIN: CPT | Performed by: RADIOLOGY

## 2023-07-06 RX ORDER — PREDNISONE 20 MG/1
TABLET ORAL
COMMUNITY
Start: 2023-06-24

## 2023-07-06 RX ORDER — AZITHROMYCIN 250 MG/1
TABLET, FILM COATED ORAL
COMMUNITY
Start: 2023-06-24

## 2023-07-06 RX ORDER — MULTIVIT-MIN/IRON FUM/FOLIC AC 7.5 MG-4
1 TABLET ORAL DAILY
COMMUNITY

## 2023-07-06 NOTE — PROGRESS NOTES
Follow-up - Radiation Oncology   Aileen Fatima 1960 59 y.o. female [id number]      History of Present Illness   Cancer Staging   Malignant neoplasm of central portion of right breast in female, estrogen receptor positive ([address])  Staging form: Breast, AJCC 8th Edition  - Clinical stage from 4/6/2022: Stage IB (cT2, cN0, cM0, G2, ER+, WA+, HER2+) - Signed by Gauri Quinonez MD on 4/6/2022  Stage prefix: Initial diagnosis  Method of lymph node assessment: Clinical  Histologic grading system: 3 grade system  - Pathologic stage from 10/18/2022: No Stage Recommended (ypT1c, pN0(sn), cM0, G2, ER+, WA+, HER2+) - Signed by Gauri Quinonez MD on 10/18/2022  Stage prefix: Post-therapy  Method of lymph node assessment: [place] lymph node biopsy  Histologic grading system: 3 grade system              aJs Hernandez 1960 is a 59 y.o. female with triple positive invasive ductal carcinoma of the right breast. She is s/p neoadjuvant chemo followed by lumpectomy and sentinel lymph node biopsy. She completed radiation to the right breast 12/9/22. Last seen in radiation oncology on 1/5/23 with Dr. Yue Saucedo.     3/30/23  Diagnostic Mammogram  FINDINGS:   RIGHT  B) POST-SURGICAL FINDING: There are post-surgical findings from a previous lumpectomy with radiation seen in the right breast.    BILATERAL  There are no suspicious masses, grouped microcalcifications or areas of unexplained architectural distortion. The skin and nipple areolar complex are unremarkable.  A few diffusely distributed calcifications are noted in each breast.   IMPRESSION:   No evidence of malignancy.   ASSESSMENT/BI-RADS CATEGORY:  Left: 2 - Benign  Right: 2 - Benign  Overall: 2 - Benign   RECOMMENDATION:       - Diagnostic mammogram in 1 year for both breasts.     4/19/23  [facility]  Reports some tenderness and decreased ROM.   F/U in 6 months     6/6/23  CT scan chest abdomen pelvis  IMPRESSION:   No metastatic disease in the chest, abdomen or pelvis.     23  Dr. Adriel Landry and Jacque completed 2023. On Letrazole since 2022.   No concerns on exam     Upcomin23  Dr. Radha Purvis  1/10/24  Dr. Ritika Abbott   Oncology History   Malignant neoplasm of central portion of right breast in female, estrogen receptor positive ([address])   3/29/2022 Biopsy    Right breast biopsy:  - Invasive ductal carcinoma  Grade 2  ER 70%   ND 70%  HER2 positive     2022 -  Cancer Staged    Staging form: Breast, AJCC 8th Edition  - Clinical stage from 2022: Stage IB (cT2, cN0, cM0, G2, ER+, ND+, HER2+) - Signed by Nisha Medina MD on 2022  Stage prefix: Initial diagnosis  Method of lymph node assessment: Clinical  Histologic grading system: 3 grade system       2022 -  Chemotherapy    Pegfilgrastim-bmez (ZIEXTENZO), 6 mg, Subcutaneous, Once, 6 of 6 cycles  Administration: 6 mg (2022), 6 mg (2022), 6 mg (2022), 6 mg (2022), 6 mg (2022), 6 mg (2022)  fosaprepitant (EMEND) IVPB, 150 mg, Intravenous, Once, 6 of 6 cycles  Administration: 150 mg (2022), 150 mg (2022), 150 mg (6/15/2022), 150 mg (2022), 150 mg (2022), 150 mg (2022)  pertuzumab (PERJETA) IVPB, 840 mg, Intravenous, Once, 18 of 18 cycles  Administration: 840 mg (2022), 420 mg (2022), 420 mg (2022), 420 mg (6/15/2022), 420 mg (2022), 420 mg (2022), 420 mg (2022), 420 mg (2022), 420 mg (10/19/2022), 420 mg (2022), 420 mg (2022), 420 mg (2022), 420 mg (2023), 420 mg (2023), 420 mg (2023), 420 mg (3/15/2023), 420 mg (2023), 420 mg (2023)  CARBOplatin (PARAPLATIN) IVPB ([facility] AUC DOSING), 588.6 mg, Intravenous, Once, 6 of 6 cycles  Administration: 588.6 mg (2022), 588.6 mg (2022), 588.6 mg (6/15/2022), 582.6 mg (2022), 588.6 mg (2022), 582.6 mg (2022)  trastuzumab-qyyp (TRAZIMERA) chemo infusion, 6 mg/kg = 330 mg (100 % of original dose 6 mg/kg), Intravenous, Once, 11 of 11 cycles  Dose modification: 6 mg/kg (original dose 6 mg/kg, Cycle 8), 6 mg/kg (original dose 6 mg/kg, Cycle 9), 6 mg/kg (original dose 6 mg/kg, Cycle 12)  Administration: 330 mg (9/28/2022), 330 mg (10/19/2022), 330 mg (11/9/2022), 361 mg (11/30/2022), 361 mg (12/21/2022), 361 mg (1/11/2023), 361 mg (2/1/2023), 361 mg (2/22/2023), 361 mg (3/15/2023), 361 mg (4/5/2023), 361 mg (4/26/2023)  DOCEtaxel (TAXOTERE) chemo infusion, 75 mg/m2 = 120 mg, Intravenous, Once, 6 of 6 cycles  Administration: 120 mg (5/4/2022), 120 mg (5/25/2022), 120 mg (6/15/2022), 120 mg (7/6/2022), 120 mg (7/27/2022), 120 mg (8/17/2022)  trastuzumab (HERCEPTIN) chemo infusion, 439 mg, Intravenous, Once, 7 of 7 cycles  Administration: 450 mg (5/4/2022), 330 mg (5/25/2022), 330 mg (9/7/2022), 330 mg (6/15/2022), 330 mg (7/6/2022), 330 mg (7/27/2022), 330 mg (8/17/2022)     8/2022 Genetic Testing    [facility] BRCAplus STAT Panel (8 genes): ASHU, BRCA1, BRCA2, CDH1, CHEK2, PALB2, PTEN, TP53 with reflex to [facility] CancerNext + RNA (28 additional genes): APC, AXIN2 BARD1, BRIP1, BMPR1A, CDK4, CDKN2A, DICER1, EPCAM, GREM1, HOXB13, MLH1, MSH2, MSH3, MSH6, MUTYH, NBN, NF1, NTHL1, PMS2, POLD1, POLE, RAD51C, RAD51D, RECQL SMAD4, SMARCA4, STK11    Negative     9/30/2022 Surgery    Right lumpectomy with sentinel lymph node biopsy:  - clear margins  - 0/8 lymph nodes    Dr. Tyesha Heath     10/18/2022 -  Cancer Staged    Staging form: Breast, AJCC 8th Edition  - Pathologic stage from 10/18/2022: No Stage Recommended (ypT1c, pN0(sn), cM0, G2, ER+, SC+, HER2+) - Signed by Randi Turcios MD on 10/18/2022  Stage prefix: Post-therapy  Method of lymph node assessment: [place] lymph node biopsy  Histologic grading system: 3 grade system       11/11/2022 - 12/9/2022 Radiation    Plan ID Energy Fractions Dose per Fraction (cGy) Dose Correction (cGy) Total Dose Delivered (cGy) Elapsed Days   R Breast 6X 16 / 16 266 0 4,256 24   R Brst Boost 12E 4 / 4 250 0 1,000 3   Dr. Tovar Letters     11/2022 -  Hormone Therapy    Letrozole   Dr. Naomi eVrde         Past Medical History:   Diagnosis Date   • Anemia March 28,2022    Due to infusion therapy?    • Asthma 2018 2018 to Jan 2021, i was diagnosed with asthma, jan 2021, i did nit have asthma according to the breathing test the asthma doctor performed, i get allergiy shot every 2 weeks for 2 different types of molds   • Breast cancer ([address])    • Cancer ([address]) 3/29/2022    Diagnosed with breast cancer   • Diarrhea     occasional   • Port-A-Cath in place     R side chest     Past Surgical History:   Procedure Laterality Date   • BREAST BIOPSY Right 03/29/2022    IDC   • BREAST LUMPECTOMY Right 09/30/2022    Procedure: ML BREAST;  Surgeon: Sharlene Moritz, MD;  Location: [facility];  Service: Surgical Oncology   • CATARACT EXTRACTION Left 2019    with lens implant   • IR PORT PLACEMENT  04/20/2022   • LYMPH NODE BIOPSY Right 09/30/2022    Procedure: SLN BX;  Surgeon: Sharlene Moritz, MD;  Location: [facility];  Service: Surgical Oncology   • MANDIBLE SURGERY      jaw surgery for crooked jaw 1993, 1998   • ROTATOR CUFF REPAIR Right 2020   • US BREAST ML  NEEDLE LOC RIGHT Right 03/29/2022   • US GUIDED BREAST BIOPSY RIGHT COMPLETE Right 03/29/2022       Social History   Social History     Substance and Sexual Activity   Alcohol Use Not Currently     Social History     Substance and Sexual Activity   Drug Use Never     Social History     Tobacco Use   Smoking Status Never   • Passive exposure: Never   Smokeless Tobacco Never         Meds/Allergies     Current Outpatient Medications:   •  CALCIUM PO, Take by mouth, Disp: , Rfl:   •  denosumab (Prolia) 60 mg/mL, as directed Subcutaneous, Disp: , Rfl:   •  EPINEPHrine (EPIPEN) 0.3 mg/0.3 mL SOAJ, , Disp: , Rfl:   •  letrozole (FEMARA) 2.5 mg tablet, Take 1 tablet (2.5 mg total) by mouth daily, Disp: 30 tablet, Rfl: 5  •  loperamide (IMODIUM) 2 mg capsule, Take 2 mg by mouth 4 (four) times a day as needed for diarrhea, Disp: , Rfl:   •  Multiple Vitamins-Minerals (multivitamin with minerals) tablet, Take 1 tablet by mouth daily, Disp: , Rfl:   •  sodium chloride (OCEAN) 0.65 % nasal spray, Saline Nasal Mist, Disp: , Rfl:   •  VITAMIN D PO, Take by mouth, Disp: , Rfl:   •  albuterol (ACCUNEB) 1.25 MG/3ML nebulizer solution, Take 1.25 mg by nebulization every 6 (six) hours as needed for wheezing (Patient not taking: Reported on 7/6/2023), Disp: , Rfl:   •  albuterol (PROVENTIL HFA,VENTOLIN HFA) 90 mcg/act inhaler, , Disp: , Rfl:   •  azithromycin (ZITHROMAX) 250 mg tablet, , Disp: , Rfl:   •  lidocaine-prilocaine (EMLA) cream, Apply topically on the skin over the Port-A-Cath as advised (Patient not taking: Reported on 7/6/2023), Disp: 30 g, Rfl: 1  •  predniSONE 20 mg tablet, , Disp: , Rfl:   Allergies   Allergen Reactions   • Molds & Smuts Hives and Anaphylaxis     Sore throat  Sore throat     • Other Other (See Comments), Anaphylaxis and Hives     Mold/gets sore throat   • Nuts - Food Allergy Hives     BRAZILIAN NUTS             Review of Systems   Constitutional: Negative. Negative for appetite change, chills, fatigue, fever and unexpected weight change. HENT: Negative. Eyes: Negative. Negative for visual disturbance. Uses glasses with driving  Left eye cataract surgery in 2018   Respiratory: Positive for cough (moist cough-green phlegm) and shortness of breath. S/p cold/ feeling sick after vacation. Completed abx and prednisone. Cardiovascular: Negative for chest pain and leg swelling. Gastrointestinal: Positive for diarrhea (uses imodium at times). Negative for nausea and vomiting. Endocrine: Negative for cold intolerance and heat intolerance. Genitourinary: Positive for frequency (with increased fluid intake). Musculoskeletal: Positive for arthralgias (right wrist discomfort) and back pain (lower back pain at times uses heading pad ). Continues with tenderness in right breast with movement and positioning. Worked with PT for exercises for decreased ROM to right arm- has improved. Denies any numbness or tingling. Denies any swelling to right arm. Skin: Negative. Allergic/Immunologic: Positive for environmental allergies and food allergies. Neurological: Negative for dizziness, light-headedness, numbness and headaches. Hematological: Does not bruise/bleed easily. Psychiatric/Behavioral: Positive for sleep disturbance (some nights has "insomnia"). The patient is nervous/anxious and is hyperactive.           OBJECTIVE:   /77 (BP Location: Left arm, Patient Position: Sitting, Cuff Size: Standard)   Pulse 80   Temp 98.2 °F (36.8 °C) (Temporal)   Resp 18   Ht 5' 5.51" (1.664 m)   Wt 58.6 kg (129 lb 3 oz)   SpO2 97%   BMI 21.16 kg/m²   Pain Assessment:  0  ECOG/[place]/WHO: 0 - Asymptomatic    Physical Exam   No supraclavicular or axillary adenopathy palpable. The skin in the radiated field is in good condition. No suspicious lesions palpable in the breast.  She has good range of motion of her right upper extremity without evidence of lymphedema. Abdomen soft nontender. Ambulating independently        RESULTS    Lab Results: No results found for this or any previous visit (from the past 672 hour(s)). Imaging Studies:No results found. Assessment/Plan:  No orders of the defined types were placed in this encounter. John Bahena is a 59y.o. year old female who is 8 months status post radiation therapy for right breast carcinoma. She has no clinical evidence of recurrence. She remains on letrozole with good tolerance. Mammogram from 3/30/2023 returned stable. She will return for follow-up visit in 1 year as she will be seeing medical and surgical oncology in the interim.       Hermila Mahajan MD  7/6/2023,2:14 PM    Portions of the record may have been created with voice recognition software.  Occasional wrong word or "sound a like" substitutions may have occurred due to the inherent limitations of voice recognition software.  Read the chart carefully and recognize, using context, where substitutions have occurred.

## 2023-07-06 NOTE — PROGRESS NOTES
Per Helton 1960 is a 59 y.o. female with triple positive invasive ductal carcinoma of the right breast. She is s/p neoadjuvant chemo followed by lumpectomy and sentinel lymph node biopsy. She completed radiation to the right breast 22. Last seen in radiation oncology on 23 with Dr. Lilli Sandhoff. 3/30/23  Diagnostic Mammogram  FINDINGS:   RIGHT  B) POST-SURGICAL FINDING: There are post-surgical findings from a previous lumpectomy with radiation seen in the right breast.    BILATERAL  There are no suspicious masses, grouped microcalcifications or areas of unexplained architectural distortion. The skin and nipple areolar complex are unremarkable. A few diffusely distributed calcifications are noted in each breast.   IMPRESSION:   No evidence of malignancy. ASSESSMENT/BI-RADS CATEGORY:  Left: 2 - Benign  Right: 2 - Benign  Overall: 2 - Benign   RECOMMENDATION:       - Diagnostic mammogram in 1 year for both breasts. 23  [facility], Surgical Oncology  Reports some tenderness and decreased ROM. F/U in 6 months    23  CT scan chest abdomen pelvis  IMPRESSION:   No metastatic disease in the chest, abdomen or pelvis. 23  Dr. Rajan Jo and Jacque completed 2023. On Letrazole since 2022.   No concerns on exam    Upcomin23  Dr. Lizbeth Dale  1/10/24  Dr. Jade Herbert        Oncology History   Malignant neoplasm of central portion of right breast in female, estrogen receptor positive ([address])   3/29/2022 Biopsy    Right breast biopsy:  - Invasive ductal carcinoma  Grade 2  ER 70%   AZ 70%  HER2 positive     2022 -  Cancer Staged    Staging form: Breast, AJCC 8th Edition  - Clinical stage from 2022: Stage IB (cT2, cN0, cM0, G2, ER+, AZ+, HER2+) - Signed by Andrzej Lynch MD on 2022  Stage prefix: Initial diagnosis  Method of lymph node assessment: Clinical  Histologic grading system: 3 grade system       2022 -  Chemotherapy    Pegfilgrastim-bmez (ZIEXTENZO), 6 mg, Subcutaneous, Once, 6 of 6 cycles  Administration: 6 mg (5/5/2022), 6 mg (5/26/2022), 6 mg (6/16/2022), 6 mg (7/7/2022), 6 mg (7/28/2022), 6 mg (8/18/2022)  fosaprepitant (EMEND) IVPB, 150 mg, Intravenous, Once, 6 of 6 cycles  Administration: 150 mg (5/4/2022), 150 mg (5/25/2022), 150 mg (6/15/2022), 150 mg (7/6/2022), 150 mg (7/27/2022), 150 mg (8/17/2022)  pertuzumab (PERJETA) IVPB, 840 mg, Intravenous, Once, 18 of 18 cycles  Administration: 840 mg (5/4/2022), 420 mg (5/25/2022), 420 mg (9/7/2022), 420 mg (6/15/2022), 420 mg (7/6/2022), 420 mg (7/27/2022), 420 mg (8/17/2022), 420 mg (9/28/2022), 420 mg (10/19/2022), 420 mg (11/9/2022), 420 mg (11/30/2022), 420 mg (12/21/2022), 420 mg (1/11/2023), 420 mg (2/1/2023), 420 mg (2/22/2023), 420 mg (3/15/2023), 420 mg (4/5/2023), 420 mg (4/26/2023)  CARBOplatin (PARAPLATIN) IVPB (GOG AUC DOSING), 588.6 mg, Intravenous, Once, 6 of 6 cycles  Administration: 588.6 mg (5/4/2022), 588.6 mg (5/25/2022), 588.6 mg (6/15/2022), 582.6 mg (7/6/2022), 588.6 mg (7/27/2022), 582.6 mg (8/17/2022)  trastuzumab-qyyp (TRAZIMERA) chemo infusion, 6 mg/kg = 330 mg (100 % of original dose 6 mg/kg), Intravenous, Once, 11 of 11 cycles  Dose modification: 6 mg/kg (original dose 6 mg/kg, Cycle 8), 6 mg/kg (original dose 6 mg/kg, Cycle 9), 6 mg/kg (original dose 6 mg/kg, Cycle 12)  Administration: 330 mg (9/28/2022), 330 mg (10/19/2022), 330 mg (11/9/2022), 361 mg (11/30/2022), 361 mg (12/21/2022), 361 mg (1/11/2023), 361 mg (2/1/2023), 361 mg (2/22/2023), 361 mg (3/15/2023), 361 mg (4/5/2023), 361 mg (4/26/2023)  DOCEtaxel (TAXOTERE) chemo infusion, 75 mg/m2 = 120 mg, Intravenous, Once, 6 of 6 cycles  Administration: 120 mg (5/4/2022), 120 mg (5/25/2022), 120 mg (6/15/2022), 120 mg (7/6/2022), 120 mg (7/27/2022), 120 mg (8/17/2022)  trastuzumab (HERCEPTIN) chemo infusion, 439 mg, Intravenous, Once, 7 of 7 cycles  Administration: 450 mg (5/4/2022), 330 mg (5/25/2022), 330 mg (9/7/2022), 330 mg (6/15/2022), 330 mg (7/6/2022), 330 mg (7/27/2022), 330 mg (8/17/2022)     8/2022 Genetic Testing    [facility] BRCAplus STAT Panel (8 genes): ASHU, BRCA1, BRCA2, CDH1, CHEK2, PALB2, PTEN, TP53 with reflex to [facility] CancerNext + RNA (28 additional genes): APC, AXIN2 BARD1, BRIP1, BMPR1A, CDK4, CDKN2A, DICER1, EPCAM, GREM1, HOXB13, MLH1, MSH2, MSH3, MSH6, MUTYH, NBN, NF1, NTHL1, PMS2, POLD1, POLE, RAD51C, RAD51D, RECQL SMAD4, SMARCA4, STK11    Negative     9/30/2022 Surgery    Right lumpectomy with sentinel lymph node biopsy:  - clear margins  - 0/8 lymph nodes    Dr. Dov Zabala     10/18/2022 -  Cancer Staged    Staging form: Breast, AJCC 8th Edition  - Pathologic stage from 10/18/2022: No Stage Recommended (ypT1c, pN0(sn), cM0, G2, ER+, WI+, HER2+) - Signed by Gauri Quinonez MD on 10/18/2022  Stage prefix: Post-therapy  Method of lymph node assessment: Loiza lymph node biopsy  Histologic grading system: 3 grade system       11/11/2022 - 12/9/2022 Radiation    Plan ID Energy Fractions Dose per Fraction (cGy) Dose Correction (cGy) Total Dose Delivered (cGy) Elapsed Days   R Breast 6X 16 / 16 266 0 4,256 24   R Brst Boost 12E 4 / 4 250 0 1,000 3   Dr. Yue Saucedo     11/2022 -  Hormone Therapy    Letrozole   Dr. Darci Davidson         Review of Systems:  Review of Systems   Constitutional: Negative. Negative for appetite change, chills, fatigue, fever and unexpected weight change. HENT: Negative. Eyes: Negative. Negative for visual disturbance. Uses glasses with driving  Left eye cataract surgery in 2018   Respiratory: Positive for cough (moist cough-green phlegm) and shortness of breath. S/p cold/ feeling sick after vacation. Completed abx and prednisone. Cardiovascular: Negative for chest pain and leg swelling. Gastrointestinal: Positive for diarrhea (uses imodium at times). Negative for nausea and vomiting. Endocrine: Negative for cold intolerance and heat intolerance.    Genitourinary: Positive for frequency (with increased fluid intake). Musculoskeletal: Positive for arthralgias (right wrist discomfort) and back pain (lower back pain at times uses heading pad ). Continues with tenderness in right breast with movement and positioning. Worked with PT for exercises for decreased ROM to right arm- has improved. Denies any numbness or tingling. Denies any swelling to right arm. Skin: Negative. Allergic/Immunologic: Positive for environmental allergies and food allergies. Neurological: Negative for dizziness, light-headedness, numbness and headaches. Hematological: Does not bruise/bleed easily. Psychiatric/Behavioral: Positive for sleep disturbance (some nights has "insomnia"). The patient is nervous/anxious and is hyperactive.         Clinical Trial: no    Teaching completed    Health Maintenance   Topic Date Due   • Hepatitis C Screening  Never done   • HIV Screening  Never done   • Annual Physical  Never done   • Colorectal Cancer Screening  Never done   • Pneumococcal Vaccine: Pediatrics (0 to 5 Years) and At-Risk Patients (6 to 59 Years) (3 - PPSV23 if available, else PCV20) 10/06/2020   • Influenza Vaccine (1) 09/01/2023   • ONC Colorectal Surgery Screening  10/18/2023   • Breast Cancer Survivorship Visit  10/18/2023   • ONC Cervical Cancer Screening  10/18/2023   • ONC DXA Scan  10/18/2023   • Cervical Cancer Screening  04/19/2024 (Originally 2/18/1981)   • Breast Cancer Screening: Mammogram  03/30/2024   • BMI: Adult  06/14/2024   • Depression Screening  07/06/2024   • DTaP,Tdap,and Td Vaccines (2 - Td or Tdap) 11/16/2028   • Osteoporosis Screening  Completed   • COVID-19 Vaccine  Completed   • ONC Physical Therapy Referral  Addressed   • HIB Vaccine  Aged Out   • IPV Vaccine  Aged Out   • Hepatitis A Vaccine  Aged Out   • Meningococcal ACWY Vaccine  Aged Out   • HPV Vaccine  Aged Out     Patient Active Problem List   Diagnosis   • Malignant neoplasm of central portion of right breast in female, estrogen receptor positive ([address])   • Chemotherapy induced neutropenia (HCC)   • Chemotherapy-induced nausea   • Port-A-Cath in place   • 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    • Asthma 2018 2018 to Jan 2021, i was diagnosed with asthma, jan 2021, i did nit have asthma according to the breathing test the asthma doctor performed, i get allergiy shot every 2 weeks for 2 different types of molds   • Breast cancer ([address])    • Cancer ([address]) 3/29/2022    Diagnosed with breast cancer   • Diarrhea     occasional   • Port-A-Cath in place     R side chest     Past Surgical History:   Procedure Laterality Date   • BREAST BIOPSY Right 03/29/2022    IDC   • BREAST LUMPECTOMY Right 09/30/2022    Procedure: ML BREAST;  Surgeon: Opal Harris MD;  Location: [facility];  Service: Surgical Oncology   • CATARACT EXTRACTION Left 2019    with lens implant   • IR PORT PLACEMENT  04/20/2022   • LYMPH NODE BIOPSY Right 09/30/2022    Procedure: SLN BX;  Surgeon: Opal Harris MD;  Location: [facility];  Service: Surgical Oncology   • MANDIBLE SURGERY      jaw surgery for crooked jaw 1993, 1998   • ROTATOR CUFF REPAIR Right 2020   • US BREAST ML  NEEDLE LOC RIGHT Right 03/29/2022   • US GUIDED BREAST BIOPSY RIGHT COMPLETE Right 03/29/2022     Family History   Problem Relation Age of Onset   • Prostate cancer Father    • Asthma Father         He has had asthma previously   • No Known Problems Sister    • Asthma Brother         Has had asthma previously   • No Known Problems Daughter    • No Known Problems Daughter    • Lung cancer Maternal Aunt    • No Known Problems Paternal Aunt    • No Known Problems Paternal Aunt    • No Known Problems Maternal Grandmother    • No Known Problems Maternal Grandfather    • Breast cancer Paternal Grandmother         I was told she had breast cancer in her 80’s.    • No Known Problems Paternal Grandfather    • Stomach cancer Other      Social History     Socioeconomic History   • Marital status: /Civil Union     Spouse name: Not on file   • Number of children: Not on file   • Years of education: Not on file   • Highest education level: Not on file   Occupational History   • Not on file   Tobacco Use   • Smoking status: Never     Passive exposure: Never   • Smokeless tobacco: Never   Vaping Use   • Vaping Use: Never used   Substance and Sexual Activity   • Alcohol use: Not Currently   • Drug use: Never   • Sexual activity: Not Currently     Partners: Male     Birth control/protection: Condom Male   Other Topics Concern   • Not on file   Social History Narrative   • Not on file     Social Determinants of Health     Financial Resource Strain: Not on file   Food Insecurity: Not on file   Transportation Needs: Not on file   Physical Activity: Not on file   Stress: Not on file   Social Connections: Not on file   Intimate Partner Violence: Not on file   Housing Stability: Not on file       Current Outpatient Medications:   •  CALCIUM PO, Take by mouth, Disp: , Rfl:   •  denosumab (Prolia) 60 mg/mL, as directed Subcutaneous, Disp: , Rfl:   •  EPINEPHrine (EPIPEN) 0.3 mg/0.3 mL SOAJ, , Disp: , Rfl:   •  letrozole (FEMARA) 2.5 mg tablet, Take 1 tablet (2.5 mg total) by mouth daily, Disp: 30 tablet, Rfl: 5  •  loperamide (IMODIUM) 2 mg capsule, Take 2 mg by mouth 4 (four) times a day as needed for diarrhea, Disp: , Rfl:   •  Multiple Vitamins-Minerals (multivitamin with minerals) tablet, Take 1 tablet by mouth daily, Disp: , Rfl:   •  sodium chloride (OCEAN) 0.65 % nasal spray, Saline Nasal Mist, Disp: , Rfl:   •  VITAMIN D PO, Take by mouth, Disp: , Rfl:   •  albuterol (ACCUNEB) 1.25 MG/3ML nebulizer solution, Take 1.25 mg by nebulization every 6 (six) hours as needed for wheezing (Patient not taking: Reported on 7/6/2023), Disp: , Rfl:   •  albuterol (PROVENTIL HFA,VENTOLIN HFA) 90 mcg/act inhaler, , Disp: , Rfl:   •  azithromycin (ZITHROMAX) 250 mg tablet, , Disp: , Rfl:   •  lidocaine-prilocaine (EMLA) cream, Apply topically on the skin over the Port-A-Cath as advised (Patient not taking: Reported on 7/6/2023), Disp: 30 g, Rfl: 1  •  predniSONE 20 mg tablet, , Disp: , Rfl:   Allergies   Allergen Reactions   • Molds & Smuts Hives and Anaphylaxis     Sore throat  Sore throat     • Other Other (See Comments), Anaphylaxis and Hives     Mold/gets sore throat   • Nuts - Food Allergy Hives     BRAZILIAN NUTS         Vitals:    07/06/23 1324   BP: 118/77   BP Location: Left arm   Patient Position: Sitting   Cuff Size: Standard   Pulse: 80   Resp: 18   Temp: 98.2 °F (36.8 °C)   TempSrc: Temporal   SpO2: 97%   Weight: 58.6 kg (129 lb 3 oz)   Height: 5' 5.51" (1.664 m)      Pain Score:   3

## 2023-07-18 ENCOUNTER — OFFICE VISIT (OUTPATIENT)
Dept: PHYSICAL THERAPY | Facility: REHABILITATION | Age: 63
End: 2023-07-18
Payer: COMMERCIAL

## 2023-07-18 DIAGNOSIS — C50.111 MALIGNANT NEOPLASM OF CENTRAL PORTION OF RIGHT BREAST IN FEMALE, ESTROGEN RECEPTOR POSITIVE (HCC): Primary | ICD-10-CM

## 2023-07-18 DIAGNOSIS — Z17.0 MALIGNANT NEOPLASM OF CENTRAL PORTION OF RIGHT BREAST IN FEMALE, ESTROGEN RECEPTOR POSITIVE (HCC): Primary | ICD-10-CM

## 2023-07-18 PROCEDURE — 97530 THERAPEUTIC ACTIVITIES: CPT | Performed by: PHYSICAL THERAPIST

## 2023-07-18 PROCEDURE — 97110 THERAPEUTIC EXERCISES: CPT | Performed by: PHYSICAL THERAPIST

## 2023-07-18 NOTE — PROGRESS NOTES
Daily Note     Today's date: 2023  Patient name: Raquel Craft  : 1960  MRN: [id number]  Referring provider: HUSSAIN Angulo  Dx:   Encounter Diagnosis     ICD-10-CM    1. Malignant neoplasm of central portion of right breast in female, estrogen receptor positive ([address])  C50.111     Z17.0                      Subjective: Flew to [place] for vacation and walked a lot but did not perform UE strengthening exercises. Objective: See treatment diary below. No visible changes in R arm surface anatomy. Assessment: No change in UE lymphedema (none present) despite airplane travel however patient still expressed high concern for lymphedema risk. Therefore, provided additional education and reassurance and risk measurement, risk reduction. Resumed strengthening exercise today with good tolerance and reviewed how to monitor for adverse symptoms (pain, lymphedema), how to progress; how to regress if there is an exercise holiday. Patient verbalizes understanding and demonstrates good knowledge of dosage of weight and reps for exercise. Plan: discharge today. Precautions: h/o R breast CA  Access Code: [email or web address]  URL: [email or web address]  Date: 2023  Prepared by: Farzad Ogden    Exercises  - Elliptical  - 3 x weekly  - Scaption with Dumbbells  - 3 x weekly - 2 sets - 10 reps  - Standing Bent Over Bilateral Shoulder Row with Dumbbells  - 3 x weekly - 2 sets - 10 reps  - Shoulder Overhead Press in Abduction with Dumbbells  - 3 x weekly - 2 sets - 10 reps  - Standing Bent Over Triceps Extension  - 3 x weekly - 2 sets - 10 reps    Date of Service     Manual Ther         Axillary scar mob 5'                 Lymph baseline volumetrics   completed                                 Ther Ex         Supine shoulder stretch w DB Butterfly 2x1' HEP Butterfly 2x1' Butterfly 2x1'      Seated thoracic ext x10 HEP x10 seated  3x10 supine w FR x10 seated  3x10 supine w FR      Shoulder strengthening  UBE 4'/2' L1.5  scaption 3# 2x10  Bent over row 8# 2x10  overhead press 3# 2x10  Bent over triceps ext 3# 2x10 UBE 3'/3' L1.5    scaption 3# 2x10  Bent over row 8# 2x10  overhead press 3# 2x10  Bent over triceps ext 3# 2x10  UBE 4'/4'  Overhead press 4# x10  Triceps ext 4# x20  Bicep curl 4#x20  Standing shldr abd 4# x20      Lower quarterstrengthening     TM walking 5' 2.2 mph  SL heel raise x20 ea  Forward lunge x20 ea    Ther Activity & Self Care                                             Pt Education         [facility]    Education session 61' 15'    Lymphedema screening   5' education

## 2023-07-25 ENCOUNTER — HOSPITAL ENCOUNTER (OUTPATIENT)
Dept: NON INVASIVE DIAGNOSTICS | Facility: CLINIC | Age: 63
Discharge: HOME | End: 2023-07-25
Payer: COMMERCIAL

## 2023-07-25 VITALS
DIASTOLIC BLOOD PRESSURE: 77 MMHG | HEART RATE: 70 BPM | BODY MASS INDEX: 20.73 KG/M2 | HEIGHT: 66 IN | WEIGHT: 129 LBS | SYSTOLIC BLOOD PRESSURE: 118 MMHG

## 2023-07-25 DIAGNOSIS — I42.7 CHEMOTHERAPY INDUCED CARDIOMYOPATHY (HCC): ICD-10-CM

## 2023-07-25 DIAGNOSIS — T45.1X5A CHEMOTHERAPY INDUCED CARDIOMYOPATHY (HCC): ICD-10-CM

## 2023-07-25 LAB
AORTIC ROOT: 2.5 CM
APICAL FOUR CHAMBER EJECTION FRACTION: 63 %
E WAVE DECELERATION TIME: 247 MS
FRACTIONAL SHORTENING: 38 % (ref 28–44)
GLOBAL LONGITUIDAL STRAIN: -17 %
INTERVENTRICULAR SEPTUM IN DIASTOLE (PARASTERNAL SHORT AXIS VIEW): 0.9 CM
INTERVENTRICULAR SEPTUM: 0.9 CM (ref 0.6–1.1)
LAAS-AP2: 9.6 CM2
LAAS-AP4: 10.2 CM2
LEFT ATRIUM AREA SYSTOLE SINGLE PLANE A4C: 8.9 CM2
LEFT ATRIUM SIZE: 2.2 CM
LEFT ATRIUM VOLUME (MOD BIPLANE): 23 ML
LEFT INTERNAL DIMENSION IN SYSTOLE: 2.5 CM (ref 2.1–4)
LEFT VENTRICULAR INTERNAL DIMENSION IN DIASTOLE: 4 CM (ref 3.5–6)
LEFT VENTRICULAR POSTERIOR WALL IN END DIASTOLE: 0.9 CM
LEFT VENTRICULAR STROKE VOLUME: 47 ML
LVSV (TEICH): 47 ML
MV E'TISSUE VEL-SEP: 9 CM/S
MV PEAK A VEL: 0.57 M/S
MV PEAK E VEL: 46 CM/S
MV STENOSIS PRESSURE HALF TIME: 72 MS
MV VALVE AREA P 1/2 METHOD: 3.06 CM2
RIGHT ATRIUM AREA SYSTOLE A4C: 6.1 CM2
RIGHT VENTRICLE ID DIMENSION: 3.5 CM
SL CV LEFT ATRIUM LENGTH A2C: 2.8 CM
SL CV LV EF: 55
SL CV PED ECHO LEFT VENTRICLE DIASTOLIC VOLUME (MOD BIPLANE) 2D: 69 ML
SL CV PED ECHO LEFT VENTRICLE SYSTOLIC VOLUME (MOD BIPLANE) 2D: 21 ML
TR MAX PG: 16 MMHG
TR PEAK VELOCITY: 2 M/S
TRICUSPID ANNULAR PLANE SYSTOLIC EXCURSION: 2.1 CM
TRICUSPID VALVE PEAK REGURGITATION VELOCITY: 2.03 M/S

## 2023-07-25 PROCEDURE — 93308 TTE F-UP OR LMTD: CPT | Performed by: INTERNAL MEDICINE

## 2023-07-25 PROCEDURE — 93356 MYOCRD STRAIN IMG SPCKL TRCK: CPT | Performed by: INTERNAL MEDICINE

## 2023-07-25 PROCEDURE — 93321 DOPPLER ECHO F-UP/LMTD STD: CPT | Performed by: INTERNAL MEDICINE

## 2023-07-25 PROCEDURE — 93356 MYOCRD STRAIN IMG SPCKL TRCK: CPT

## 2023-07-25 PROCEDURE — 93308 TTE F-UP OR LMTD: CPT

## 2023-07-25 PROCEDURE — 93325 DOPPLER ECHO COLOR FLOW MAPG: CPT | Performed by: INTERNAL MEDICINE

## 2023-07-31 ENCOUNTER — TELEPHONE (OUTPATIENT)
Dept: HEMATOLOGY ONCOLOGY | Facility: CLINIC | Age: 63
End: 2023-07-31

## 2023-07-31 ENCOUNTER — HOSPITAL ENCOUNTER (OUTPATIENT)
Dept: INFUSION CENTER | Facility: CLINIC | Age: 63
Discharge: HOME | End: 2023-07-31

## 2023-07-31 VITALS
DIASTOLIC BLOOD PRESSURE: 85 MMHG | TEMPERATURE: 97.4 F | HEART RATE: 79 BPM | RESPIRATION RATE: 18 BRPM | SYSTOLIC BLOOD PRESSURE: 120 MMHG

## 2023-07-31 DIAGNOSIS — Z17.0 MALIGNANT NEOPLASM OF CENTRAL PORTION OF RIGHT BREAST IN FEMALE, ESTROGEN RECEPTOR POSITIVE (HCC): ICD-10-CM

## 2023-07-31 DIAGNOSIS — M81.8 OSTEOPOROSIS DUE TO AROMATASE INHIBITOR: Primary | ICD-10-CM

## 2023-07-31 DIAGNOSIS — T38.6X5A OSTEOPOROSIS DUE TO AROMATASE INHIBITOR: Primary | ICD-10-CM

## 2023-07-31 DIAGNOSIS — C50.111 MALIGNANT NEOPLASM OF CENTRAL PORTION OF RIGHT BREAST IN FEMALE, ESTROGEN RECEPTOR POSITIVE (HCC): ICD-10-CM

## 2023-07-31 RX ORDER — LETROZOLE 2.5 MG/1
2.5 TABLET, FILM COATED ORAL DAILY
Qty: 30 TABLET | Refills: 5 | Status: SHIPPED | OUTPATIENT
Start: 2023-07-31

## 2023-07-31 NOTE — TELEPHONE ENCOUNTER
Patient Call    Who are you speaking with? self   If it is not the patient, are they listed on an active communication consent form? self   What is the reason for this call? Can generic of imodium be used? [employer] brand   Does this require a call back? yes   If a call back is required, please list best call back number [phone]   If a call back is required, advise that a message will be forwarded to their care team and someone will return their call as soon as possible. Did you relay this information to the patient?  yes

## 2023-07-31 NOTE — TELEPHONE ENCOUNTER
Medication Refill Request   Who are you speaking with? self   If it is not the patient, are they listed on an active communication consent form? self   Which medication is being requested for refill? Please list medication name and dosage letrozole (FEMARA) 2.5 mg tablet        How many pills does the patient have left? 5   Preferred Pharmacy / [address] [address]   Phone:  [phone]  Fax:  [phone]    Who is the prescribing provider?  Eren   Call back number [phone]   Relevant Information refill

## 2023-08-11 ENCOUNTER — TELEPHONE (OUTPATIENT)
Dept: GASTROENTEROLOGY | Facility: MEDICAL CENTER | Age: 63
End: 2023-08-11

## 2023-08-11 NOTE — TELEPHONE ENCOUNTER
LVM attempting to confirm 8/30 procedure, informing that a  is needed and that prep instructions sent via [address] [facility]. Also informed patient to please call office for [address] [facility],[address] questions to be asked.

## 2023-08-14 ENCOUNTER — TELEPHONE (OUTPATIENT)
Dept: GASTROENTEROLOGY | Facility: MEDICAL CENTER | Age: 63
End: 2023-08-14

## 2023-08-14 ENCOUNTER — TELEPHONE (OUTPATIENT)
Age: 63
End: 2023-08-14

## 2023-08-14 ENCOUNTER — TELEPHONE (OUTPATIENT)
Dept: CARDIOLOGY CLINIC | Facility: CLINIC | Age: 63
End: 2023-08-14

## 2023-08-14 NOTE — TELEPHONE ENCOUNTER
Our mutual patient is scheduled for procedure:     Procedure: colonoscopy      Date: 08/30/2023     With: Dr Latha De Leon office is requesting cardiac clearance for their upcoming procedure. Physician Approving clearance: ________________________    Any Questions please call us at [phone]    Your reply to this message will be accepted as clearance. Thank you.

## 2023-08-14 NOTE — TELEPHONE ENCOUNTER
Patients GI provider:  Dr. Swenson Cashing    Number to return call: [phone]    Reason for call: Pt goes to [address] [place] [address] to check her heart every 3 mths due to cancer treatments. You may call this office at the above number. Please seek clearance if needed.     Scheduled procedure/appointment date if applicable: Procedure 1/38/64

## 2023-08-14 NOTE — TELEPHONE ENCOUNTER
[address] [facility],[address] Assessment    Name: Ronald De La Garza  YOB: 1960  Last Height: 5' 5.51" (1.664 m)  Last weight: 58.5 kg (129 lb)  BMI: 21.13 kg/m²  Diagnosis:  Date of procedure: 8/30/23  Prep: Miralax/Dulcolax  Responsible : Simón Rankin ()  Phone#: [phone]  Name completing form: Nikki Tim  Date form completed: 08/14/23      If the patient answers yes to any of these questions, schedule in a hospital  Are you pregnant: No  Do you rely on a wheelchair for mobility: No  Have you been diagnosed with End Stage Renal Disease (ESRD): No  Do you need oxygen during the day: No  Have you had a heart attack or stroke within the past three months: No  Have you had a seizure within the past three months: No  Have you ever been informed by anesthesia that you have a difficult airway: No  Additional Questions  Have you had any cardiac testing or are under the care of a Cardiologist (see cardiac list): Yes (Comment: Obtain Cardiac Clearance)  Cardiac list:   Do you have an implanted cardiac defibrillator: No (Comment:  This patient should be scheduled in the hospital)    Have any bleeding problems, such as anemia or hemophilia (If patient has H&H result below 8, schedule in hospital.  H&H must be within 30 days of procedure): No    Had an organ transplant within the past 3 months: No    Do you have any present infections: No  Do you get short of breath when walking a few blocks: No  Have you been diagnosed with diabetes: No  Comments (provide cardiac provider information if applicable): Pt goes to [facility] to check her heart every 3 mths due to cancer treatment

## 2023-08-15 NOTE — TELEPHONE ENCOUNTER
[address] 5 hours ago (3:05 PM)     BA     Our mutual patient is scheduled for procedure:      Procedure: colonoscopy       Date: 08/30/2023     With: Dr Hermelindo Villegas office is requesting cardiac clearance for their upcoming procedure.      Physician Approving clearance: ________________________     Any Questions please call us at [phone]     Your reply to this message will be accepted as clearance.      Thank you.

## 2023-08-18 NOTE — TELEPHONE ENCOUNTER
Called patient a couples of times to ask her if she is seen a cardiology or if she is getting test done with cardiology, but she did not  the phone or call back to the office. Cardiology says she is not a patient we one of there provider, she just get a test done every 3 months.

## 2023-08-24 ENCOUNTER — HOSPITAL ENCOUNTER (OUTPATIENT)
Dept: INFUSION CENTER | Facility: CLINIC | Age: 63
Discharge: HOME | End: 2023-08-24
Payer: COMMERCIAL

## 2023-08-24 DIAGNOSIS — C50.911 CARCINOMA OF RIGHT BREAST IN FEMALE, ESTROGEN RECEPTOR POSITIVE, UNSPECIFIED SITE OF BREAST (HCC): ICD-10-CM

## 2023-08-24 DIAGNOSIS — C50.911 HER2-POSITIVE CARCINOMA OF RIGHT BREAST (HCC): Primary | ICD-10-CM

## 2023-08-24 DIAGNOSIS — Z95.828 PORT-A-CATH IN PLACE: ICD-10-CM

## 2023-08-24 DIAGNOSIS — Z17.0 CARCINOMA OF RIGHT BREAST IN FEMALE, ESTROGEN RECEPTOR POSITIVE, UNSPECIFIED SITE OF BREAST (HCC): ICD-10-CM

## 2023-08-24 LAB
ALBUMIN SERPL BCP-MCNC: 4 G/DL (ref 3.5–5)
ALP SERPL-CCNC: 36 U/L (ref 34–104)
ALT SERPL W P-5'-P-CCNC: 20 U/L (ref 7–52)
ANION GAP SERPL CALCULATED.3IONS-SCNC: 7 MMOL/L
AST SERPL W P-5'-P-CCNC: 20 U/L (ref 13–39)
BASOPHILS # BLD AUTO: 0.06 THOUSANDS/ÂΜL (ref 0–0.1)
BASOPHILS NFR BLD AUTO: 1 % (ref 0–1)
BILIRUB SERPL-MCNC: 0.44 MG/DL (ref 0.2–1)
BUN SERPL-MCNC: 18 MG/DL (ref 5–25)
CALCIUM SERPL-MCNC: 9.4 MG/DL (ref 8.4–10.2)
CHLORIDE SERPL-SCNC: 101 MMOL/L (ref 96–108)
CO2 SERPL-SCNC: 29 MMOL/L (ref 21–32)
CREAT SERPL-MCNC: 0.73 MG/DL (ref 0.6–1.3)
EOSINOPHIL # BLD AUTO: 0.27 THOUSAND/ÂΜL (ref 0–0.61)
EOSINOPHIL NFR BLD AUTO: 5 % (ref 0–6)
ERYTHROCYTE [DISTWIDTH] IN BLOOD BY AUTOMATED COUNT: 12.4 % (ref 11.6–15.1)
GFR SERPL CREATININE-BSD FRML MDRD: 88 ML/MIN/1.73SQ M
GLUCOSE SERPL-MCNC: 92 MG/DL (ref 65–140)
HCT VFR BLD AUTO: 41.8 % (ref 34.8–46.1)
HGB BLD-MCNC: 13.2 G/DL (ref 11.5–15.4)
IMM GRANULOCYTES # BLD AUTO: 0.02 THOUSAND/UL (ref 0–0.2)
IMM GRANULOCYTES NFR BLD AUTO: 0 % (ref 0–2)
LYMPHOCYTES # BLD AUTO: 1.39 THOUSANDS/ÂΜL (ref 0.6–4.47)
LYMPHOCYTES NFR BLD AUTO: 23 % (ref 14–44)
MCH RBC QN AUTO: 30.7 PG (ref 26.8–34.3)
MCHC RBC AUTO-ENTMCNC: 31.6 G/DL (ref 31.4–37.4)
MCV RBC AUTO: 97 FL (ref 82–98)
MONOCYTES # BLD AUTO: 0.59 THOUSAND/ÂΜL (ref 0.17–1.22)
MONOCYTES NFR BLD AUTO: 10 % (ref 4–12)
NEUTROPHILS # BLD AUTO: 3.68 THOUSANDS/ÂΜL (ref 1.85–7.62)
NEUTS SEG NFR BLD AUTO: 61 % (ref 43–75)
NRBC BLD AUTO-RTO: 0 /100 WBCS
PLATELET # BLD AUTO: 281 THOUSANDS/UL (ref 149–390)
PMV BLD AUTO: 9 FL (ref 8.9–12.7)
POTASSIUM SERPL-SCNC: 4.2 MMOL/L (ref 3.5–5.3)
PROT SERPL-MCNC: 6.5 G/DL (ref 6.4–8.4)
RBC # BLD AUTO: 4.3 MILLION/UL (ref 3.81–5.12)
SODIUM SERPL-SCNC: 137 MMOL/L (ref 135–147)
WBC # BLD AUTO: 6.01 THOUSAND/UL (ref 4.31–10.16)

## 2023-08-24 PROCEDURE — 80053 COMPREHEN METABOLIC PANEL: CPT

## 2023-08-24 PROCEDURE — 85025 COMPLETE CBC W/AUTO DIFF WBC: CPT

## 2023-08-24 PROCEDURE — 86300 IMMUNOASSAY TUMOR CA 15-3: CPT

## 2023-08-24 NOTE — PROGRESS NOTES
Pt presents for central labs. No new meds or concerns. Per [employer], labs can be obtained today so pt wont have to come and be accessed again before her appointment with Dr. Cande Morel. Port accessed and labs obtained per protocol with excellent blood return, tolerated well. Future appointments discussed. AVS declined.

## 2023-08-24 NOTE — TELEPHONE ENCOUNTER
Does not need cardiac clearance given she is monitored for chemotherapy induced cardiac toxicity and her most recent echocardiogram showed normal EF.

## 2023-08-24 NOTE — TELEPHONE ENCOUNTER
Patient came into office. Explains patient has breast cancer and gets her heart tested every 3 months . Is not under the care as cardiologist and does not have any of the listed problems below in the [address] [facility],[address].

## 2023-08-25 LAB — CANCER AG27-29 SERPL-ACNC: 10.5 U/ML (ref 0–38.6)

## 2023-08-29 RX ORDER — ONDANSETRON 2 MG/ML
4 INJECTION INTRAMUSCULAR; INTRAVENOUS ONCE AS NEEDED
Status: CANCELLED | OUTPATIENT
Start: 2023-08-29

## 2023-08-29 RX ORDER — SODIUM CHLORIDE 9 MG/ML
125 INJECTION, SOLUTION INTRAVENOUS CONTINUOUS
Status: CANCELLED | OUTPATIENT
Start: 2023-08-29

## 2023-08-30 ENCOUNTER — HOSPITAL ENCOUNTER (OUTPATIENT)
Dept: GASTROENTEROLOGY | Facility: MEDICAL CENTER | Age: 63
Setting detail: OUTPATIENT SURGERY
Discharge: HOME | End: 2023-08-30
Payer: COMMERCIAL

## 2023-08-30 ENCOUNTER — ANESTHESIA (OUTPATIENT)
Dept: GASTROENTEROLOGY | Facility: MEDICAL CENTER | Age: 63
End: 2023-08-30

## 2023-08-30 ENCOUNTER — ANESTHESIA EVENT (OUTPATIENT)
Dept: GASTROENTEROLOGY | Facility: MEDICAL CENTER | Age: 63
End: 2023-08-30

## 2023-08-30 VITALS
SYSTOLIC BLOOD PRESSURE: 117 MMHG | HEART RATE: 67 BPM | RESPIRATION RATE: 20 BRPM | TEMPERATURE: 97.3 F | OXYGEN SATURATION: 100 % | DIASTOLIC BLOOD PRESSURE: 58 MMHG

## 2023-08-30 DIAGNOSIS — Z12.11 COLON CANCER SCREENING: ICD-10-CM

## 2023-08-30 PROCEDURE — 88305 TISSUE EXAM BY PATHOLOGIST: CPT | Performed by: PATHOLOGY

## 2023-08-30 PROCEDURE — 45380 COLONOSCOPY AND BIOPSY: CPT | Performed by: STUDENT IN AN ORGANIZED HEALTH CARE EDUCATION/TRAINING PROGRAM

## 2023-08-30 RX ORDER — SODIUM CHLORIDE 9 MG/ML
125 INJECTION, SOLUTION INTRAVENOUS CONTINUOUS
Status: DISCONTINUED | OUTPATIENT
Start: 2023-08-30 | End: 2023-09-03 | Stop reason: HOSPADM

## 2023-08-30 RX ORDER — CETIRIZINE HYDROCHLORIDE 10 MG/1
10 TABLET ORAL DAILY
COMMUNITY

## 2023-08-30 RX ORDER — PROPOFOL 10 MG/ML
INJECTION, EMULSION INTRAVENOUS AS NEEDED
Status: DISCONTINUED | OUTPATIENT
Start: 2023-08-30 | End: 2023-08-30

## 2023-08-30 RX ORDER — PROPOFOL 10 MG/ML
INJECTION, EMULSION INTRAVENOUS CONTINUOUS PRN
Status: DISCONTINUED | OUTPATIENT
Start: 2023-08-30 | End: 2023-08-30

## 2023-08-30 RX ADMIN — PROPOFOL 80 MCG/KG/MIN: 10 INJECTION, EMULSION INTRAVENOUS at 08:17

## 2023-08-30 RX ADMIN — SODIUM CHLORIDE 125 ML/HR: 0.9 INJECTION, SOLUTION INTRAVENOUS at 08:03

## 2023-08-30 RX ADMIN — PROPOFOL 80 MG: 10 INJECTION, EMULSION INTRAVENOUS at 08:17

## 2023-08-30 NOTE — ANESTHESIA POSTPROCEDURE EVALUATION
Post-Op Assessment Note    CV Status:  Stable    Pain management: adequate     Mental Status:  Alert and awake   Hydration Status:  Euvolemic   PONV Controlled:  Controlled   Airway Patency:  Patent      Post Op Vitals Reviewed: Yes      Staff: Anesthesiologist         No notable events documented.     BP      Temp     Pulse     Resp      SpO2      /58   Pulse 67   Temp (!) 97.3 °F (36.3 °C) (Temporal)   Resp 20   SpO2 100%

## 2023-08-30 NOTE — H&P
History and Physical - [facility]  Gena Vela 59 y.o. female MRN: [id number]                  HPI: Gena Vela is a 59y.o. year old female who presents for colon cancer screening      REVIEW OF SYSTEMS: Per the HPI, and otherwise unremarkable. Historical Information   Past Medical History:   Diagnosis Date   • Anemia March 28,2022    Due to infusion therapy?    • Asthma 2018 2018 to Jan 2021, i was diagnosed with asthma, jan 2021, i did nit have asthma according to the breathing test the asthma doctor performed, i get allergiy shot every 2 weeks for 2 different types of molds   • Breast cancer ([address])    • Cancer ([address]) 3/29/2022    Diagnosed with breast cancer   • Diarrhea     occasional   • Port-A-Cath in place     R side chest     Past Surgical History:   Procedure Laterality Date   • BREAST BIOPSY Right 03/29/2022    IDC   • BREAST LUMPECTOMY Right 09/30/2022    Procedure: ML BREAST;  Surgeon: Sonia Thurston MD;  [facility];  Service: Surgical Oncology   • CATARACT EXTRACTION Left 2019    with lens implant   • IR PORT PLACEMENT  04/20/2022   • LYMPH NODE BIOPSY Right 09/30/2022    Procedure: SLN BX;  Surgeon: Sonia Thurston MD;  [facility];  Service: Surgical Oncology   • MANDIBLE SURGERY      jaw surgery for crooked jaw 1993, 1998   • ROTATOR CUFF REPAIR Right 2020   • US BREAST ML  NEEDLE LOC RIGHT Right 03/29/2022   • US GUIDED BREAST BIOPSY RIGHT COMPLETE Right 03/29/2022     Social History   Social History     Substance and Sexual Activity   Alcohol Use Not Currently     Social History     Substance and Sexual Activity   Drug Use Never     Social History     Tobacco Use   Smoking Status Never   • Passive exposure: Never   Smokeless Tobacco Never     Family History   Problem Relation Age of Onset   • Prostate cancer Father    • Asthma Father         He has had asthma previously   • No Known Problems Sister    • Asthma Brother         Has had asthma previously   • No Known Problems Daughter    • No Known Problems Daughter    • Lung cancer Maternal Aunt    • No Known Problems Paternal Aunt    • No Known Problems Paternal Aunt    • No Known Problems Maternal Grandmother    • No Known Problems Maternal Grandfather    • Breast cancer Paternal Grandmother         I was told she had breast cancer in her 80’s. • No Known Problems Paternal Grandfather    • Stomach cancer Other        Meds/Allergies       Current Outpatient Medications:   •  albuterol (PROVENTIL HFA,VENTOLIN HFA) 90 mcg/act inhaler  •  CALCIUM PO  •  cetirizine (ZyrTEC) 10 mg tablet  •  Multiple Vitamins-Minerals (multivitamin with minerals) tablet  •  VITAMIN D PO  •  albuterol (ACCUNEB) 1.25 MG/3ML nebulizer solution  •  azithromycin (ZITHROMAX) 250 mg tablet  •  denosumab (Prolia) 60 mg/mL  •  EPINEPHrine (EPIPEN) 0.3 mg/0.3 mL SOAJ  •  letrozole (FEMARA) 2.5 mg tablet  •  lidocaine-prilocaine (EMLA) cream  •  loperamide (IMODIUM) 2 mg capsule  •  predniSONE 20 mg tablet  •  sodium chloride (OCEAN) 0.65 % nasal spray    Current Facility-Administered Medications:   •  sodium chloride 0.9 % infusion, 125 mL/hr, Intravenous, Continuous    Allergies   Allergen Reactions   • Molds & Smuts Hives and Anaphylaxis     Sore throat  Sore throat     • Other Other (See Comments), Anaphylaxis and Hives     Mold/gets sore throat   • Nuts - Food Allergy Hives     BRAZILIAN NUTS           Objective     /69   Pulse 79   Temp (!) 97.3 °F (36.3 °C) (Temporal)   Resp 16   SpO2 99%       PHYSICAL EXAM    Gen: NAD  Head: NCAT  CV: RRR  CHEST: Clear  ABD: soft, NT/ND  EXT: no edema      ASSESSMENT/PLAN:  This is a 59y.o. year old female here for colonoscopy, and she is stable and optimized for her procedure.

## 2023-08-30 NOTE — ANESTHESIA PREPROCEDURE EVALUATION
Procedure:  COLONOSCOPY    Relevant Problems   ANESTHESIA (within normal limits)      CARDIO (within normal limits)      GYN   (+) Carcinoma of right breast in female, estrogen receptor positive (HCC)   (+) Malignant neoplasm of central portion of right breast in female, estrogen receptor positive (HCC)      MUSCULOSKELETAL (within normal limits)      NEURO/PSYCH (within normal limits)      PULMONARY (within normal limits)      Musculoskeletal and Integument   (+) Osteoporosis due to aromatase inhibitor      Other   (+) Chemotherapy-induced nausea   (+) Port-A-Cath in place   (+) S/P chemotherapy, time since less than 4 weeks        Physical Exam    Airway    Mallampati score: II         Dental   No notable dental hx     Cardiovascular  Rhythm: regular, Rate: normal, Cardiovascular exam normal    Pulmonary  Pulmonary exam normal Breath sounds clear to auscultation,     Other Findings        Anesthesia Plan  ASA Score- 2     Anesthesia Type- IV sedation with anesthesia with ASA Monitors. Additional Monitors:   Airway Plan:           Plan Factors-    Chart reviewed. Existing labs reviewed. Patient summary reviewed. Patient is not a current smoker. Induction- intravenous. Postoperative Plan-     Informed Consent- Anesthetic plan and risks discussed with patient.

## 2023-09-14 ENCOUNTER — TELEPHONE (OUTPATIENT)
Dept: HEMATOLOGY ONCOLOGY | Facility: CLINIC | Age: 63
End: 2023-09-14

## 2023-09-18 ENCOUNTER — TELEPHONE (OUTPATIENT)
Age: 63
End: 2023-09-18

## 2023-09-20 ENCOUNTER — OFFICE VISIT (OUTPATIENT)
Dept: HEMATOLOGY ONCOLOGY | Facility: CLINIC | Age: 63
End: 2023-09-20
Payer: COMMERCIAL

## 2023-09-20 VITALS
HEIGHT: 66 IN | BODY MASS INDEX: 20.73 KG/M2 | HEART RATE: 70 BPM | OXYGEN SATURATION: 97 % | RESPIRATION RATE: 18 BRPM | SYSTOLIC BLOOD PRESSURE: 124 MMHG | TEMPERATURE: 97.2 F | WEIGHT: 129 LBS | DIASTOLIC BLOOD PRESSURE: 72 MMHG

## 2023-09-20 DIAGNOSIS — C77.3 MALIGNANT NEOPLASM METASTATIC TO LYMPH NODE OF AXILLA (HCC): ICD-10-CM

## 2023-09-20 DIAGNOSIS — C50.111 MALIGNANT NEOPLASM OF CENTRAL PORTION OF RIGHT BREAST IN FEMALE, ESTROGEN RECEPTOR POSITIVE: Primary | ICD-10-CM

## 2023-09-20 DIAGNOSIS — C50.911 CARCINOMA OF RIGHT BREAST IN FEMALE, ESTROGEN RECEPTOR POSITIVE, UNSPECIFIED SITE OF BREAST: ICD-10-CM

## 2023-09-20 DIAGNOSIS — T38.6X5A OSTEOPOROSIS DUE TO AROMATASE INHIBITOR: ICD-10-CM

## 2023-09-20 DIAGNOSIS — Z17.0 MALIGNANT NEOPLASM OF CENTRAL PORTION OF RIGHT BREAST IN FEMALE, ESTROGEN RECEPTOR POSITIVE: Primary | ICD-10-CM

## 2023-09-20 DIAGNOSIS — Z17.0 CARCINOMA OF RIGHT BREAST IN FEMALE, ESTROGEN RECEPTOR POSITIVE, UNSPECIFIED SITE OF BREAST: ICD-10-CM

## 2023-09-20 DIAGNOSIS — I42.7 CHEMOTHERAPY INDUCED CARDIOMYOPATHY: ICD-10-CM

## 2023-09-20 DIAGNOSIS — T45.1X5A CHEMOTHERAPY INDUCED CARDIOMYOPATHY: ICD-10-CM

## 2023-09-20 DIAGNOSIS — Z95.828 PORT-A-CATH IN PLACE: ICD-10-CM

## 2023-09-20 DIAGNOSIS — C50.911 HER2-POSITIVE CARCINOMA OF RIGHT BREAST (HCC): ICD-10-CM

## 2023-09-20 DIAGNOSIS — M81.8 OSTEOPOROSIS DUE TO AROMATASE INHIBITOR: ICD-10-CM

## 2023-09-20 PROCEDURE — 99214 OFFICE O/P EST MOD 30 MIN: CPT | Performed by: INTERNAL MEDICINE

## 2023-09-20 RX ORDER — LIDOCAINE AND PRILOCAINE 25; 25 MG/G; MG/G
CREAM TOPICAL
Qty: 30 G | Refills: 1 | Status: SHIPPED | OUTPATIENT
Start: 2023-09-20

## 2023-09-20 RX ORDER — CETIRIZINE HYDROCHLORIDE 10 MG/1
10 TABLET ORAL DAILY
COMMUNITY

## 2023-09-20 NOTE — PATIENT INSTRUCTIONS
Blood work every 3 months and she could have that from Port-A-Cath. Port flush every 6 weeks. No change in letrozole and Prolia. Echo cardiogram and in January 2024. Renewed Emla cream.  Follow-up in 3 months.

## 2023-09-20 NOTE — PROGRESS NOTES
HPI:  Patient is here with her . In March 2022 patient was diagnosed with   T2 NX triple positive right breast cancer. Tumor mass was 2.5 cm  with negative axilla on ultrasound and negative distant metastatic disease. Path  report was invasive mammary carcinoma, grade 2, ER 70-80% and MD 70-80% and HER2 3+ positive. Patient received neoadjuvant chemo immunotherapy and she received 6 cycles of TCHP and after that she had lumpectomy and sentinel lymph node sampling  on 09/30/2022 and there was residual 1.8 cm invasive cancer. Margins were clear. All  8 sentinel lymph nodes were negative for metastatic disease. Patient received radiation. Patient had Herceptin plus Perjeta to complete 1 year of therapy and that was completed in April 2023. Letrozole was started in November 2022 and she will take that for a total of 10 years. She has osteopenia and she is taking calcium and vitamin D and is  on Prolia. Negative genetic testing for significant variant. No history of cancers in her family. Patient is less nervous and anxious now than before. Has less tiredness. ROS:   Reviewed 12 systems: See symptoms in HPI  Presently no other neurological, cardiac, pulmonary, GI and  symptoms other than listed in HPI. Other symptoms are in HPI. No other symptoms like fever, chills, bleeding, bone pains, skin rash, weight loss, night sweats, arthritic symptoms,   weakness, numbness, claudication and gait problem. No frequent infections. Not unusually sensitive to heat or cold. No swelling of the ankles. No swollen glands. Patient is less anxious.       Physical Exam:   9/20/23 8/30/23 7/31/23   Blood Pressure 124/72 117/58 120/85   Pulse 70 67 79   Respirations 18 20 18   Temperature 97.2 °F (36.2 °C) Abnormal  97.3 °F (36.3 °C) Abnormal  97.4 °F (36.3 °C) Abnormal    Temp Source Tympanic Temporal --   SpO2 97 % 100 % --   Weight - Scale 58.5 kg (129 lb) -- --   Height 5' 5.51" (1.664 m)       Patient is alert and oriented. Patient not in distress. Vitals are above. There is no icterus. No oral thrush. There is no palpable neck mass. Clear lung fields. Regular heart rate. Soft and nontender abdomen. There is no palpable abdominal mass. There is no ascites. There is no edema of the ankles. There is no calf tenderness. There is no focal neurological deficit. There is no   skin rash, there is no palpable lymphadenopathy in te neck and axillary areas,  no clubbing. Patient is anxious. Performance status 1. No lymphedema. Historical Information   Past Medical History:   Diagnosis Date   • Anemia March 28,2022    Due to infusion therapy?    • Asthma 2018 2018 to Jan 2021, i was diagnosed with asthma, jan 2021, i did nit have asthma according to the breathing test the asthma doctor performed, i get allergiy shot every 2 weeks for 2 different types of molds   • Breast cancer ([address])    • Cancer ([address]) 3/29/2022    Diagnosed with breast cancer   • Diarrhea     occasional   • Port-A-Cath in place     R side chest     Past Surgical History:   Procedure Laterality Date   • BREAST BIOPSY Right 03/29/2022    [facility]   • BREAST LUMPECTOMY Right 09/30/2022    Procedure: ML BREAST;  Surgeon: Elizabeth Pena MD;  [facility];  Service: Surgical Oncology   • CATARACT EXTRACTION Left 2019    with lens implant   • IR PORT PLACEMENT  04/20/2022   • LYMPH NODE BIOPSY Right 09/30/2022    Procedure: SLN BX;  Surgeon: Elizabeth Pena MD;  [facility];  Service: Surgical Oncology   • MANDIBLE SURGERY      jaw surgery for crooked jaw 1993, 1998   • ROTATOR CUFF REPAIR Right 2020   • US BREAST ML  NEEDLE LOC RIGHT Right 03/29/2022   • US GUIDED BREAST BIOPSY RIGHT COMPLETE Right 03/29/2022     Social History   Social History     Substance and Sexual Activity   Alcohol Use Not Currently     Social History     Substance and Sexual Activity   Drug Use Never     Social History     Tobacco Use   Smoking Status Never   • Passive exposure: Never   Smokeless Tobacco Never     Family History:   Family History   Problem Relation Age of Onset   • Prostate cancer Father    • Asthma Father         He has had asthma previously   • No Known Problems Sister    • Asthma Brother         Has had asthma previously   • No Known Problems Daughter    • No Known Problems Daughter    • Lung cancer Maternal Aunt    • No Known Problems Paternal Aunt    • No Known Problems Paternal Aunt    • No Known Problems Maternal Grandmother    • No Known Problems Maternal Grandfather    • Breast cancer Paternal Grandmother         I was told she had breast cancer in her 80’s.    • No Known Problems Paternal Grandfather    • Stomach cancer Other          Current Outpatient Medications:   •  albuterol (PROVENTIL HFA,VENTOLIN HFA) 90 mcg/act inhaler, , Disp: , Rfl:   •  CALCIUM PO, Take by mouth, Disp: , Rfl:   •  cetirizine (ZyrTEC) 10 mg tablet, Take 10 mg by mouth daily, Disp: , Rfl:   •  denosumab (Prolia) 60 mg/mL, as directed Subcutaneous, Disp: , Rfl:   •  EPINEPHrine (EPIPEN) 0.3 mg/0.3 mL SOAJ, , Disp: , Rfl:   •  letrozole (FEMARA) 2.5 mg tablet, Take 1 tablet (2.5 mg total) by mouth daily, Disp: 30 tablet, Rfl: 5  •  loperamide (IMODIUM) 2 mg capsule, Take 2 mg by mouth 4 (four) times a day as needed for diarrhea, Disp: , Rfl:   •  Multiple Vitamins-Minerals (multivitamin with minerals) tablet, Take 1 tablet by mouth daily, Disp: , Rfl:   •  sodium chloride (OCEAN) 0.65 % nasal spray, Saline Nasal Mist, Disp: , Rfl:   •  VITAMIN D PO, Take by mouth, Disp: , Rfl:   •  albuterol (ACCUNEB) 1.25 MG/3ML nebulizer solution, Take 1.25 mg by nebulization every 6 (six) hours as needed for wheezing (Patient not taking: Reported on 7/6/2023), Disp: , Rfl:   •  cetirizine (ZyrTEC) 10 mg tablet, Take 10 mg by mouth daily, Disp: , Rfl:   •  lidocaine-prilocaine (EMLA) cream, Apply topically on the skin over the Port-A-Cath as advised (Patient not taking: Reported on 7/6/2023), Disp: 30 g, Rfl: 1    Allergies   Allergen Reactions   • Molds & Smuts Hives and Anaphylaxis     Sore throat  Sore throat     • Other Other (See Comments), Anaphylaxis and Hives     Mold/gets sore throat   • Nuts - Food Allergy Hives     BRAZILIAN NUTS                 Lab Results: I have reviewed all pertinent labs. LABS:    Results for orders placed or performed during the hospital encounter of 08/30/23   Tissue Exam   Result Value Ref Range    Case Report       Surgical Pathology Report                         Case: [id number]                                   Authorizing Provider:  Líus Cassidy MD          Collected:           08/30/2023 0834              Ordering Location:     [facility]        Received:            08/30/2023 [address] [facility]                                                     Pathologist:           Dariusz Reddy MD                                                            Specimens:   A) - Rectum, polyp, BX                                                                              B) - Colon, random colon BX R/O microscopic colitis                                        Final Diagnosis       A. Rectum, polyp, BX:  Hyperplastic polyp     B. Colon, random colon BX R/O microscopic colitis:  Polypoid fragments of colon mucosa with lymphoid aggregate  No evidence of active or microscopic colitis, dysplasia or malignancy         Note       Interpretation performed at [address] [place]        Additional Information       All reported additional testing was performed with appropriately reactive controls.   These tests were developed and their performance characteristics determined by [facility] or appropriate performing facility, though some tests may be performed on tissues which have not been validated for performance characteristics (such as staining performed on alcohol exposed cell blocks and decalcified tissues). Results should be interpreted with caution and in the context of the patients’ clinical condition. These tests may not be cleared or approved by the U.S. Food and Drug Administration, though the FDA has determined that such clearance or approval is not necessary. These tests are used for clinical purposes and they should not be regarded as investigational or for research. This laboratory has been approved by [place] [address], designated as a high-complexity laboratory and is qualified to perform these tests. .      Synoptic Checklist          COLON/RECTUM POLYP FORM - GI - All Specimens          :    Other       :    HP      Gross Description       A. The specimen is received in formalin, labeled with the patient's name and hospital number, and is designated " rectum polyp biopsy". The specimen consists of a single tan friable soft tissue fragment measuring 0.4 cm in greatest dimension. Entirely submitted. Screened cassette. B. The specimen is received in formalin, labeled with the patient's name and hospital number, and is designated " random colon biopsy rule out microscopic colitis". The specimen consists of 3 colorless to tan friable soft tissue fragments ranging from 0.3 to 0.4 cm in greatest dimension. Entirely submitted. Screened cassette. Note: The estimated total formalin fixation time based upon information provided by the submitting clinician and the standard processing schedule is under 72 hours. SSarginson      Clinical Information       · 2 mm sessile polyp in the rectum; performed complete en bloc removal by cold forceps biopsy  · The cecum, ascending colon, transverse colon, descending colon and sigmoid colon appeared normal.  · Performed forceps biopsies in the rectosigmoid to rule out colitis         Imaging Studies: I have personally reviewed pertinent reports. IMPRESSION:     1.   No scintigraphic evidence of osseous metastasis.          Workstation performed: [id number]          Imaging    NM bone scan whole body (Order: [id number]) - 4/22/2022  OSSEOUS STRUCTURES:  No acute fracture or destructive osseous lesion. Old right rib fractures. Degenerative changes, most significant at the right shoulder. L4-L5 anterolisthesis. Lumbar levocurvature. Left transitional lumbosacral segment.     IMPRESSION:     Right breast lesion consistent with known malignancy.     Left adrenal gland thickening, attention on follow-up.     3 mm left upper lobe pulmonary nodule.     Additional findings as above.     The study was marked in EPIC for significant notification.        Workstation performed: [id number]          Imaging    CT chest abdomen pelvis w contrast (Order: [id number]) - 4/21/2022    . Reviewed above test results and discussed with patient and her     Assessment and Plan:  See diagnoses, orders instructions below  Patient is here with her . In March 2022 patient was diagnosed with   T2 NX triple positive right breast cancer. Tumor mass was 2.5 cm  with negative axilla on ultrasound and negative distant metastatic disease. Path  report was invasive mammary carcinoma, grade 2, ER 70-80% and PA 70-80% and HER2 3+ positive. Patient received neoadjuvant chemo immunotherapy and she received 6 cycles of TCHP and after that she had lumpectomy and sentinel lymph node sampling  on 09/30/2022 and there was residual 1.8 cm invasive cancer. Margins were clear. All  8 sentinel lymph nodes were negative for metastatic disease. Patient received radiation. Patient had Herceptin plus Perjeta to complete 1 year of therapy and that was completed in April 2023. Letrozole was started in November 2022 and she will take that for a total of 10 years. She has osteopenia and she is taking calcium and vitamin D and is  on Prolia. Negative genetic testing for significant variant. No history of cancers in her family.    Patient is less nervous and anxious now than before. Has less tiredness. Physical examination and test results are as recorded and discussed in detail. Letrozole will be continued for a total of 10 years. She will stay on  calcium with vitamin D and Prolia. She will continue to have echocardiogram for now, once every 6 months. Discussed all this with patient in detail. Questions answered  Patient  has Port-A-Cath and she gets that flushed  Discussed importance of self-breast examination, eating healthy foods, staying active and health screening test.  Patient is capable of self-care. Discussed precautions against coronavirus and flu and other infections. Goal of therapy will be cure if possible   All discussed in very much detail. Questions answered. 1. Malignant neoplasm of central portion of right breast in female, estrogen receptor positive ([address])    - CBC and differential; Future  - Comprehensive metabolic panel; Future  - Cancer antigen 27.29; Future  - lidocaine-prilocaine (EMLA) cream; Apply topically on the skin over the Port-A-Cath as advised  Dispense: 30 g; Refill: 1    2. HER2-positive carcinoma of right breast (HCC)    - CBC and differential; Future  - Comprehensive metabolic panel; Future  - Cancer antigen 27.29; Future    3. Carcinoma of right breast in female, estrogen receptor positive, unspecified site of breast ([address])      4. 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 Malignant neoplasm metastatic to lymph node of axilla ([address])      7. Osteoporosis due to aromatase inhibitor    Blood work every 3 months and she could have that from Port-A-Cath. Port flush every 6 weeks. No change in letrozole and Prolia. Echo cardiogram  in January 2024.   Renewed Emla cream.  Follow-up in 3 months. Patient will continue to follow with her primary physician and other consultants. Patient voiced understanding and agrees     This note has been generated by voice recognition software. Therefore there maybe spelling, grammar, and other errors. Please contact if questions arise. Counseling / Coordination of Care  . Cristofer Jimenez   Provided counseling and support

## 2023-09-21 ENCOUNTER — TELEPHONE (OUTPATIENT)
Dept: HEMATOLOGY ONCOLOGY | Facility: CLINIC | Age: 63
End: 2023-09-21

## 2023-09-21 NOTE — TELEPHONE ENCOUNTER
Patient Call    Who are you speaking with? Patient    If it is not the patient, are they listed on an active communication consent form? N/A   What is the reason for this call? The patient is calling because "Fadumo" from the office told the patient that she would help set up an echo for the patient. The patient is requesting a call back to help schedule this. Does this require a call back? Yes   If a call back is required, please list best call back number [phone]   If a call back is required, advise that a message will be forwarded to their care team and someone will return their call as soon as possible. Did you relay this information to the patient?  Yes

## 2023-09-25 ENCOUNTER — TELEPHONE (OUTPATIENT)
Dept: PLASTIC SURGERY | Facility: CLINIC | Age: 63
End: 2023-09-25

## 2023-09-25 NOTE — TELEPHONE ENCOUNTER
Returned patient's call and left message to call to discuss appt with Dr. Jostin Wu to discuss surgery. Was seen in April 2023.

## 2023-09-26 ENCOUNTER — TELEPHONE (OUTPATIENT)
Age: 63
End: 2023-09-26

## 2023-09-26 ENCOUNTER — TELEPHONE (OUTPATIENT)
Dept: PLASTIC SURGERY | Facility: CLINIC | Age: 63
End: 2023-09-26

## 2023-09-26 NOTE — TELEPHONE ENCOUNTER
Returned patient's phone call and she had some questions pertaining to surgery. I let her know I would give the information to Dr. Rahat Hugo. Dr. Rahat Hugo said she will call the patient to answer the questions.

## 2023-09-26 NOTE — TELEPHONE ENCOUNTER
Patient called asking to speak with Tara Varela. She missed her call yesterday and has a bunch of questions prior to surgery. I was unable to transfer and advised the patient that I would have Kiesha White call her back.

## 2023-09-29 ENCOUNTER — TELEPHONE (OUTPATIENT)
Dept: PLASTIC SURGERY | Facility: CLINIC | Age: 63
End: 2023-09-29

## 2023-09-29 NOTE — TELEPHONE ENCOUNTER
Patient calling to speak with Zurdo Davidson. She says she was given the option to have her reconstructive surgery and have her chest port taken out all at once. At the time she declined, but she is interested in having this done, now. I advised the patient that I would pass the message along and have shannon reach out to her.

## 2023-09-29 NOTE — TELEPHONE ENCOUNTER
Returned patient's phone call to discuss information she would like passed on to Dr. Eagle Huber. Patient requested phone call from Dr. Eagle Huber. Ant Rhoades know that I would message Dr. Eagle Huber and she will call Jovita Wilburn when back in the office next week.

## 2023-10-02 ENCOUNTER — TELEPHONE (OUTPATIENT)
Dept: OTHER | Facility: OTHER | Age: 63
End: 2023-10-02

## 2023-10-03 NOTE — TELEPHONE ENCOUNTER
Patient called the intake Line to Schedule a New Patient Appointment. Please call patient back after 10:00 am on Tuesday.

## 2023-10-05 ENCOUNTER — HOSPITAL ENCOUNTER (OUTPATIENT)
Dept: INFUSION CENTER | Facility: CLINIC | Age: 63
Discharge: HOME | End: 2023-10-05
Payer: COMMERCIAL

## 2023-10-05 DIAGNOSIS — Z95.828 PORT-A-CATH IN PLACE: Primary | ICD-10-CM

## 2023-10-05 PROCEDURE — 96523 IRRIG DRUG DELIVERY DEVICE: CPT

## 2023-10-05 NOTE — PROGRESS NOTES
Pt without complaint. [facility]  Pt declines AVS today, next appt scheduled for 6 weeks

## 2023-10-06 ENCOUNTER — PREP FOR PROCEDURE (OUTPATIENT)
Dept: PLASTIC SURGERY | Facility: CLINIC | Age: 63
End: 2023-10-06

## 2023-10-06 DIAGNOSIS — Z17.1 MALIGNANT NEOPLASM OF CENTRAL PORTION OF RIGHT BREAST IN FEMALE, ESTROGEN RECEPTOR NEGATIVE: Primary | ICD-10-CM

## 2023-10-06 DIAGNOSIS — C50.111 MALIGNANT NEOPLASM OF CENTRAL PORTION OF RIGHT BREAST IN FEMALE, ESTROGEN RECEPTOR NEGATIVE: Primary | ICD-10-CM

## 2023-10-06 DIAGNOSIS — Z85.3 PERSONAL HISTORY OF BREAST CANCER: ICD-10-CM

## 2023-10-11 DIAGNOSIS — T38.6X5A OSTEOPOROSIS DUE TO AROMATASE INHIBITOR: Primary | ICD-10-CM

## 2023-10-11 DIAGNOSIS — M81.8 OSTEOPOROSIS DUE TO AROMATASE INHIBITOR: Primary | ICD-10-CM

## 2023-10-25 ENCOUNTER — TELEPHONE (OUTPATIENT)
Dept: HEMATOLOGY ONCOLOGY | Facility: CLINIC | Age: 63
End: 2023-10-25

## 2023-10-25 NOTE — TELEPHONE ENCOUNTER
Appointment Change  Cancel, Reschedule, Change to Virtual      Who are you speaking with? Patient   If it is not the patient, is the caller listed on the communication consent form? Yes   Which provider is the appointment scheduled with? Dr. Milka Rosenthal   When was the original appointment scheduled? Please list date and time 12/13    At which location is the appointment scheduled to take place? [place]   Was the appointment rescheduled? Was the appointment changed from an in person visit to a virtual visit? If so, please list the details of the change. 12/29 11:20am [facility]   What is the reason for the appointment change? Provider away       Was STAR transport scheduled? No   Does STAR transport need to be scheduled for the new visit (if applicable) No   Does the patient need an infusion appointment rescheduled? No   Does the patient have an upcoming infusion appointment scheduled? If so, when? Yes, 11/16   Is the patient undergoing chemotherapy? No   For appointments cancelled with less than 24 hours:  Was the no-show policy reviewed?  Yes

## 2023-10-25 NOTE — TELEPHONE ENCOUNTER
I called [facility] regarding an appointment that they have scheduled with Dr. Scarlett Saint scheduled on  12/13/23      Patient explained that she will call back to reschedule.  Another attempt will be made to reschedule

## 2023-10-26 ENCOUNTER — TELEPHONE (OUTPATIENT)
Dept: HEMATOLOGY ONCOLOGY | Facility: CLINIC | Age: 63
End: 2023-10-26

## 2023-10-26 NOTE — TELEPHONE ENCOUNTER
Appointment Change  Cancel, Reschedule, Change to Virtual      Who are you speaking with? Patient   If it is not the patient, is the caller listed on the communication consent form? N/A   Which provider is the appointment scheduled with? Dr. Linda Nino   When was the original appointment scheduled? Please list date and time 12/29/23 1120   At which location is the appointment scheduled to take place[facility]   Was the appointment rescheduled? Was the appointment changed from an in person visit to a virtual visit? If so, please list the details of the change. 12/1/23 4pm   What is the reason for the appointment change? Sooner appt       Was [facility] transport scheduled? N/A   Does [facility] transport need to be scheduled for the new visit (if applicable) N/A   Does the patient need an infusion appointment rescheduled? N/A   Does the patient have an upcoming infusion appointment scheduled? If so, when? No   Is the patient undergoing chemotherapy? N/A   For appointments cancelled with less than 24 hours:  Was the no-show policy reviewed?  N/A

## 2023-11-16 ENCOUNTER — HOSPITAL ENCOUNTER (OUTPATIENT)
Dept: INFUSION CENTER | Facility: CLINIC | Age: 63
Discharge: HOME | End: 2023-11-16
Payer: COMMERCIAL

## 2023-11-16 DIAGNOSIS — Z95.828 PORT-A-CATH IN PLACE: Primary | ICD-10-CM

## 2023-11-16 DIAGNOSIS — C50.111 MALIGNANT NEOPLASM OF CENTRAL PORTION OF RIGHT BREAST IN FEMALE, ESTROGEN RECEPTOR POSITIVE: ICD-10-CM

## 2023-11-16 DIAGNOSIS — Z17.0 MALIGNANT NEOPLASM OF CENTRAL PORTION OF RIGHT BREAST IN FEMALE, ESTROGEN RECEPTOR POSITIVE: ICD-10-CM

## 2023-11-16 DIAGNOSIS — C50.911 HER2-POSITIVE CARCINOMA OF RIGHT BREAST (HCC): ICD-10-CM

## 2023-11-16 LAB
ALBUMIN SERPL BCP-MCNC: 4.4 G/DL (ref 3.5–5)
ALP SERPL-CCNC: 30 U/L (ref 34–104)
ALT SERPL W P-5'-P-CCNC: 11 U/L (ref 7–52)
ANION GAP SERPL CALCULATED.3IONS-SCNC: 7 MMOL/L
AST SERPL W P-5'-P-CCNC: 17 U/L (ref 13–39)
BASOPHILS # BLD MANUAL: 0.17 THOUSAND/UL (ref 0–0.1)
BASOPHILS NFR MAR MANUAL: 3 % (ref 0–1)
BILIRUB SERPL-MCNC: 0.44 MG/DL (ref 0.2–1)
BUN SERPL-MCNC: 23 MG/DL (ref 5–25)
CALCIUM SERPL-MCNC: 10.1 MG/DL (ref 8.4–10.2)
CHLORIDE SERPL-SCNC: 103 MMOL/L (ref 96–108)
CO2 SERPL-SCNC: 28 MMOL/L (ref 21–32)
CREAT SERPL-MCNC: 0.69 MG/DL (ref 0.6–1.3)
EOSINOPHIL # BLD MANUAL: 0.57 THOUSAND/UL (ref 0–0.4)
EOSINOPHIL NFR BLD MANUAL: 10 % (ref 0–6)
ERYTHROCYTE [DISTWIDTH] IN BLOOD BY AUTOMATED COUNT: 12.1 % (ref 11.6–15.1)
GFR SERPL CREATININE-BSD FRML MDRD: 93 ML/MIN/1.73SQ M
GLUCOSE SERPL-MCNC: 92 MG/DL (ref 65–140)
HCT VFR BLD AUTO: 39.4 % (ref 34.8–46.1)
HGB BLD-MCNC: 12.7 G/DL (ref 11.5–15.4)
LYMPHOCYTES # BLD AUTO: 2 THOUSAND/UL (ref 0.6–4.47)
LYMPHOCYTES # BLD AUTO: 35 % (ref 14–44)
MCH RBC QN AUTO: 30.5 PG (ref 26.8–34.3)
MCHC RBC AUTO-ENTMCNC: 32.2 G/DL (ref 31.4–37.4)
MCV RBC AUTO: 95 FL (ref 82–98)
MONOCYTES # BLD AUTO: 0.4 THOUSAND/UL (ref 0–1.22)
MONOCYTES NFR BLD: 7 % (ref 4–12)
NEUTROPHILS # BLD MANUAL: 2.57 THOUSAND/UL (ref 1.85–7.62)
NEUTS SEG NFR BLD AUTO: 45 % (ref 43–75)
PLATELET # BLD AUTO: 252 THOUSANDS/UL (ref 149–390)
PLATELET BLD QL SMEAR: ADEQUATE
PMV BLD AUTO: 9.5 FL (ref 8.9–12.7)
POTASSIUM SERPL-SCNC: 4 MMOL/L (ref 3.5–5.3)
PROT SERPL-MCNC: 6.6 G/DL (ref 6.4–8.4)
RBC # BLD AUTO: 4.16 MILLION/UL (ref 3.81–5.12)
RBC MORPH BLD: NORMAL
SODIUM SERPL-SCNC: 138 MMOL/L (ref 135–147)
WBC # BLD AUTO: 5.72 THOUSAND/UL (ref 4.31–10.16)

## 2023-11-16 PROCEDURE — 85007 BL SMEAR W/DIFF WBC COUNT: CPT

## 2023-11-16 PROCEDURE — 80053 COMPREHEN METABOLIC PANEL: CPT

## 2023-11-16 PROCEDURE — 85027 COMPLETE CBC AUTOMATED: CPT

## 2023-11-16 PROCEDURE — 86300 IMMUNOASSAY TUMOR CA 15-3: CPT

## 2023-11-17 LAB — CANCER AG27-29 SERPL-ACNC: 12.9 U/ML (ref 0–38.6)

## 2023-11-29 ENCOUNTER — TELEPHONE (OUTPATIENT)
Dept: HEMATOLOGY ONCOLOGY | Facility: CLINIC | Age: 63
End: 2023-11-29

## 2023-11-29 NOTE — TELEPHONE ENCOUNTER
Patient Call    Who are you speaking with? Patient    If it is not the patient, are they listed on an active communication consent form? N/A   What is the reason for this call? Pt asked if bone scan was approved   Does this require a call back? Yes   If a call back is required, please list best call back number [phone]    If a call back is required, advise that a message will be forwarded to their care team and someone will return their call as soon as possible. Did you relay this information to the patient?  Yes

## 2023-12-01 ENCOUNTER — OFFICE VISIT (OUTPATIENT)
Dept: HEMATOLOGY ONCOLOGY | Facility: CLINIC | Age: 63
End: 2023-12-01
Payer: COMMERCIAL

## 2023-12-01 VITALS
BODY MASS INDEX: 21.16 KG/M2 | OXYGEN SATURATION: 98 % | HEART RATE: 89 BPM | HEIGHT: 65 IN | SYSTOLIC BLOOD PRESSURE: 122 MMHG | WEIGHT: 127 LBS | RESPIRATION RATE: 17 BRPM | DIASTOLIC BLOOD PRESSURE: 72 MMHG | TEMPERATURE: 98.3 F

## 2023-12-01 DIAGNOSIS — Z17.0 MALIGNANT NEOPLASM OF CENTRAL PORTION OF RIGHT BREAST IN FEMALE, ESTROGEN RECEPTOR POSITIVE: Primary | ICD-10-CM

## 2023-12-01 DIAGNOSIS — T38.6X5A OSTEOPOROSIS DUE TO AROMATASE INHIBITOR: ICD-10-CM

## 2023-12-01 DIAGNOSIS — C77.3 MALIGNANT NEOPLASM METASTATIC TO LYMPH NODE OF AXILLA (HCC): ICD-10-CM

## 2023-12-01 DIAGNOSIS — C50.911 HER2-POSITIVE CARCINOMA OF RIGHT BREAST (HCC): ICD-10-CM

## 2023-12-01 DIAGNOSIS — R07.81 RIB PAIN: ICD-10-CM

## 2023-12-01 DIAGNOSIS — I42.7 CHEMOTHERAPY INDUCED CARDIOMYOPATHY: ICD-10-CM

## 2023-12-01 DIAGNOSIS — C50.111 MALIGNANT NEOPLASM OF CENTRAL PORTION OF RIGHT BREAST IN FEMALE, ESTROGEN RECEPTOR POSITIVE: Primary | ICD-10-CM

## 2023-12-01 DIAGNOSIS — R07.89 PAIN, CHEST WALL: ICD-10-CM

## 2023-12-01 DIAGNOSIS — T45.1X5A CHEMOTHERAPY INDUCED CARDIOMYOPATHY: ICD-10-CM

## 2023-12-01 DIAGNOSIS — Z95.828 PORT-A-CATH IN PLACE: ICD-10-CM

## 2023-12-01 DIAGNOSIS — M81.8 OSTEOPOROSIS DUE TO AROMATASE INHIBITOR: ICD-10-CM

## 2023-12-01 PROCEDURE — 99214 OFFICE O/P EST MOD 30 MIN: CPT | Performed by: INTERNAL MEDICINE

## 2023-12-01 NOTE — PATIENT INSTRUCTIONS
CT scans and bone scan this month. She would prefer at [address]  and if not [facility]. Blood work prior to next visit in 4 months.   Please change port flush flush to once every 4 weeks

## 2023-12-02 ENCOUNTER — TELEPHONE (OUTPATIENT)
Dept: OTHER | Facility: HOSPITAL | Age: 63
End: 2023-12-02

## 2023-12-03 NOTE — PROGRESS NOTES
HPI:  Patient is here with her . Follow-up visit for triple positive right breast cancer diagnosed in March 2022, T2 NX. Tumor mass was 2.5 cm  with negative axilla on ultrasound and negative distant metastatic disease. Path  report was invasive mammary carcinoma, grade 2, ER 70-80% and DE 70-80% and HER2 3+ positive. Patient received neoadjuvant chemo immunotherapy and she received 6 cycles of TCHP and after that she had lumpectomy and sentinel lymph node sampling  on 09/30/2022 and there was residual 1.8 cm invasive cancer. Margins were clear. All  8 sentinel lymph nodes were negative for metastatic disease. Patient received radiation. Patient had Herceptin plus Perjeta to complete 1 year of therapy and that was completed in April 2023. Letrozole was started in November 2022 and she will take that for a total of 10 years. She has osteopenia and she is taking calcium and vitamin D and is  on Prolia. Negative genetic testing for significant variant. No history of cancers in her family. Patient is less nervous and anxious now than before. Has less tiredness. She has required CT scans and bone scan because occasionally she has pain in the right lower chest anteriorly. She states this pain has been there ever since she had surgery. ROS:   Reviewed 12 systems: See symptoms in HPI  Presently no other neurological, cardiac, pulmonary, GI and  symptoms other than listed in HPI. Other symptoms are in HPI. No other symptoms like fever, chills, bleeding, skin rash, weight loss, night sweats, arthritic symptoms,   weakness, numbness, claudication and gait problem. No frequent infections. Not unusually sensitive to heat or cold. No swelling of the ankles. No swollen glands. Patient is less anxious.       Physical Exam:     12/1/23 9/20/23 8/30/23   Blood Pressure 122/72 124/72 117/58   Pulse 89 70 67   Respirations 17 18 20   Temperature 98.3 °F (36.8 °C) 97.2 °F (36.2 °C) Abnormal  97.3 °F (36.3 °C) Abnormal    Temp Source -- Tympanic Temporal   SpO2 98 % 97 % 100 %   Weight - Scale 57.6 kg (127 lb) 58.5 kg (129 lb) --   Height 5' 5" (1.651 m) 5' 5.51" (1.664 m) --   Pain Score Two -- --   Pain Loc HAND [right hand- only when pressing down] -- --     Alert and oriented and not in distress. Vital signs are above. No icterus. No oral thrush. No palpable neck mass. Clear lung fields. Regular heart rate is regular. There is no tenderness in the area pain in the right lower rib cage. Soft and nontender abdomen. No palpable abdominal mass. No ascites. No edema of the ankles. No calf tenderness. No focal neurological deficit. No skin rash. Lymph nodes are not palpably enlarged in the neck and axillary areas. No clubbing. Patient is anxious. Performance status 1. No lymphedema. Historical Information   Past Medical History:   Diagnosis Date    Anemia March 28,2022    Due to infusion therapy?     Asthma 2018 2018 to Jan 2021, i was diagnosed with asthma, jan 2021, i did nit have asthma according to the breathing test the asthma doctor performed, i get allergiy shot every 2 weeks for 2 different types of molds    Breast cancer ([address])     Cancer ([address]) 3/29/2022    Diagnosed with breast cancer    Diarrhea     occasional    Port-A-Cath in place     R side chest     Past Surgical History:   Procedure Laterality Date    BREAST BIOPSY Right 03/29/2022    [facility]    BREAST LUMPECTOMY Right 09/30/2022    Procedure: ML BREAST;  Surgeon: Francisco Mitchell MD;  Location: [facility];  Service: Surgical Oncology    CATARACT EXTRACTION Left 2019    with lens implant    IR PORT PLACEMENT  04/20/2022    LYMPH NODE BIOPSY Right 09/30/2022    Procedure: SLN BX;  Surgeon: Francisco Mitchell MD;  Location: [facility];  Service: Surgical Oncology    MANDIBLE SURGERY      jaw surgery for crooked jaw [address] Right 2020    US BREAST ML  NEEDLE LOC RIGHT Right 03/29/2022    US GUIDED BREAST BIOPSY RIGHT COMPLETE Right 03/29/2022     Social History   Social History     Substance and Sexual Activity   Alcohol Use Not Currently     Social History     Substance and Sexual Activity   Drug Use Never     Social History     Tobacco Use   Smoking Status Never    Passive exposure: Never   Smokeless Tobacco Never     Family History:   Family History   Problem Relation Age of Onset    Prostate cancer Father     Asthma Father         He has had asthma previously    No Known Problems Sister     Asthma Brother         Has had asthma previously    No Known Problems Daughter     No Known Problems Daughter     Lung cancer Maternal Aunt     No Known Problems Paternal Aunt     No Known Problems Paternal Aunt     No Known Problems Maternal Grandmother     No Known Problems Maternal Grandfather     Breast cancer Paternal Grandmother         I was told she had breast cancer in her 80’s.     No Known Problems Paternal Grandfather     Stomach cancer Other          Current Outpatient Medications:     albuterol (PROVENTIL HFA,VENTOLIN HFA) 90 mcg/act inhaler, , Disp: , Rfl:     CALCIUM PO, Take by mouth, Disp: , Rfl:     cetirizine (ZyrTEC) 10 mg tablet, Take 10 mg by mouth daily, Disp: , Rfl:     cetirizine (ZyrTEC) 10 mg tablet, Take 10 mg by mouth daily, Disp: , Rfl:     denosumab (Prolia) 60 mg/mL, as directed Subcutaneous, Disp: , Rfl:     EPINEPHrine (EPIPEN) 0.3 mg/0.3 mL SOAJ, , Disp: , Rfl:     letrozole (FEMARA) 2.5 mg tablet, Take 1 tablet (2.5 mg total) by mouth daily, Disp: 30 tablet, Rfl: 5    lidocaine-prilocaine (EMLA) cream, Apply topically on the skin over the Port-A-Cath as advised, Disp: 30 g, Rfl: 1    loperamide (IMODIUM) 2 mg capsule, Take 2 mg by mouth 4 (four) times a day as needed for diarrhea, Disp: , Rfl:     Multiple Vitamins-Minerals (multivitamin with minerals) tablet, Take 1 tablet by mouth daily, Disp: , Rfl:     sodium chloride (OCEAN) 0.65 % nasal spray, Saline Nasal Mist, Disp: , Rfl: VITAMIN D PO, Take by mouth, Disp: , Rfl:     albuterol (ACCUNEB) 1.25 MG/3ML nebulizer solution, Take 1.25 mg by nebulization every 6 (six) hours as needed for wheezing (Patient not taking: Reported on 7/6/2023), Disp: , Rfl:     Allergies   Allergen Reactions    Molds & Smuts Hives and Anaphylaxis     Sore throat  Sore throat      Other Other (See Comments), Anaphylaxis and Hives     Mold/gets sore throat    Nuts - Food Allergy Hives     BRAZILIAN NUTS                 Lab Results: I have reviewed all pertinent labs.   LABS:      Results for orders placed or performed during the hospital encounter of 11/16/23   CBC and differential   Result Value Ref Range    WBC 5.72 4.31 - 10.16 Thousand/uL    RBC 4.16 3.81 - 5.12 Million/uL    Hemoglobin 12.7 11.5 - 15.4 g/dL    Hematocrit 39.4 34.8 - 46.1 %    MCV 95 82 - 98 fL    MCH 30.5 26.8 - 34.3 pg    MCHC 32.2 31.4 - 37.4 g/dL    RDW 12.1 11.6 - 15.1 %    MPV 9.5 8.9 - 12.7 fL    Platelets 291 074 - 994 Thousands/uL   Comprehensive metabolic panel   Result Value Ref Range    Sodium 138 135 - 147 mmol/L    Potassium 4.0 3.5 - 5.3 mmol/L    Chloride 103 96 - 108 mmol/L    CO2 28 21 - 32 mmol/L    ANION GAP 7 mmol/L    BUN 23 5 - 25 mg/dL    Creatinine 0.69 0.60 - 1.30 mg/dL    Glucose 92 65 - 140 mg/dL    Calcium 10.1 8.4 - 10.2 mg/dL    AST 17 13 - 39 U/L    ALT 11 7 - 52 U/L    Alkaline Phosphatase 30 (L) 34 - 104 U/L    Total Protein 6.6 6.4 - 8.4 g/dL    Albumin 4.4 3.5 - 5.0 g/dL    Total Bilirubin 0.44 0.20 - 1.00 mg/dL    eGFR 93 ml/min/1.73sq m   Cancer antigen 27.29   Result Value Ref Range    CA 27-29 12.9 0.0 - 38.6 U/mL   Manual Differential(PHLEBS Do Not Order)   Result Value Ref Range    Segmented % 45 43 - 75 %    Lymphocytes % 35 14 - 44 %    Monocytes % 7 4 - 12 %    Eosinophils, % 10 (H) 0 - 6 %    Basophils % 3 (H) 0 - 1 %    Absolute Neutrophils 2.57 1.85 - 7.62 Thousand/uL    Lymphocytes Absolute 2.00 0.60 - 4.47 Thousand/uL    Monocytes Absolute 0.40 0.00 - 1.22 Thousand/uL    Eosinophils Absolute 0.57 (H) 0.00 - 0.40 Thousand/uL    Basophils Absolute 0.17 (H) 0.00 - 0.10 Thousand/uL    Total Counted      RBC Morphology Normal     Platelet Estimate Adequate Adequate         Imaging Studies: I have personally reviewed pertinent reports. IMPRESSION:     1. No scintigraphic evidence of osseous metastasis. Workstation performed: [id number]          Imaging    NM bone scan whole body (Order: [id number]) - 4/22/2022  OSSEOUS STRUCTURES:  No acute fracture or destructive osseous lesion. Old right rib fractures. Degenerative changes, most significant at the right shoulder. L4-L5 anterolisthesis. Lumbar levocurvature. Left transitional lumbosacral segment. IMPRESSION:     Right breast lesion consistent with known malignancy. Left adrenal gland thickening, attention on follow-up. 3 mm left upper lobe pulmonary nodule. Additional findings as above. The study was marked in EPIC for significant notification. Workstation performed: [id number]          Imaging    CT chest abdomen pelvis w contrast (Order: [id number]) - 4/21/2022    . Reviewed above test results and discussed with patient and her     Assessment and Plan:  See diagnoses, orders instructions below  Patient is here with her . Follow-up visit for triple positive right breast cancer diagnosed in March 2022, T2 NX. Tumor mass was 2.5 cm  with negative axilla on ultrasound and negative distant metastatic disease. Path  report was invasive mammary carcinoma, grade 2, ER 70-80% and MO 70-80% and HER2 3+ positive. Patient received neoadjuvant chemo immunotherapy and she received 6 cycles of TCHP and after that she had lumpectomy and sentinel lymph node sampling  on 09/30/2022 and there was residual 1.8 cm invasive cancer. Margins were clear. All  8 sentinel lymph nodes were negative for metastatic disease. Patient received radiation.   Patient had Herceptin plus Perjeta to complete 1 year of therapy and that was completed in April 2023. Letrozole was started in November 2022 and she will take that for a total of 10 years. She has osteopenia and she is taking calcium and vitamin D and is  on Prolia. Negative genetic testing for significant variant. No history of cancers in her family. Patient is less nervous and anxious now than before. Has less tiredness. She has required CT scans and bone scan because occasionally she has pain in the right lower chest anteriorly. She states this pain has been there ever since she had surgery. Physical examination and test results are as recorded and discussed in detail. Letrozole will be continued for a total of 10 years. She will stay on  calcium with vitamin D and Prolia. She will continue to have echocardiogram for now, once every 6 months. Ordered CT scan and bone scan for right lower anterior chest wall pain. Discussed all this with patient in detail. Questions answered  Patient  has Port-A-Cath and she gets that flushed. Discussed that single drug Kadcyla in the adjuvant setting was found to be better than Herceptin but not tested against Herceptin plus Perjeta and I do not think that will be tested. Discussed importance of self-breast examination, eating healthy foods, staying active and health screening test.  Patient is capable of self-care. Discussed precautions against coronavirus and flu and other infections. Goal of therapy will be cure if possible   All discussed in very much detail. Questions answered. 1. Malignant neoplasm of central portion of right breast in female, estrogen receptor positive     - CBC and differential; Future  - Comprehensive metabolic panel; Future  - Cancer antigen 27.29; Future  - CT chest abdomen pelvis w contrast; Future  - NM bone scan whole body; Future    2.  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 HER2-positive carcinoma of right breast (HCC)    - CBC and differential; Future  - Comprehensive metabolic panel; Future  - Cancer antigen 27.29; Future  - CT chest abdomen pelvis w contrast; Future  - NM bone scan whole body; Future    4. Port-A-Cath in place      5. Osteoporosis due to aromatase inhibitor      6. Chemotherapy induced cardiomyopathy       7. Pain, chest wall    - CT chest abdomen pelvis w contrast; Future  - NM bone scan whole body; Future    8. Rib pain    - CT chest abdomen pelvis w contrast; Future  - NM bone scan whole body; Future    CT scans and bone scan this month. She would prefer at [facility] and if not [address] [place] [address]. Blood work prior to next visit in 4 months. Please change port flush flush to once every 4 weeks. Patient mentioned that nurses were having some problem drawing blood from port but it flushed okay. Will try every 4 weeks. If problem worsened she will be referred to IR. Patient states she will be going for reconstruction in January and her breast plastic surgeon will remove the port. Patient will continue to follow with her primary physician and other consultants. Patient voiced understanding and agrees     This note has been generated by voice recognition software. Therefore there maybe spelling, grammar, and other errors. Please contact if questions arise. Counseling / Coordination of Care  . Brian Chaves   Provided counseling and support

## 2023-12-03 NOTE — TELEPHONE ENCOUNTER
Per patient's phone call, she was under the impression that she was to have a CT scan every 2 years. However, she is scheduled for one on 12/6/23 only 6 months after her last one. She is requesting a call back to discuss. ..

## 2023-12-04 ENCOUNTER — TELEPHONE (OUTPATIENT)
Dept: HEMATOLOGY ONCOLOGY | Facility: CLINIC | Age: 63
End: 2023-12-04

## 2023-12-04 NOTE — TELEPHONE ENCOUNTER
Spoke with patient. She wanted to make sure we knew she had CT scan in June; reviewed that we are aware. Scans were ordered due to her complaints of pain. She states she is going to go as scheduled.

## 2023-12-04 NOTE — TELEPHONE ENCOUNTER
Call Transfer   Who are you speaking with? Patient   If it is not the patient, are they listed on an active communication consent form? N/A   Who is the patients HemOnc/SurgOnc provider? Dr. Marin Reeves   What is the reason for this call? Patient requesting to speak to Dr. Thu Woods team about her CT scan. Person/Department that the call was transferred to? Time that call was transferred? [place] [place] @9:24AM    Your call will be transferred now. If you receive a voicemail, please leave a detailed message and a member of the team will return your call as soon as possible. Did you relay this information to the caller?   Yes

## 2023-12-06 ENCOUNTER — HOSPITAL ENCOUNTER (OUTPATIENT)
Dept: CT IMAGING | Facility: HOSPITAL | Age: 63
Discharge: HOME | End: 2023-12-06
Payer: COMMERCIAL

## 2023-12-06 DIAGNOSIS — Z17.0 MALIGNANT NEOPLASM OF CENTRAL PORTION OF RIGHT BREAST IN FEMALE, ESTROGEN RECEPTOR POSITIVE: ICD-10-CM

## 2023-12-06 DIAGNOSIS — R07.81 RIB PAIN: ICD-10-CM

## 2023-12-06 DIAGNOSIS — C77.3 MALIGNANT NEOPLASM METASTATIC TO LYMPH NODE OF AXILLA (HCC): ICD-10-CM

## 2023-12-06 DIAGNOSIS — C50.911 HER2-POSITIVE CARCINOMA OF RIGHT BREAST (HCC): ICD-10-CM

## 2023-12-06 DIAGNOSIS — C50.111 MALIGNANT NEOPLASM OF CENTRAL PORTION OF RIGHT BREAST IN FEMALE, ESTROGEN RECEPTOR POSITIVE: ICD-10-CM

## 2023-12-06 DIAGNOSIS — R07.89 PAIN, CHEST WALL: ICD-10-CM

## 2023-12-06 PROCEDURE — 74177 CT ABD & PELVIS W/CONTRAST: CPT

## 2023-12-06 PROCEDURE — G1004 CDSM NDSC: HCPCS

## 2023-12-06 PROCEDURE — 71260 CT THORAX DX C+: CPT

## 2023-12-06 RX ADMIN — IOHEXOL 100 ML: 350 INJECTION, SOLUTION INTRAVENOUS at 13:51

## 2023-12-12 ENCOUNTER — TELEPHONE (OUTPATIENT)
Dept: HEMATOLOGY ONCOLOGY | Facility: CLINIC | Age: 63
End: 2023-12-12

## 2023-12-12 NOTE — TELEPHONE ENCOUNTER
Patient Call    Who are you speaking with? Patient    If it is not the patient, are they listed on an active communication consent form? N/A   What is the reason for this call? Patient calling to speak with Dr. Ofe Coronado. She is on medication for covid until Friday and she wants to know if she should still have test done 12/21/23   Does this require a call back? yes   If a call back is required, please list best call back number [phone]   If a call back is required, advise that a message will be forwarded to their care team and someone will return their call as soon as possible. Did you relay this information to the patient?  Yes

## 2023-12-13 ENCOUNTER — TELEPHONE (OUTPATIENT)
Dept: PLASTIC SURGERY | Facility: CLINIC | Age: 63
End: 2023-12-13

## 2023-12-13 NOTE — LETTER
Mary Box    Patient Name: Lanny Parson  Surgery Date: 03/13/2024  -The hospital will contact you the night before (usually between 3-7p.m.) with your arrival time to the hospital as well as instructions for surgery.  -They will give you medication instructions as to what you can take the night before and the morning of surgery. You can always check with your prescribing physician as well if any medications are in question. Surgical check list (if applicable)    Pre-Admission Testing - Location: Preferably a [facility] lab or hospital.    Please have labs done 2 weeks prior to surgery. -CBC  -BMP  -EKG      Surgery is scheduled at:    [facility]  [address] [facility]  [address] [facility], [address]      Please contact Jennifer Levy, Surgery Coordinator at phone number: [phone]        [address] [place] [address] for choosing [facility] for your surgical procedure. We want to make your visit to our hospital as comfortable and convenient as possible. Our expert surgical team will provide you with the best care and recovery. If you wish to discuss questions or concerns surrounding your surgical decision, please feel free to contact your surgeon directly and/or the [address] [place] [address] at [phone]. Please read the following instructions to prepare for your surgical procedure. Pre-Admission Testing  After your surgeon schedules your surgery, a nurse from the [address] [place] [address]) will review your medical information. During your telephone call appointment, we will interview you to obtain detailed information about your medical and surgical history.        Please write down the following information and have it available for your interview  with the nurse:  A list of all medicines you take including:  Prescription  Non-prescription  Vitamins, herbs, supplements  Inhalers  Eye drops  Ointments    Also write down the dose, frequency, and time that you take the medicines. A list of all your known allergies and/or reactions you have had to any medicines  A list of all past health problems and operations  A list of any implanted devices (eg. pacemaker) or medical equipment (eg. insulin pump)  A list of any questions or concerns you may have about your surgery  The phone number where you can be reached the day BEFORE your surgery      In an effort to make sure you have the best experience, we will ask you about information that may already be in your record. This is to be sure everything is still accurate and up to date. Day Before Surgery  On the day prior to your surgery, you will receive a telephone call from our staff to confirm your arrival time. If your surgery is scheduled on a Monday, the hospital will be calling you on the Friday before your surgery. If you haven’t heard from anyone by 8 pm, please call the hospital supervisor through the hospital  at [phone]. [place]: [phone])  What do I need to do before coming to the hospital?  Arrange for a responsible person to drive you to and from the hospital.  Arrange for your children to be taken care of at home. Children are not permitted in the surgical recovery areas of the hospital.  Plan to wear casual clothing that is easy to take on and off. We recommend that you stop smoking four weeks prior to your surgery date. Try not to smoke at least 24 hours prior to your surgery. Do not drink alcohol in the 24 hours prior to your surgery. Do not eat or drink anything after midnight the night before your surgery, including candy, mints, life-savers or chewing gum. Follow specific instructions provided by your surgeon or anesthesiologist related to liquids you may drink or be required to drink before your surgery. Follow specific instructions you received from your nurse, surgeon or anesthesia provider regarding which medicine you may take on the day of surgery.    If instructed to take medicine on the morning of surgery, take pills with just a small sip of water. Remove nail polish, including gel polish, and any artificial, gel, or acrylic nails, if possible. Remove all jewelry including rings and any body piercing jewelry. Do not shave the surgical area 24 hours before surgery. Do not apply any lotions/creams, including makeup, colognes, deodorant or perfumes to your skin after showing on the day of your surgery. If instructed to complete a bowel prep/cleanse, follow the specific instructions you received from your surgeon. INSTRUCTIONS FOR BREAST OR BOTTLE-FEEDING PATIENTS  STOP Solid Foods:  After midnight the night before surgery  STOP Formula:  6 hours prior to surgery  STOP Breast Milk:  4 hours prior to surgery  May give clear liquids (Pedialyte, apple juice or water) 2 hours prior to surgery    What do I need to bring with me the day of my surgery? Photo ID and insurance card  Prescription drug card  Crutches or walker if you have them for foot, knee, or hip surgery  Any splints or immobilizers for hand arm or shoulder surgery  A list of daily medicines, vitamins, minerals, herbs and supplements you take including the dosages and the time you take them each day  Glasses, dentures or hearing aids  Minimal clothing  If you have a living will or power of , bring a copy  If you have an ostomy, bring an extra pouch and any supplies you use  If you have an implanted pacemaker or AICD, bring the card or contact information  If you are staying overnight and require a C-PAP, bring your C-PAP machine  If you have an implanted stimulator - bring the remote control and your card or contact information  Did you complete your pre-op bathing? DO NOT BRING MEDICINES, MONEY, VALUABLES, ELECTRONICS or JEWELRY      What other information should I have about the day of surgery?   You will be provided with directions on where to report during your arrival time phone call.  Radha Doranre should notify your surgeon if you develop a cold, sore throat, cough, fever, rash or any other illness. Inform your  that if they do not stay, they will be asked, by the staff, to leave a phone number where they can be reached. We may need to reach you on the day of your surgery. Please make sure the nurse knows what number we may call to reach you. The schedule may change and we may need to ask you to come in earlier or later than expected. WASHING INSTRUCTIONS  Pre-Operative Showering Instructions  Before your operation, you play in important role in decreasing your risk for infection. Washing your body thoroughly in order to reduce bacteria on the skin can help to prevent infections at the surgical site. Please read the following directions THE WEEK BEFORE SURGERY so you are ready! Your surgeon may instruct you to use a special antiseptic soap containing chlorhexidine  gluconate (CHG) prior to surgery. Common brand names include: Aplicare®, Endure, and Hibiclens®. CHG soap is available from some doctor’s offices, [address] [place] [address] or [employer] and the [employer]. If you are allergic or sensitive to chlorhexidine, please let your doctor know so another antiseptic soap can be suggested. If you are unable to purchase soap containing CHG, use regular soap. Notify the nurse on arrival the day of surgery. DAY BEFORE SURGERY  You will need to shower the night before AND the morning of your surgery. You will need to prepare you bed and clothing so they are clean and ready after your showers. Place clean linens (sheets) on your bed; you should sleep on clean sheets after your evening shower. Get clean towels and washcloths ready - you will need enough for 2 showers. Set aside clean underwear, pajamas, and clothing to wear after your showers. EVENING BEFORE SURGERY  The evening before your operation, take your first shower.   Shower 1:  First, shampoo your hair with regular shampoo and rinse it completely before you wash your body. Next, wash your body from head to toe with soap and a clean washcloth. If you were instructed by your surgeon to wash with CHG soap:  Use ½ of the bottle of soap for this shower (you will use the other ½ bottle for your shower in the morning). Do not get CHG in your eyes, ears, nose, mouth, or vaginal area. If you are using regular soap, try to use a new bar if possible  Pay special attention to the area where your incision will be; lather this area well with CHG soap for about 2 minutes. DO NOT use any other soap or body rinse on your skin during or after the antiseptic soap. Rinse yourself completely with running water. Use a clean towel to dry off. Put on clean underwear and clothing/pajamas. Sleep on clean bed sheets/linens. REMINDERS:  DO NOT use lotion, powder, deodorant, or perfume/aftershave of any kind on your skin after our antiseptic shower. DO NOT shave the surgical area in the 24 hours before your operation. DAY OF SURGERY  The morning of your operation, take your second shower. Shower 2:  Follow the same steps as Shower 1. If you were instructed by your surgeon to wash with CHG soap, use the second ½ of the bottle of CHG soap. Anesthesia  At [facility], anesthesia is chosen to fin the patient’s surgical and personal needs. You and your anesthesia provider will discuss an anesthetic plan that uses one or more different types of anesthesia. Plans may include the following:  GENERAL ANESTHESIA - refers to a very deep sleep that prevents you from feeling or remembering any discomfort from the surgery. It is the result of a specific combination of anesthetic agents chosen specifically for you based on your medical history. It is not unusual for patients to require a breathing tube to assist with breathing as well as devices to monitor vital signs.   REGIONAL/LOCAL ANESTHESIA - refers to the injection of local anesthetic into certain parts of the body in order to prevent pain during or after a procedure. Examples of this type of anesthetic include epidurals, spinals and peripheral nerve blocks and/or catheters. Certain peripheral never block catheters may be taken home for post-operative pain control at home. You may or may not also require general anesthesia or monitored anesthesia care. MONITORED ANESTHESIA CARE (MAC) - your anesthesia provider will monitor your vital signs and pain while administering pain medication and sedatives in order to make sure you are comfortable and safe during your procedure. While the depth of sleep associated with MAC is not as deep as with general anesthesia, most patients still do not remember their surgery. MAC may be used along with different types of regional anesthesia. Risks and complications of anesthesia vary according to the type of surgery as well as your medical history. Your anesthesia provider will discuss the risks, complications and side effects as they pertain to your history on the day of surgery. For safety reasons, please follow all pre-operative instructions that you receive from your surgeon or nurse from the [address] [place] [address]. Pain  Recovering from surgery is associated with at least some pain or discomfort. While it is reasonable to expect some pain, we will do our best to reduce your pain and speed your recovery. This may include the use of regional anesthesia including pain devices that patients can continue to use at home, non-opioid pain medicine, opioid pain medicine, and referral to our dedicated inpatient Pain Service. Infection Prevention During Your Surgery  At [facility], our Infection Prevention Program focuses on preventing our patients from developing infections related to their surgery.  Our doctors, nurses, and other healthcare workers follow strict infection prevention guidelines from the Centers for Disease Control and Prevention (CDC). The surgery team will do the following:  Wear a special hair cover, mask, gown, and gloves during surgery to keep the surgery area clean. Clean their hands and arms up to their elbows with a special surgical soap that kills germs just before the surgery. Remove any hair that is present in the area where the incision will be; they will use electric clippers. Keep in mind, only electric clippers will be used for this hair removal!  Clean the skin at the site of your surgery with a special soap that kills germs. By following the best Antimicrobial Stewardship (AMS) practices, we are dedicated to prescribing antibiotics only when they are needed and will avoid giving them to you when they might do more harm than good. If antibiotics are prescribed, be sure to take them as directed by your provider to help prevent complications. In addition to the guidelines above, healthcare workers are expected to clean their hands with soap and water or an alcohol-based hand rub before and after caring for each patient. Before You Are Discharged  You will receive printed discharge instructions regarding your surgery prior to discharge. If there is a pharmacy on campus you can choose to fill your prescriptions before discharge. You can expect a follow-up phone call from a nurse to answer any questions you may have about your surgery and to check on your post operative status. Make sure you know whom to contact if you have questions or problems after you get home. Infection Prevention After Your Surgery  Patients can also play an important role in preventing infections. Ask family and friends to clean their hands before and after visiting you, either with soap and water or with alcohol-based hand . Do not allow visitors to touch the surgical site, wound or dressings. Do not allow your pets to come in contact with your wound.   Make sure you understand how to care for your wound before you leave the medical facility. You should be provided with paperwork at discharge to explain how to care for your wound. Always clean your hands before and after caring for your wound. If you have any symptoms of an infection, such as redness and pain at the surgery site, drainage from your incision, or fever (temperature over 100.5), call your surgeon immediately. [address]:  1200 [facility], 1200 [facility]  For patients having surgery, report to the main hospital entrance and register at the . Surgical Optimization Department: [phone]  Ambulatory Procedure Unit: [phone] [address]:   [address] [facility], 1200 [facility]  3rd Floor  Surgical Optimization Department: [phone]  Ambulatory Surgery: [phone] [address], 65 [facility]  If you are being admitted after having surgery, report to admitting in [address] [facility]. You will then be directed to the Surgical Services Family Waiting Room located on the first floor. Outpatient surgery patients (patients going home that day), report to the Surgical Services Family Waiting Room located on the first floor. Surgical Optimization Department: [phone]  Ambulatory Procedure Unit: [address]  Eye patients, pain management and outpatient GI patients, report to first floor of The [facility]. All other patients (outpatient and same day surgery patients as well as OP Bronchs) report to the [facility] and go directly to the second floor reception desk. Before 5:00 pm, call [phone]  After 5:00 pm, call the Supervisor at [address] [facility], 100 [facility]  [phone]  [place] at main entrance. [place]  [address] [facility], [address] [facility]  [phone]  Report to radiology entrance and register at the desk.    [place]ra  [address], [address] [facility]  Patients please report to [facility] to check in. Surgical Optimization Department: [phone]  Ambulatory Procedure Unit: [phone] [facility]  [address]  All patients report to the registration desk in the main lobby prior to coming to the third floor. Ambulatory Procedure Unit: [phone] or [phone]  After 5:00 pm, [address] [address]  All patients should report to the main hospital entrance and register at the . Surgical Optimization Department: [phone]  Ambulatory Procedure Unit: [phone] 1200 [facility]  [address] [facility], [address] [place] [address]  Patients should report to the main lobby to register. Surgical Optimization Department: [phone]  Ambulatory Procedure Unit: 14[address] [facility] [address], [address]  Please enter through the main entrance and immediately on right will be the registration and check-in area.   Surgical Optimization Department: [phone] or [facility] [facility]  Ambulatory Procedure Unit: [phone]

## 2023-12-13 NOTE — TELEPHONE ENCOUNTER
Returned telephone call left voice message with Pt. Per Brandi FREEDMAN if you are finished with the Covid meds on Friday. It is ok to have the Bone scan. Any questions call me back.

## 2023-12-20 ENCOUNTER — HOSPITAL ENCOUNTER (OUTPATIENT)
Dept: INFUSION CENTER | Facility: CLINIC | Age: 63
Discharge: HOME | End: 2023-12-20
Payer: COMMERCIAL

## 2023-12-20 DIAGNOSIS — Z95.828 PORT-A-CATH IN PLACE: Primary | ICD-10-CM

## 2023-12-20 PROCEDURE — 96523 IRRIG DRUG DELIVERY DEVICE: CPT

## 2023-12-20 NOTE — PROGRESS NOTES
Pt presents for central labs. No new meds or concerns. Port accessed and labs obtained per protocol with excellent blood return, tolerated well. Future appointments discussed, confirmed with pt for 1/18/24 at 1200. AVS declined.

## 2023-12-21 ENCOUNTER — HOSPITAL ENCOUNTER (OUTPATIENT)
Dept: RADIOLOGY | Facility: HOSPITAL | Age: 63
End: 2023-12-21
Payer: COMMERCIAL

## 2023-12-21 DIAGNOSIS — C50.911 HER2-POSITIVE CARCINOMA OF RIGHT BREAST (HCC): ICD-10-CM

## 2023-12-21 DIAGNOSIS — R07.81 RIB PAIN: ICD-10-CM

## 2023-12-21 DIAGNOSIS — C50.111 MALIGNANT NEOPLASM OF CENTRAL PORTION OF RIGHT BREAST IN FEMALE, ESTROGEN RECEPTOR POSITIVE: ICD-10-CM

## 2023-12-21 DIAGNOSIS — R07.89 PAIN, CHEST WALL: ICD-10-CM

## 2023-12-21 DIAGNOSIS — Z17.0 MALIGNANT NEOPLASM OF CENTRAL PORTION OF RIGHT BREAST IN FEMALE, ESTROGEN RECEPTOR POSITIVE: ICD-10-CM

## 2023-12-21 DIAGNOSIS — C77.3 MALIGNANT NEOPLASM METASTATIC TO LYMPH NODE OF AXILLA (HCC): ICD-10-CM

## 2023-12-21 PROCEDURE — G1004 CDSM NDSC: HCPCS

## 2023-12-21 PROCEDURE — A9503 TC99M MEDRONATE: HCPCS

## 2023-12-21 PROCEDURE — 78306 BONE IMAGING WHOLE BODY: CPT

## 2024-01-08 ENCOUNTER — TELEPHONE (OUTPATIENT)
Dept: PLASTIC SURGERY | Facility: CLINIC | Age: 64
End: 2024-01-08

## 2024-01-08 ENCOUNTER — TELEPHONE (OUTPATIENT)
Dept: OTHER | Facility: OTHER | Age: 64
End: 2024-01-08

## 2024-01-08 NOTE — TELEPHONE ENCOUNTER
"Pt stated, \"I received the message from the office's PCC. I will await the office's return call.\" Please call pt when office reopens.  "

## 2024-01-10 ENCOUNTER — OFFICE VISIT (OUTPATIENT)
Dept: SURGICAL ONCOLOGY | Facility: CLINIC | Age: 64
End: 2024-01-10
Payer: COMMERCIAL

## 2024-01-10 VITALS
BODY MASS INDEX: 21.33 KG/M2 | OXYGEN SATURATION: 98 % | HEIGHT: 65 IN | DIASTOLIC BLOOD PRESSURE: 82 MMHG | WEIGHT: 128 LBS | TEMPERATURE: 98.1 F | HEART RATE: 82 BPM | SYSTOLIC BLOOD PRESSURE: 140 MMHG

## 2024-01-10 DIAGNOSIS — Z79.811 USE OF LETROZOLE (FEMARA): ICD-10-CM

## 2024-01-10 DIAGNOSIS — C50.111 MALIGNANT NEOPLASM OF CENTRAL PORTION OF RIGHT BREAST IN FEMALE, ESTROGEN RECEPTOR POSITIVE: Primary | ICD-10-CM

## 2024-01-10 DIAGNOSIS — Z17.0 MALIGNANT NEOPLASM OF CENTRAL PORTION OF RIGHT BREAST IN FEMALE, ESTROGEN RECEPTOR POSITIVE: Primary | ICD-10-CM

## 2024-01-10 PROBLEM — D70.1 CHEMOTHERAPY INDUCED NEUTROPENIA: Status: RESOLVED | Noted: 2022-04-27 | Resolved: 2024-01-10

## 2024-01-10 PROBLEM — Z92.3 HISTORY OF EXTERNAL BEAM RADIATION THERAPY: Status: RESOLVED | Noted: 2024-01-10 | Resolved: 2024-01-10

## 2024-01-10 PROBLEM — T45.1X5A CHEMOTHERAPY INDUCED NEUTROPENIA: Status: RESOLVED | Noted: 2022-04-27 | Resolved: 2024-01-10

## 2024-01-10 PROBLEM — R11.0 CHEMOTHERAPY-INDUCED NAUSEA: Status: RESOLVED | Noted: 2022-04-27 | Resolved: 2024-01-10

## 2024-01-10 PROBLEM — Z92.21 HISTORY OF CANCER CHEMOTHERAPY: Status: RESOLVED | Noted: 2022-08-24 | Resolved: 2024-01-10

## 2024-01-10 PROBLEM — Z92.3 HISTORY OF EXTERNAL BEAM RADIATION THERAPY: Status: ACTIVE | Noted: 2024-01-10

## 2024-01-10 PROBLEM — T45.1X5A CHEMOTHERAPY-INDUCED NAUSEA: Status: RESOLVED | Noted: 2022-04-27 | Resolved: 2024-01-10

## 2024-01-10 PROCEDURE — 99214 OFFICE O/P EST MOD 30 MIN: CPT | Performed by: SURGERY

## 2024-01-10 NOTE — PROGRESS NOTES
Surgical Oncology Follow Up       [address]  [facility] SURGICAL ONCOLOGY [place]  [address]  [facility] [address]    Emily Hernandez  1960  [id number]  [address]  [facility]COL[facility] [place]  [address]  [facility] [address]    Chief Complaint   Patient presents with    Follow-up       Assessment/Plan   Diagnoses and all orders for this visit:    Malignant neoplasm of central portion of right breast in female, estrogen receptor positive   -     Mammo diagnostic bilateral w 3d & cad; Future    Use of letrozole (Femara)        Advance Care Planning/Advance Directives:  Discussed disease status, cancer treatment plans and/or cancer treatment goals with the patient.     Oncology History:    Oncology History   Malignant neoplasm of central portion of right breast in female, estrogen receptor positive    3/29/2022 Biopsy    Right breast biopsy:  - Invasive ductal carcinoma  Grade 2  ER 70%   KY 70%  HER2 positive     4/6/2022 -  Cancer Staged    Staging form: Breast, AJCC 8th Edition  - Clinical stage from 4/6/2022: Stage IB (cT2, cN0, cM0, G2, ER+, KY+, HER2+) - Signed by Azalea Motta MD on 4/6/2022  Stage prefix: Initial diagnosis  Method of lymph node assessment: Clinical  Histologic grading system: 3 grade system       5/4/2022 -  Chemotherapy    Pegfilgrastim-bmez (ZIEXTENZO), 6 mg, Subcutaneous, Once, 6 of 6 cycles  Administration: 6 mg (5/5/2022), 6 mg (5/26/2022), 6 mg (6/16/2022), 6 mg (7/7/2022), 6 mg (7/28/2022), 6 mg (8/18/2022)  fosaprepitant (EMEND) IVPB, 150 mg, Intravenous, Once, 6 of 6 cycles  Administration: 150 mg (5/4/2022), 150 mg (5/25/2022), 150 mg (6/15/2022), 150 mg (7/6/2022), 150 mg (7/27/2022), 150 mg (8/17/2022)  pertuzumab (PERJETA) IVPB, 840 mg, Intravenous, Once, 18 of 18 cycles  Administration: 840 mg (5/4/2022), 420 mg (5/25/2022), 420 mg (9/7/2022), 420 mg (6/15/2022), 420 mg (7/6/2022), 420 mg (7/27/2022), 420 mg  (8/17/2022), 420 mg (9/28/2022), 420 mg (10/19/2022), 420 mg (11/9/2022), 420 mg (11/30/2022), 420 mg (12/21/2022), 420 mg (1/11/2023), 420 mg (2/1/2023), 420 mg (2/22/2023), 420 mg (3/15/2023), 420 mg (4/5/2023), 420 mg (4/26/2023)  CARBOplatin (PARAPLATIN) IVPB (GO AUC DOSING), 588.6 mg, Intravenous, Once, 6 of 6 cycles  Administration: 588.6 mg (5/4/2022), 588.6 mg (5/25/2022), 588.6 mg (6/15/2022), 582.6 mg (7/6/2022), 588.6 mg (7/27/2022), 582.6 mg (8/17/2022)  trastuzumab-qyyp (TRAZIMERA) chemo infusion, 6 mg/kg = 330 mg (100 % of original dose 6 mg/kg), Intravenous, Once, 11 of 11 cycles  Dose modification: 6 mg/kg (original dose 6 mg/kg, Cycle 8), 6 mg/kg (original dose 6 mg/kg, Cycle 9), 6 mg/kg (original dose 6 mg/kg, Cycle 12)  Administration: 330 mg (9/28/2022), 330 mg (10/19/2022), 330 mg (11/9/2022), 361 mg (11/30/2022), 361 mg (12/21/2022), 361 mg (1/11/2023), 361 mg (2/1/2023), 361 mg (2/22/2023), 361 mg (3/15/2023), 361 mg (4/5/2023), 361 mg (4/26/2023)  DOCEtaxel (TAXOTERE) chemo infusion, 75 mg/m2 = 120 mg, Intravenous, Once, 6 of 6 cycles  Administration: 120 mg (5/4/2022), 120 mg (5/25/2022), 120 mg (6/15/2022), 120 mg (7/6/2022), 120 mg (7/27/2022), 120 mg (8/17/2022)  trastuzumab (HERCEPTIN) chemo infusion, 439 mg, Intravenous, Once, 7 of 7 cycles  Administration: 450 mg (5/4/2022), 330 mg (5/25/2022), 330 mg (9/7/2022), 330 mg (6/15/2022), 330 mg (7/6/2022), 330 mg (7/27/2022), 330 mg (8/17/2022)     8/2022 Genetic Testing    [facility] BRCAplus STAT Panel (8 genes): ASHU, BRCA1, BRCA2, CDH1, CHEK2, PALB2, PTEN, TP53 with reflex to [facility] CancerNext + RNA (28 additional genes): APC, AXIN2 BARD1, BRIP1, BMPR1A, CDK4, CDKN2A, DICER1, EPCAM, GREM1, HOXB13, MLH1, MSH2, MSH3, MSH6, MUTYH, NBN, NF1, NTHL1, PMS2, POLD1, POLE, RAD51C, RAD51D, RECQL SMAD4, SMARCA4, STK11    Negative     9/30/2022 Surgery    Right lumpectomy with sentinel lymph node biopsy:  - clear margins  - 0/8 lymph nodes    Dr. Motta      10/18/2022 -  Cancer Staged    Staging form: Breast, AJCC 8th Edition  - Pathologic stage from 10/18/2022: No Stage Recommended (ypT1c, pN0(sn), cM0, G2, ER+, RI+, HER2+) - Signed by Azalea Motta MD on 10/18/2022  Stage prefix: Post-therapy  Method of lymph node assessment: [place] lymph node biopsy  Histologic grading system: 3 grade system       11/11/2022 - 12/9/2022 Radiation    Plan ID Energy Fractions Dose per Fraction (cGy) Dose Correction (cGy) Total Dose Delivered (cGy) Elapsed Days   R Breast 6X 16 / 16 266 0 4,256 24   R Brst Boost 12E 4 / 4 250 0 1,000 3   Dr. Henry     11/2022 -  Hormone Therapy    Letrozole   Dr. Jason         History of Present Illness: Breast cancer follow-up is no breast referable concerns, continues on letrozole  -Interval History: None    Review of Systems:  Review of Systems   Constitutional: Negative.  Negative for appetite change, fever and unexpected weight change.   HENT: Negative.  Negative for trouble swallowing.    Eyes: Negative.    Respiratory: Negative.  Negative for cough and shortness of breath.    Cardiovascular: Negative.  Negative for chest pain.   Gastrointestinal: Negative.  Negative for abdominal pain, nausea and vomiting.   Endocrine: Negative.    Genitourinary: Negative.  Negative for dysuria.   Musculoskeletal: Negative.  Negative for arthralgias and myalgias.   Skin: Negative.    Allergic/Immunologic: Negative.    Neurological: Negative.  Negative for headaches.   Hematological: Negative.  Negative for adenopathy. Does not bruise/bleed easily.   Psychiatric/Behavioral:  The patient is nervous/anxious.        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    Asthma 2018 2018 to Jan 2021, i was diagnosed with asthma, jan 2021, i did nit have asthma according to the breathing test the asthma doctor performed, i get allergiy shot every 2 weeks for 2 different types of molds    Diarrhea     occasional    Port-A-Cath in place     R side chest     Past Surgical History:   Procedure Laterality Date    BREAST BIOPSY Right 03/29/2022    [facility]    BREAST LUMPECTOMY Right 09/30/2022    Procedure: ML BREAST;  Surgeon: Azalea Motta MD;  Location: [facility];  Service: Surgical Oncology    CATARACT EXTRACTION Left 2019    with lens implant    IR PORT PLACEMENT  04/20/2022    LYMPH NODE BIOPSY Right 09/30/2022    Procedure: SLN BX;  Surgeon: Azalea Motta MD;  Location: [facility];  Service: Surgical Oncology    MANDIBLE SURGERY      jaw surgery for crooked jaw 1993, 1998    ROTATOR CUFF REPAIR Right 2020    US BREAST ML  NEEDLE LOC RIGHT Right 03/29/2022    US GUIDED BREAST BIOPSY RIGHT COMPLETE Right 03/29/2022     Family History   Problem Relation Age of Onset    Prostate cancer Father     Asthma Father         He has had asthma previously    No Known Problems Sister     Asthma Brother         Has had asthma previously    No Known Problems Daughter     No Known Problems Daughter     Lung cancer Maternal Aunt     No Known Problems Paternal Aunt     No Known Problems Paternal Aunt     No Known Problems Maternal Grandmother     No Known Problems Maternal Grandfather     Breast cancer Paternal Grandmother 80        I was told she had breast cancer in her 80’s.    No Known Problems Paternal Grandfather     Stomach cancer Other      Social History     Socioeconomic History    Marital status: /Civil Union     Spouse name: Not on file    Number of children: Not on file    Years of education: Not on file    Highest education level: Not on file   Occupational History    Not on  file   Tobacco Use    Smoking status: Never     Passive exposure: Never    Smokeless tobacco: Never   Vaping Use    Vaping status: Never Used   Substance and Sexual Activity    Alcohol use: Not Currently    Drug use: Never    Sexual activity: Not Currently     Partners: Male     Birth control/protection: Condom Male   Other Topics Concern    Not on file   Social History Narrative    Not on file     Social Determinants of Health     Financial Resource Strain: Not on file   Food Insecurity: Not on file   Transportation Needs: Not on file   Physical Activity: Not on file   Stress: Not on file   Social Connections: Not on file   Intimate Partner Violence: Not on file   Housing Stability: Not on file       Current Outpatient Medications:     albuterol (PROVENTIL HFA,VENTOLIN HFA) 90 mcg/act inhaler, , Disp: , Rfl:     CALCIUM PO, Take by mouth, Disp: , Rfl:     cetirizine (ZyrTEC) 10 mg tablet, Take 10 mg by mouth daily, Disp: , Rfl:     denosumab (Prolia) 60 mg/mL, as directed Subcutaneous, Disp: , Rfl:     EPINEPHrine (EPIPEN) 0.3 mg/0.3 mL SOAJ, , Disp: , Rfl:     letrozole (FEMARA) 2.5 mg tablet, Take 1 tablet (2.5 mg total) by mouth daily, Disp: 30 tablet, Rfl: 5    lidocaine-prilocaine (EMLA) cream, Apply topically on the skin over the Port-A-Cath as advised, Disp: 30 g, Rfl: 1    loperamide (IMODIUM) 2 mg capsule, Take 2 mg by mouth 4 (four) times a day as needed for diarrhea, Disp: , Rfl:     Multiple Vitamins-Minerals (multivitamin with minerals) tablet, Take 1 tablet by mouth daily, Disp: , Rfl:     sodium chloride (OCEAN) 0.65 % nasal spray, Saline Nasal Mist, Disp: , Rfl:     VITAMIN D PO, Take by mouth, Disp: , Rfl:     albuterol (ACCUNEB) 1.25 MG/3ML nebulizer solution, Take 1.25 mg by nebulization every 6 (six) hours as needed for wheezing (Patient not taking: Reported on 7/6/2023), Disp: , Rfl:     cetirizine (ZyrTEC) 10 mg tablet, Take 10 mg by mouth daily (Patient not taking: Reported on 1/10/2024),  Disp: , Rfl:   Allergies   Allergen Reactions    Molds & Smuts Hives and Anaphylaxis     Sore throat  Sore throat      Other Other (See Comments), Anaphylaxis and Hives     Mold/gets sore throat    Nuts - Food Allergy Hives     BRAZILIAN NUTS           The following portions of the patient's history were reviewed and updated as appropriate: allergies, current medications, past family history, past medical history, past social history, past surgical history, and problem list.        Vitals:    01/10/24 1427   BP: 140/82   Pulse: 82   Temp: 98.1 °F (36.7 °C)   SpO2: 98%       Physical Exam  Constitutional:       General: She is not in acute distress.     Appearance: Normal appearance. She is well-developed.   HENT:      Head: Normocephalic and atraumatic.   Cardiovascular:      Heart sounds: Normal heart sounds.   Pulmonary:      Breath sounds: Normal breath sounds.   Chest:   Breasts:     Right: Skin change (lumpectomy scar) present. No swelling, bleeding, inverted nipple, mass, nipple discharge or tenderness.      Left: No swelling, bleeding, inverted nipple, mass, nipple discharge, skin change or tenderness.      Comments: Port right infraclavicular  Abdominal:      Palpations: Abdomen is soft.   Musculoskeletal:      Right lower leg: No edema.      Left lower leg: No edema.   Lymphadenopathy:      Upper Body:      Right upper body: No supraclavicular, axillary or pectoral adenopathy.      Left upper body: No supraclavicular, axillary or pectoral adenopathy.   Neurological:      Mental Status: She is alert and oriented to person, place, and time.   Psychiatric:         Mood and Affect: Mood normal.           Results:  Labs:      Imaging  3/30/2023 bilateral 3D diagnostic mammogram postsurgical changes only on the right side    12/6/2023 CAT scan chest pelvis and bone scan negative    I reviewed the above imaging data.    Discussion/Summary: 63-year-old female status post right breast conservation following  neoadjuvant chemotherapy.  She had radiation therapy.  She continues on letrozole.  There is no evidence disease based on exam today.  Her last mammogram was benign.  Her recent staging scans showed no concerns.  She is scheduled for surgery with plastics in March which will interfere with doing a timely mammogram.  I will therefore order this for now.  We will see her again in 6 months in our survivorship clinic or sooner should the need arise.

## 2024-01-11 ENCOUNTER — TELEPHONE (OUTPATIENT)
Dept: HEMATOLOGY ONCOLOGY | Facility: CLINIC | Age: 64
End: 2024-01-11

## 2024-01-11 ENCOUNTER — PATIENT MESSAGE (OUTPATIENT)
Dept: SURGICAL ONCOLOGY | Facility: CLINIC | Age: 64
End: 2024-01-11

## 2024-01-11 DIAGNOSIS — R92.30 DENSE BREASTS: Primary | ICD-10-CM

## 2024-01-11 DIAGNOSIS — R92.2 DENSE BREASTS: Primary | ICD-10-CM

## 2024-01-11 NOTE — TELEPHONE ENCOUNTER
Returned call to pt. She requested an order for an ABUS study. Discussed with Dr Motta and order placed. Emily will call to schedule.

## 2024-01-11 NOTE — TELEPHONE ENCOUNTER
Call Transfer   Who are you speaking with?  Patient   If it is not the patient, are they listed on an active communication consent form? Yes   Who is the patients HemOnc/SurgOnc provider? Dr. Motta   What is the reason for this call? Need orders for special imaging   Person/Department that the call was transferred to?    Time that call was transferred?   TANVIR Root 2:33pm 1/11   Your call will be transferred now. If you receive a voicemail, please leave a detailed message and a member of the team will return your call as soon as possible.    Did you relay this information to the caller?  Yes

## 2024-01-18 ENCOUNTER — HOSPITAL ENCOUNTER (OUTPATIENT)
Dept: INFUSION CENTER | Facility: CLINIC | Age: 64
Discharge: HOME | End: 2024-01-18
Payer: COMMERCIAL

## 2024-01-18 DIAGNOSIS — T38.6X5A OSTEOPOROSIS DUE TO AROMATASE INHIBITOR: ICD-10-CM

## 2024-01-18 DIAGNOSIS — Z95.828 PORT-A-CATH IN PLACE: Primary | ICD-10-CM

## 2024-01-18 DIAGNOSIS — M81.8 OSTEOPOROSIS DUE TO AROMATASE INHIBITOR: ICD-10-CM

## 2024-01-18 LAB
ALBUMIN SERPL BCP-MCNC: 4.3 G/DL (ref 3.5–5)
ALP SERPL-CCNC: 35 U/L (ref 34–104)
ALT SERPL W P-5'-P-CCNC: 13 U/L (ref 7–52)
ANION GAP SERPL CALCULATED.3IONS-SCNC: 6 MMOL/L
AST SERPL W P-5'-P-CCNC: 18 U/L (ref 13–39)
BILIRUB SERPL-MCNC: 0.43 MG/DL (ref 0.2–1)
BUN SERPL-MCNC: 20 MG/DL (ref 5–25)
CALCIUM SERPL-MCNC: 9.5 MG/DL (ref 8.4–10.2)
CHLORIDE SERPL-SCNC: 105 MMOL/L (ref 96–108)
CO2 SERPL-SCNC: 26 MMOL/L (ref 21–32)
CREAT SERPL-MCNC: 0.69 MG/DL (ref 0.6–1.3)
GFR SERPL CREATININE-BSD FRML MDRD: 93 ML/MIN/1.73SQ M
GLUCOSE SERPL-MCNC: 77 MG/DL (ref 65–140)
POTASSIUM SERPL-SCNC: 4.1 MMOL/L (ref 3.5–5.3)
PROT SERPL-MCNC: 6.7 G/DL (ref 6.4–8.4)
SODIUM SERPL-SCNC: 137 MMOL/L (ref 135–147)

## 2024-01-18 PROCEDURE — 80053 COMPREHEN METABOLIC PANEL: CPT | Performed by: INTERNAL MEDICINE

## 2024-01-18 NOTE — PROGRESS NOTES
Emiyl Hernandez  tolerated treatment well with no complications.      Emily Hernandez is aware of future appt on 1/30 at 12.     AVS printed and given to Emily Hernandez:  Yes

## 2024-01-23 ENCOUNTER — TELEPHONE (OUTPATIENT)
Dept: PLASTIC SURGERY | Facility: CLINIC | Age: 64
End: 2024-01-23

## 2024-01-23 NOTE — TELEPHONE ENCOUNTER
Patient returning Gil call, advised that I would have him reach back out at this earliest convenience.

## 2024-01-23 NOTE — TELEPHONE ENCOUNTER
Left message for patient to call me back to confirm new day and time for pre op appointment with Dr. Diamond.

## 2024-01-24 ENCOUNTER — TELEPHONE (OUTPATIENT)
Dept: HEMATOLOGY ONCOLOGY | Facility: CLINIC | Age: 64
End: 2024-01-24

## 2024-01-24 NOTE — TELEPHONE ENCOUNTER
Appointment Change  Cancel, Reschedule, Change to Virtual      Who are you speaking with? Patient   If it is not the patient, is the caller listed on the communication consent form? Yes   Which provider is the appointment scheduled with? HUSSAIN De Luna   When was the original appointment scheduled?    Please list date and time 6/11   At which location is the appointment scheduled to take place? [place]   Was the appointment rescheduled?     Was the appointment changed from an in person visit to a virtual visit?    If so, please list the details of the change. 7/11 2pm   What is the reason for the appointment change? Sched conflict       Was STAR transport scheduled? No   Does STAR transport need to be scheduled for the new visit (if applicable) No   Does the patient need an infusion appointment rescheduled? No   Does the patient have an upcoming infusion appointment scheduled? If so, when? Yes, 1/30   Is the patient undergoing chemotherapy? No   For appointments cancelled with less than 24 hours:  Was the no-show policy reviewed? Yes

## 2024-01-24 NOTE — TELEPHONE ENCOUNTER
Patient Call    Who are you speaking with? Patient    If it is not the patient, are they listed on an active communication consent form? Yes   What is the reason for this call? Are labs going to be needed every 3 months?  How long for?   Does this require a call back? Yes   If a call back is required, please list [facility] call back number [phone]    If a call back is required, advise that a message will be forwarded to their care team and someone will return their call as soon as possible.   Did you relay this information to the patient? Yes

## 2024-01-25 ENCOUNTER — TELEPHONE (OUTPATIENT)
Dept: HEMATOLOGY ONCOLOGY | Facility: CLINIC | Age: 64
End: 2024-01-25

## 2024-01-25 ENCOUNTER — TELEPHONE (OUTPATIENT)
Dept: PLASTIC SURGERY | Facility: CLINIC | Age: 64
End: 2024-01-25

## 2024-01-25 ENCOUNTER — HOSPITAL ENCOUNTER (OUTPATIENT)
Dept: NON INVASIVE DIAGNOSTICS | Facility: CLINIC | Age: 64
Discharge: HOME | End: 2024-01-25
Payer: COMMERCIAL

## 2024-01-25 VITALS
BODY MASS INDEX: 21.33 KG/M2 | WEIGHT: 128 LBS | HEART RATE: 82 BPM | DIASTOLIC BLOOD PRESSURE: 82 MMHG | SYSTOLIC BLOOD PRESSURE: 140 MMHG | HEIGHT: 65 IN

## 2024-01-25 DIAGNOSIS — T45.1X5A CHEMOTHERAPY INDUCED CARDIOMYOPATHY: ICD-10-CM

## 2024-01-25 DIAGNOSIS — I42.7 CHEMOTHERAPY INDUCED CARDIOMYOPATHY: ICD-10-CM

## 2024-01-25 LAB
AORTIC ROOT: 3.4 CM
APICAL FOUR CHAMBER EJECTION FRACTION: 52 %
APICAL TWO CHAMBER EJECTION FRACTION: 58 %
ASCENDING AORTA: 3.2 CM
BSA FOR ECHO PROCEDURE: 1.64 M2
E WAVE DECELERATION TIME: 188 MS
E/A RATIO: 0.86
FRACTIONAL SHORTENING: 49 (ref 28–44)
GLOBAL LONGITUIDAL STRAIN: -20 %
INTERVENTRICULAR SEPTUM IN DIASTOLE (PARASTERNAL SHORT AXIS VIEW): 0.8 CM
INTERVENTRICULAR SEPTUM: 0.8 CM (ref 0.6–1.1)
LAAS-AP2: 13.6 CM2
LAAS-AP4: 16.2 CM2
LEFT ATRIUM SIZE: 2.9 CM
LEFT ATRIUM VOLUME (MOD BIPLANE): 38 ML
LEFT ATRIUM VOLUME INDEX (MOD BIPLANE): 23.2 ML/M2
LEFT INTERNAL DIMENSION IN SYSTOLE: 2 CM (ref 2.1–4)
LEFT VENTRICULAR INTERNAL DIMENSION IN DIASTOLE: 3.9 CM (ref 3.5–6)
LEFT VENTRICULAR POSTERIOR WALL IN END DIASTOLE: 0.8 CM
LEFT VENTRICULAR STROKE VOLUME: 53 ML
LV EF: 56 %
LVSV (TEICH): 53 ML
MV E'TISSUE VEL-LAT: 12 CM/S
MV E'TISSUE VEL-SEP: 10 CM/S
MV PEAK A VEL: 0.57 M/S
MV PEAK E VEL: 49 CM/S
MV STENOSIS PRESSURE HALF TIME: 55 MS
MV VALVE AREA P 1/2 METHOD: 4
RA PRESSURE ESTIMATED: 3 MMHG
RV PSP: 25 MMHG
SL CV LEFT ATRIUM LENGTH A2C: 4.5 CM
SL CV LV EF: 55
SL CV PED ECHO LEFT VENTRICLE DIASTOLIC VOLUME (MOD BIPLANE) 2D: 66 ML
SL CV PED ECHO LEFT VENTRICLE SYSTOLIC VOLUME (MOD BIPLANE) 2D: 13 ML
TR MAX PG: 22 MMHG
TR PEAK VELOCITY: 2.4 M/S
TRICUSPID ANNULAR PLANE SYSTOLIC EXCURSION: 2.1 CM
TRICUSPID VALVE PEAK REGURGITATION VELOCITY: 2.35 M/S

## 2024-01-25 PROCEDURE — 93356 MYOCRD STRAIN IMG SPCKL TRCK: CPT | Performed by: INTERNAL MEDICINE

## 2024-01-25 PROCEDURE — 93308 TTE F-UP OR LMTD: CPT

## 2024-01-25 PROCEDURE — 93321 DOPPLER ECHO F-UP/LMTD STD: CPT | Performed by: INTERNAL MEDICINE

## 2024-01-25 PROCEDURE — 93325 DOPPLER ECHO COLOR FLOW MAPG: CPT

## 2024-01-25 PROCEDURE — 93321 DOPPLER ECHO F-UP/LMTD STD: CPT

## 2024-01-25 PROCEDURE — 93308 TTE F-UP OR LMTD: CPT | Performed by: INTERNAL MEDICINE

## 2024-01-25 PROCEDURE — 93325 DOPPLER ECHO COLOR FLOW MAPG: CPT | Performed by: INTERNAL MEDICINE

## 2024-01-25 NOTE — TELEPHONE ENCOUNTER
Left message for patient informing her labs are needed prior to her follow up in April. Left call back number with questions or concerns.

## 2024-01-25 NOTE — TELEPHONE ENCOUNTER
Patient Call    Who are you speaking with? Patient    If it is not the patient, are they listed on an active communication consent form? N/A   What is the reason for this call? Patient has surgery on 3/13/24 and Eren recommended to get the labs a week prior to his appointment. She is asking if she can do it on 3/7/24 instead.    She has questions about how often her blood work does need to be done, since the port is every 3 weeks or 6 months when she sees Dr. Jason.      She said she left the message earlier and missed the call and will do her best to make sure she gets it.   Does this require a call back? Yes   If a call back is required, please list best call back number [phone]   If a call back is required, advise that a message will be forwarded to their care team and someone will return their call as soon as possible.   Did you relay this information to the patient? Yes

## 2024-01-25 NOTE — TELEPHONE ENCOUNTER
Emailed patient to let her know I did resend her message to Dr. Diamond to please call patient as she has a question about after surgery.    Did also reach out to Dr. Diamond via tiger text with message to please contact patient at her convenience.

## 2024-01-25 NOTE — TELEPHONE ENCOUNTER
Patient returned call to check on response about her being able to drive and get labs completed by other provider after surgery. I advised Dr. Diamond is in surgery that once she is done a call would be made.    Patient understands. I did make her aware office will be closing at 5 pm if anything we have the phones available till 5:30pm  and she kurtis call tomorrow as well for update.     Best number to use is  [phone].

## 2024-01-26 ENCOUNTER — TELEPHONE (OUTPATIENT)
Dept: HEMATOLOGY ONCOLOGY | Facility: CLINIC | Age: 64
End: 2024-01-26

## 2024-01-26 DIAGNOSIS — C50.111 MALIGNANT NEOPLASM OF CENTRAL PORTION OF RIGHT BREAST IN FEMALE, ESTROGEN RECEPTOR POSITIVE: ICD-10-CM

## 2024-01-26 DIAGNOSIS — Z95.828 PORT-A-CATH IN PLACE: Primary | ICD-10-CM

## 2024-01-26 DIAGNOSIS — Z17.0 MALIGNANT NEOPLASM OF CENTRAL PORTION OF RIGHT BREAST IN FEMALE, ESTROGEN RECEPTOR POSITIVE: ICD-10-CM

## 2024-01-26 NOTE — TELEPHONE ENCOUNTER
Received message from patient requesting port removal and questions about how frequently she should have her labs completed, spoke with Dr. Jason, patient is advised to go for labs every 6 months, referral to IR was placed for port removal. Patient notified.

## 2024-01-26 NOTE — TELEPHONE ENCOUNTER
Returned telephone call spoke with Pt.  It is ok to have labs drawn on 3/7/24 per Dr Jason.  Protocol for port flushing is q 6 to 8 weeks.  It is ok to keep the port for now.  Dr Jason ordered labs for q 12 weeks so q other port flush have labs drawn.  Pt stated she will continue thinking about having the port removed.

## 2024-01-27 ENCOUNTER — TELEPHONE (OUTPATIENT)
Dept: OTHER | Facility: HOSPITAL | Age: 64
End: 2024-01-27

## 2024-01-27 NOTE — TELEPHONE ENCOUNTER
Called and left message for the patient. I stated she should be able to drive following surgery once she is off all narcotic pain meds within 1 week of surgery. She should be able to go to her scheduled appointments 3-4 weeks after her surgery. Instructed to call back with any additional questions.

## 2024-01-27 NOTE — TELEPHONE ENCOUNTER
----- Message from Melody Monsalve sent at 1/25/2024  4:23 PM EST -----  Regarding: FW: After surgery question.   Contact: [phone]    ----- Message -----  From: Esme Rudolph MA  Sent: 1/25/2024   4:22 PM EST  To: Melody Monsalve; #  Subject: FW: After surgery question.

## 2024-01-27 NOTE — TELEPHONE ENCOUNTER
----- Message from Melody Monsalve sent at 1/25/2024  4:23 PM EST -----  Regarding: FW: After surgery question.   Contact: [phone]    ----- Message -----  From: Esme Rudolph MA  Sent: 1/25/2024   4:22 PM EST  To: Melody Monsalve; #  Subject: FW: After surgery question.

## 2024-01-30 ENCOUNTER — HOSPITAL ENCOUNTER (OUTPATIENT)
Dept: INFUSION CENTER | Facility: CLINIC | Age: 64
Discharge: HOME | End: 2024-01-30
Payer: COMMERCIAL

## 2024-01-30 VITALS
RESPIRATION RATE: 18 BRPM | TEMPERATURE: 98.1 F | DIASTOLIC BLOOD PRESSURE: 75 MMHG | SYSTOLIC BLOOD PRESSURE: 134 MMHG | HEART RATE: 91 BPM

## 2024-01-30 DIAGNOSIS — M81.8 OSTEOPOROSIS DUE TO AROMATASE INHIBITOR: Primary | ICD-10-CM

## 2024-01-30 DIAGNOSIS — T38.6X5A OSTEOPOROSIS DUE TO AROMATASE INHIBITOR: Primary | ICD-10-CM

## 2024-01-30 PROCEDURE — 96372 THER/PROPH/DIAG INJ SC/IM: CPT

## 2024-01-30 RX ADMIN — DENOSUMAB 60 MG: 60 INJECTION SUBCUTANEOUS at 12:30

## 2024-01-30 NOTE — PROGRESS NOTES
Patient arrived to unit without complaint. Patient's parameters reviewed. Patient denies any recent or planned invasive dental procedures. Patient tolerated Prolia SC to left arm without incident. AVS declined and patient aware of next appointment 2/15/24 at 12:00 for port flush/labs. Patient left in stable condition.

## 2024-02-01 ENCOUNTER — OFFICE VISIT (OUTPATIENT)
Dept: LAB | Facility: HOSPITAL | Age: 64
End: 2024-02-01
Payer: COMMERCIAL

## 2024-02-01 ENCOUNTER — APPOINTMENT (OUTPATIENT)
Dept: LAB | Facility: HOSPITAL | Age: 64
End: 2024-02-01
Payer: COMMERCIAL

## 2024-02-01 DIAGNOSIS — Z85.3 PERSONAL HISTORY OF BREAST CANCER: ICD-10-CM

## 2024-02-01 DIAGNOSIS — C50.111 MALIGNANT NEOPLASM OF CENTRAL PORTION OF RIGHT BREAST IN FEMALE, ESTROGEN RECEPTOR NEGATIVE: ICD-10-CM

## 2024-02-01 DIAGNOSIS — Z17.1 MALIGNANT NEOPLASM OF CENTRAL PORTION OF RIGHT BREAST IN FEMALE, ESTROGEN RECEPTOR NEGATIVE: ICD-10-CM

## 2024-02-01 LAB
ANION GAP SERPL CALCULATED.3IONS-SCNC: 6 MMOL/L
ATRIAL RATE: 88 BPM
BASOPHILS # BLD AUTO: 0.08 THOUSANDS/ÂΜL (ref 0–0.1)
BASOPHILS NFR BLD AUTO: 1 % (ref 0–1)
BUN SERPL-MCNC: 15 MG/DL (ref 5–25)
CALCIUM SERPL-MCNC: 9.4 MG/DL (ref 8.4–10.2)
CHLORIDE SERPL-SCNC: 102 MMOL/L (ref 96–108)
CO2 SERPL-SCNC: 30 MMOL/L (ref 21–32)
CREAT SERPL-MCNC: 0.69 MG/DL (ref 0.6–1.3)
EOSINOPHIL # BLD AUTO: 0.38 THOUSAND/ÂΜL (ref 0–0.61)
EOSINOPHIL NFR BLD AUTO: 7 % (ref 0–6)
ERYTHROCYTE [DISTWIDTH] IN BLOOD BY AUTOMATED COUNT: 12.1 % (ref 11.6–15.1)
GFR SERPL CREATININE-BSD FRML MDRD: 93 ML/MIN/1.73SQ M
GLUCOSE P FAST SERPL-MCNC: 79 MG/DL (ref 65–99)
HCT VFR BLD AUTO: 40.8 % (ref 34.8–46.1)
HGB BLD-MCNC: 13.5 G/DL (ref 11.5–15.4)
IMM GRANULOCYTES # BLD AUTO: 0.01 THOUSAND/UL (ref 0–0.2)
IMM GRANULOCYTES NFR BLD AUTO: 0 % (ref 0–2)
LYMPHOCYTES # BLD AUTO: 1.51 THOUSANDS/ÂΜL (ref 0.6–4.47)
LYMPHOCYTES NFR BLD AUTO: 27 % (ref 14–44)
MCH RBC QN AUTO: 31.7 PG (ref 26.8–34.3)
MCHC RBC AUTO-ENTMCNC: 33.1 G/DL (ref 31.4–37.4)
MCV RBC AUTO: 96 FL (ref 82–98)
MONOCYTES # BLD AUTO: 0.39 THOUSAND/ÂΜL (ref 0.17–1.22)
MONOCYTES NFR BLD AUTO: 7 % (ref 4–12)
NEUTROPHILS # BLD AUTO: 3.24 THOUSANDS/ÂΜL (ref 1.85–7.62)
NEUTS SEG NFR BLD AUTO: 58 % (ref 43–75)
NRBC BLD AUTO-RTO: 0 /100 WBCS
P AXIS: 76 DEGREES
PLATELET # BLD AUTO: 230 THOUSANDS/UL (ref 149–390)
PMV BLD AUTO: 8.8 FL (ref 8.9–12.7)
POTASSIUM SERPL-SCNC: 3.8 MMOL/L (ref 3.5–5.3)
PR INTERVAL: 164 MS
QRS AXIS: 97 DEGREES
QRSD INTERVAL: 98 MS
QT INTERVAL: 356 MS
QTC INTERVAL: 430 MS
RBC # BLD AUTO: 4.26 MILLION/UL (ref 3.81–5.12)
SODIUM SERPL-SCNC: 138 MMOL/L (ref 135–147)
T WAVE AXIS: 58 DEGREES
VENTRICULAR RATE: 88 BPM
WBC # BLD AUTO: 5.61 THOUSAND/UL (ref 4.31–10.16)

## 2024-02-01 PROCEDURE — 93005 ELECTROCARDIOGRAM TRACING: CPT

## 2024-02-01 PROCEDURE — 85025 COMPLETE CBC W/AUTO DIFF WBC: CPT

## 2024-02-01 PROCEDURE — 80048 BASIC METABOLIC PNL TOTAL CA: CPT

## 2024-02-01 PROCEDURE — 36415 COLL VENOUS BLD VENIPUNCTURE: CPT

## 2024-02-02 DIAGNOSIS — Z17.0 MALIGNANT NEOPLASM OF CENTRAL PORTION OF RIGHT BREAST IN FEMALE, ESTROGEN RECEPTOR POSITIVE: ICD-10-CM

## 2024-02-02 DIAGNOSIS — C50.111 MALIGNANT NEOPLASM OF CENTRAL PORTION OF RIGHT BREAST IN FEMALE, ESTROGEN RECEPTOR POSITIVE: ICD-10-CM

## 2024-02-02 RX ORDER — LETROZOLE 2.5 MG/1
2.5 TABLET, FILM COATED ORAL DAILY
Qty: 30 TABLET | Refills: 5 | Status: SHIPPED | OUTPATIENT
Start: 2024-02-02

## 2024-02-15 ENCOUNTER — HOSPITAL ENCOUNTER (OUTPATIENT)
Dept: INFUSION CENTER | Facility: CLINIC | Age: 64
Discharge: HOME | End: 2024-02-15
Payer: COMMERCIAL

## 2024-02-15 DIAGNOSIS — Z95.828 PORT-A-CATH IN PLACE: Primary | ICD-10-CM

## 2024-02-15 PROCEDURE — 96523 IRRIG DRUG DELIVERY DEVICE: CPT

## 2024-02-15 NOTE — PROGRESS NOTES
Emily Hernandez presented for port flush, which tolerated well with no complications.      Emily Hernandez is aware of future appt on Thursday Mar 7, 2024 12:00 PM.    AVS declined by Emily Hernandez.

## 2024-02-23 ENCOUNTER — HOSPITAL ENCOUNTER (OUTPATIENT)
Dept: MAMMOGRAPHY | Facility: CLINIC | Age: 64
Discharge: HOME | End: 2024-02-23
Payer: COMMERCIAL

## 2024-02-23 ENCOUNTER — HOSPITAL ENCOUNTER (OUTPATIENT)
Dept: ULTRASOUND IMAGING | Facility: CLINIC | Age: 64
Discharge: HOME | End: 2024-02-23
Payer: COMMERCIAL

## 2024-02-23 DIAGNOSIS — C50.111 MALIGNANT NEOPLASM OF CENTRAL PORTION OF RIGHT BREAST IN FEMALE, ESTROGEN RECEPTOR POSITIVE: ICD-10-CM

## 2024-02-23 DIAGNOSIS — R92.2 DENSE BREASTS: ICD-10-CM

## 2024-02-23 DIAGNOSIS — R92.30 DENSE BREASTS: ICD-10-CM

## 2024-02-23 DIAGNOSIS — Z17.0 MALIGNANT NEOPLASM OF CENTRAL PORTION OF RIGHT BREAST IN FEMALE, ESTROGEN RECEPTOR POSITIVE: ICD-10-CM

## 2024-02-23 PROCEDURE — 76641 ULTRASOUND BREAST COMPLETE: CPT

## 2024-02-23 PROCEDURE — 77066 DX MAMMO INCL CAD BI: CPT

## 2024-02-23 PROCEDURE — G0279 TOMOSYNTHESIS, MAMMO: HCPCS

## 2024-02-28 ENCOUNTER — ANESTHESIA (OUTPATIENT)
Dept: ANESTHESIOLOGY | Facility: HOSPITAL | Age: 64
End: 2024-02-28

## 2024-02-28 ENCOUNTER — TELEPHONE (OUTPATIENT)
Age: 64
End: 2024-02-28

## 2024-02-28 ENCOUNTER — OFFICE VISIT (OUTPATIENT)
Dept: PLASTIC SURGERY | Facility: CLINIC | Age: 64
End: 2024-02-28
Payer: COMMERCIAL

## 2024-02-28 ENCOUNTER — ANESTHESIA EVENT (OUTPATIENT)
Dept: ANESTHESIOLOGY | Facility: HOSPITAL | Age: 64
End: 2024-02-28

## 2024-02-28 VITALS
BODY MASS INDEX: 20.49 KG/M2 | WEIGHT: 123 LBS | TEMPERATURE: 96 F | HEIGHT: 65 IN | HEART RATE: 90 BPM | DIASTOLIC BLOOD PRESSURE: 76 MMHG | SYSTOLIC BLOOD PRESSURE: 125 MMHG

## 2024-02-28 DIAGNOSIS — Z85.3 HISTORY OF RIGHT BREAST CANCER: Primary | ICD-10-CM

## 2024-02-28 DIAGNOSIS — Z92.3 HISTORY OF RADIATION THERAPY: ICD-10-CM

## 2024-02-28 DIAGNOSIS — L59.8 RADIATION FIBROSIS OF SOFT TISSUE FROM THERAPEUTIC PROCEDURE: ICD-10-CM

## 2024-02-28 DIAGNOSIS — N64.89 BREAST ASYMMETRY IN FEMALE: ICD-10-CM
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
## 2024-02-28 PROCEDURE — 99215 OFFICE O/P EST HI 40 MIN: CPT | Performed by: PLASTIC SURGERY

## 2024-02-28 RX ORDER — ALBUTEROL SULFATE 2.5 MG/3ML
SOLUTION RESPIRATORY (INHALATION)
COMMUNITY
Start: 2024-02-06

## 2024-02-28 RX ORDER — CHLORHEXIDINE GLUCONATE ORAL RINSE 1.2 MG/ML
15 SOLUTION DENTAL 2 TIMES DAILY
Qty: 150 ML | Refills: 0 | Status: SHIPPED | OUTPATIENT
Start: 2024-02-28 | End: 2024-03-04

## 2024-02-28 RX ORDER — DOCUSATE SODIUM 100 MG/1
100 CAPSULE, LIQUID FILLED ORAL 3 TIMES DAILY PRN
Qty: 40 CAPSULE | Refills: 1 | Status: SHIPPED | OUTPATIENT
Start: 2024-02-28

## 2024-02-28 RX ORDER — ONDANSETRON 4 MG/1
4 TABLET, ORALLY DISINTEGRATING ORAL EVERY 6 HOURS PRN
Qty: 20 TABLET | Refills: 0 | Status: SHIPPED | OUTPATIENT
Start: 2024-02-28

## 2024-02-28 RX ORDER — ACETAMINOPHEN 325 MG/1
975 TABLET ORAL EVERY 6 HOURS
Qty: 168 TABLET | Refills: 0 | Status: SHIPPED | OUTPATIENT
Start: 2024-02-28 | End: 2024-03-13

## 2024-02-28 RX ORDER — SENNOSIDES 8.6 MG
17.2 TABLET ORAL
Qty: 30 TABLET | Refills: 1 | Status: SHIPPED | OUTPATIENT
Start: 2024-02-28

## 2024-02-28 RX ORDER — OXYCODONE HYDROCHLORIDE 5 MG/1
5 TABLET ORAL EVERY 4 HOURS PRN
Qty: 12 TABLET | Refills: 0 | Status: SHIPPED | OUTPATIENT
Start: 2024-02-28

## 2024-02-28 NOTE — TELEPHONE ENCOUNTER
Patient says she was sent home with a packet to look over before her upcoming surgery on 3/11/24. She says she had asked Tracey before leaving if she would be prescribed Levonox but she could not give her a definite answer. Patient asking if Dr. Diamond would know before hand if she will prescribe this to her. Please advise.

## 2024-02-28 NOTE — PROGRESS NOTES
[facility] Surgery H&P  [facility]astic  And Reconstructive Surgery   [address] [place] [address]        Assessment/Plan:     Assessment:  1. Acquired breast asymmetry  2. Hx of right breast cancer  3. S/P lumpectomy and RT  4. Radiation fibrosis of right breast     Plan:  I had a lengthy discussion with the patient regarding breast reconstruction options.  I recommended  fat grafting to the right breast with symmetrizing mastopexy procedure on the left.  All details of the surgical procedures were discussed at length with the patient including operative and recovery time, as well as, all potential risks, benefits and alternatives.  Risks discussed included, but were not limited to: infection, bleeding, scarring, hematoma, seroma, delayed, healing, partial or total fat graft loss, open wound, continued asymmetry, damage to nerves/blood vessels, numbness or hypersensitivity, need for additional procedures. The patient noted her understanding and had opportunity for questions. All were answered. She desires to proceed. Informed consent obtained. Patient instructed to stop her letrazole, and all supplements 5-7 days prior to procedure. Pre/post op instructions and restrictions discussed. Use of IRENE dressings, potential drains discussed. Pos-op meds sent to pharmacy. She is welcome to call or return with any additional questions or concerns.     Subjective       Emily Hernandez is a 63 y.o. female who presents for pre- op evaluation of acquired breast asymmetry.  Patient has history of right breast cancer.  She underwent right lumpectomy and adjuvant radiation therapy.  She notes radiation changes to the right breast and asymmetry with the left.  She is interested reconstructive surgery for symmetry.  She presents today for evaluation.     Medical History        Past Medical History:   Diagnosis Date   • Anemia March 28,2022     Due to infusion therapy?   • Asthma 2018 2018 to Jan 2021, i was diagnosed  with asthma, jan 2021, i did nit have asthma according to the breathing test the asthma doctor performed, i get allergiy shot every 2 weeks for 2 different types of molds   • Breast cancer (HCC)     • Cancer (HCC) 3/29/2022     Diagnosed with breast cancer   • Diarrhea       occasional   • Port-A-Cath in place       R side chest            Surgical History         Past Surgical History:   Procedure Laterality Date   • BREAST BIOPSY Right 03/29/2022     IDC   • BREAST LUMPECTOMY Right 09/30/2022     Procedure: ML BREAST;  Surgeon: Azalea Motta MD;  Location: [facility];  Service: Surgical Oncology   • CATARACT EXTRACTION Left 2019     with lens implant   • IR PORT PLACEMENT   04/20/2022   • LYMPH NODE BIOPSY Right 09/30/2022     Procedure: SLN BX;  Surgeon: Azalea Motta MD;  Location: [facility];  Service: Surgical Oncology   • MANDIBLE SURGERY         jaw surgery for crooked jaw 1993, 1998   • ROTATOR CUFF REPAIR Right 2020   • US BREAST ML  NEEDLE LOC RIGHT Right 03/29/2022   • US GUIDED BREAST BIOPSY RIGHT COMPLETE Right 03/29/2022                 Current Outpatient Medications:   •  acetaminophen (TYLENOL) 325 mg tablet, Take 3 tablets (975 mg total) by mouth every 6 (six) hours for 14 days, Disp: 168 tablet, Rfl: 0  •  albuterol (2.5 mg/3 mL) 0.083 % nebulizer solution, USE 1 VIAL IN NEBULIZER EVERY 4 HOURS AS NEEDED FOR WHEEZING, Disp: , Rfl:   •  albuterol (PROVENTIL HFA,VENTOLIN HFA) 90 mcg/act inhaler, , Disp: , Rfl:   •  CALCIUM PO, Take by mouth, Disp: , Rfl:   •  cetirizine (ZyrTEC) 10 mg tablet, Take 10 mg by mouth daily, Disp: , Rfl:   •  chlorhexidine (PERIDEX) 0.12 % solution, Apply 15 mL to the mouth or throat 2 (two) times a day for 5 days Use for 5 days before surgery, Disp: 150 mL, Rfl: 0  •  denosumab (Prolia) 60 mg/mL, as directed Subcutaneous, Disp: , Rfl:   •  docusate sodium (COLACE) 100 mg capsule, Take 1 capsule (100 mg total) by mouth 3 (three) times a day as needed for  constipation, Disp: 40 capsule, Rfl: 1  •  EPINEPHrine (EPIPEN) 0.3 mg/0.3 mL SOAJ, , Disp: , Rfl:   •  letrozole (FEMARA) 2.5 mg tablet, Take 1 tablet by mouth once daily, Disp: 30 tablet, Rfl: 5  •  lidocaine-prilocaine (EMLA) cream, Apply topically on the skin over the Port-A-Cath as advised, Disp: 30 g, Rfl: 1  •  loperamide (IMODIUM) 2 mg capsule, Take 2 mg by mouth 4 (four) times a day as needed for diarrhea, Disp: , Rfl:   •  Multiple Vitamins-Minerals (multivitamin with minerals) tablet, Take 1 tablet by mouth daily, Disp: , Rfl:   •  ondansetron (ZOFRAN-ODT) 4 mg disintegrating tablet, Take 1 tablet (4 mg total) by mouth every 6 (six) hours as needed for nausea or vomiting, Disp: 20 tablet, Rfl: 0  •  oxyCODONE (Roxicodone) 5 immediate release tablet, Take 1 tablet (5 mg total) by mouth every 4 (four) hours as needed for moderate pain for up to 12 doses Max Daily Amount: 30 mg, Disp: 12 tablet, Rfl: 0  •  senna (SENOKOT) 8.6 mg, Take 2 tablets (17.2 mg total) by mouth daily at bedtime, Disp: 30 tablet, Rfl: 1  •  sodium chloride (OCEAN) 0.65 % nasal spray, Saline Nasal Mist, Disp: , Rfl:   •  VITAMIN D PO, Take by mouth, Disp: , Rfl:   •  albuterol (ACCUNEB) 1.25 MG/3ML nebulizer solution, Take 1.25 mg by nebulization every 6 (six) hours as needed for wheezing, Disp: , Rfl:   •  cetirizine (ZyrTEC) 10 mg tablet, Take 10 mg by mouth daily, Disp: , Rfl:              Allergies   Allergen Reactions   • Molds & Smuts Hives and Anaphylaxis       Sore throat  Sore throat      • Other Other (See Comments), Anaphylaxis and Hives       Mold/gets sore throat   • Nuts - Food Allergy Hives       BRAZILIAN NUTS               Family History         Family History   Problem Relation Age of Onset   • Breast cancer Mother     • Prostate cancer Father     • Asthma Father           He has had asthma previously   • Breast cancer Father           My Dad was diagnosed with prostate cancer about 20 years ago.   • No Known Problems  "Sister     • No Known Problems Daughter     • No Known Problems Daughter     • Lung cancer Maternal Aunt     • No Known Problems Maternal Aunt     • No Known Problems Paternal Aunt     • No Known Problems Paternal Aunt     • No Known Problems Maternal Grandmother     • No Known Problems Maternal Grandfather     • Breast cancer Paternal Grandmother           I was told she had breast cancer in her 80’s.   • No Known Problems Paternal Grandfather     • Cancer Cousin           worked in radiology, needed to have ovary removed.  ? ovarian cancer   • Stomach cancer Other     • Asthma Brother           Has had asthma previously            Social History               Socioeconomic History   • Marital status: /Civil Union       Spouse name: Not on file   • Number of children: Not on file   • Years of education: Not on file   • Highest education level: Not on file   Occupational History   • Not on file   Tobacco Use   • Smoking status: Never       Passive exposure: Never   • Smokeless tobacco: Never   Vaping Use   • Vaping Use: Never used   Substance and Sexual Activity   • Alcohol use: Not Currently   • Drug use: Never   • Sexual activity: Not Currently       Partners: Male       Birth control/protection: Condom Male   Other Topics Concern   • Not on file   Social History Narrative   • Not on file      Social Determinants of Health      Financial Resource Strain: Not on file   Food Insecurity: Not on file   Transportation Needs: Not on file   Physical Activity: Not on file   Stress: Not on file   Social Connections: Not on file   Intimate Partner Violence: Not on file   Housing Stability: Not on file               Review of Systems  Pertinent items are noted in HPI.      Objective     /76 (BP Location: Left arm, Patient Position: Sitting, Cuff Size: Standard)   Pulse 90   Temp (!) 96 °F (35.6 °C) (Tympanic)   Ht 5' 5\" (1.651 m)   Wt 55.8 kg (123 lb)   BMI 20.47 kg/m²        General:  alert and oriented, " in no acute distress   Skin:  normal and no rash or abnormalities   Eyes: conjunctivae/corneas clear. PERRL, EOM's intact. Fundi benign.   Mouth: MMM no lesions   Lymph Nodes:  Cervical, supraclavicular, and axillary nodes normal.   Lungs:  clear to auscultation bilaterally   Heart:  regular rate and rhythm, S1, S2 normal, no murmur, click, rub or gallop and regular rate and rhythm   Abdomen: soft, non-tender; bowel sounds normal; no masses,  no organomegaly   CVA:  absent   Genitourinary: defer exam   Extremities:  extremities normal, warm and well-perfused; no cyanosis, clubbing, or edema   Neurologic:  Alert and oriented x3. Gait normal. Reflexes and motor strength normal and symmetric. Cranial nerves 2-12 and sensation grossly intact.   Psychiatric:  normal mood, behavior, speech, dress, and thought processes   Right breast: Soft, no masses palpable, mild radiation changes with atrophy, scar contraction, especially around NAC complex, very mild hyperpigmentation.  Right breast is  smaller than left breast.  Left breast: Soft, no masses palpable, no axillary adenopathy palpable.  Left breast is moderately larger than the right breast, grade I ptosis present       A total of 45 mins was spent on the encounter, including reviewing the patient's records, documentation, and time spent in counseling coordination of care which represent greater than 50% of the visit. 30 mins was spent in counseling and discussion of surgical procedures/options and answering extensive questions about surgical options.

## 2024-03-01 ENCOUNTER — TELEPHONE (OUTPATIENT)
Dept: HEMATOLOGY ONCOLOGY | Facility: CLINIC | Age: 64
End: 2024-03-01

## 2024-03-01 RX ORDER — ENOXAPARIN SODIUM 100 MG/ML
40 INJECTION SUBCUTANEOUS DAILY
Qty: 2.8 ML | Refills: 0 | Status: SHIPPED | OUTPATIENT
Start: 2024-03-01 | End: 2024-03-08

## 2024-03-01 NOTE — TELEPHONE ENCOUNTER
Medication Refill Request   Who are you speaking with? Patient   If it is not the patient, are they listed on an active communication consent form?     N/A   Which medication is being requested for refill?  Please list medication name and dosage  letrozole (FEMARA) 2.5 mg tablet    How many pills does the patient have left?  5   Preferred Pharmacy / Address [facility] [address] [place] [address]    Who is the prescribing provider? Dr. Jason   Call back number  [phone] please call when order is sent to pharmacy    Relevant Information

## 2024-03-01 NOTE — TELEPHONE ENCOUNTER
T-ZONEcandiet message sent to patient to make her aware that she has 5 refills on her letrozole script. She can contact her pharmacy for refill

## 2024-03-06 ENCOUNTER — HOSPITAL ENCOUNTER (OUTPATIENT)
Dept: INFUSION CENTER | Facility: CLINIC | Age: 64
Discharge: HOME | End: 2024-03-06
Payer: COMMERCIAL

## 2024-03-06 DIAGNOSIS — C50.911 HER2-POSITIVE CARCINOMA OF RIGHT BREAST (HCC): ICD-10-CM

## 2024-03-06 DIAGNOSIS — C50.111 MALIGNANT NEOPLASM OF CENTRAL PORTION OF RIGHT BREAST IN FEMALE, ESTROGEN RECEPTOR POSITIVE: ICD-10-CM

## 2024-03-06 DIAGNOSIS — Z95.828 PORT-A-CATH IN PLACE: Primary | ICD-10-CM

## 2024-03-06 DIAGNOSIS — C77.3 MALIGNANT NEOPLASM METASTATIC TO LYMPH NODE OF AXILLA (HCC): ICD-10-CM

## 2024-03-06 DIAGNOSIS — Z17.0 MALIGNANT NEOPLASM OF CENTRAL PORTION OF RIGHT BREAST IN FEMALE, ESTROGEN RECEPTOR POSITIVE: ICD-10-CM

## 2024-03-06 LAB
ALBUMIN SERPL BCP-MCNC: 4.4 G/DL (ref 3.5–5)
ALP SERPL-CCNC: 33 U/L (ref 34–104)
ALT SERPL W P-5'-P-CCNC: 13 U/L (ref 7–52)
ANION GAP SERPL CALCULATED.3IONS-SCNC: 5 MMOL/L
AST SERPL W P-5'-P-CCNC: 20 U/L (ref 13–39)
BASOPHILS # BLD AUTO: 0.08 THOUSANDS/ÂΜL (ref 0–0.1)
BASOPHILS NFR BLD AUTO: 2 % (ref 0–1)
BILIRUB SERPL-MCNC: 0.5 MG/DL (ref 0.2–1)
BUN SERPL-MCNC: 14 MG/DL (ref 5–25)
CALCIUM SERPL-MCNC: 9.4 MG/DL (ref 8.4–10.2)
CHLORIDE SERPL-SCNC: 104 MMOL/L (ref 96–108)
CO2 SERPL-SCNC: 28 MMOL/L (ref 21–32)
CREAT SERPL-MCNC: 0.66 MG/DL (ref 0.6–1.3)
EOSINOPHIL # BLD AUTO: 0.36 THOUSAND/ÂΜL (ref 0–0.61)
EOSINOPHIL NFR BLD AUTO: 10 % (ref 0–6)
ERYTHROCYTE [DISTWIDTH] IN BLOOD BY AUTOMATED COUNT: 12 % (ref 11.6–15.1)
GFR SERPL CREATININE-BSD FRML MDRD: 94 ML/MIN/1.73SQ M
GLUCOSE SERPL-MCNC: 93 MG/DL (ref 65–140)
HCT VFR BLD AUTO: 38.7 % (ref 34.8–46.1)
HGB BLD-MCNC: 12.5 G/DL (ref 11.5–15.4)
IMM GRANULOCYTES # BLD AUTO: 0 THOUSAND/UL (ref 0–0.2)
IMM GRANULOCYTES NFR BLD AUTO: 0 % (ref 0–2)
LYMPHOCYTES # BLD AUTO: 1.02 THOUSANDS/ÂΜL (ref 0.6–4.47)
LYMPHOCYTES NFR BLD AUTO: 28 % (ref 14–44)
MCH RBC QN AUTO: 30.7 PG (ref 26.8–34.3)
MCHC RBC AUTO-ENTMCNC: 32.3 G/DL (ref 31.4–37.4)
MCV RBC AUTO: 95 FL (ref 82–98)
MONOCYTES # BLD AUTO: 0.31 THOUSAND/ÂΜL (ref 0.17–1.22)
MONOCYTES NFR BLD AUTO: 9 % (ref 4–12)
NEUTROPHILS # BLD AUTO: 1.89 THOUSANDS/ÂΜL (ref 1.85–7.62)
NEUTS SEG NFR BLD AUTO: 51 % (ref 43–75)
NRBC BLD AUTO-RTO: 0 /100 WBCS
PLATELET # BLD AUTO: 202 THOUSANDS/UL (ref 149–390)
PMV BLD AUTO: 9.7 FL (ref 8.9–12.7)
POTASSIUM SERPL-SCNC: 4.3 MMOL/L (ref 3.5–5.3)
PROT SERPL-MCNC: 6.6 G/DL (ref 6.4–8.4)
RBC # BLD AUTO: 4.07 MILLION/UL (ref 3.81–5.12)
SODIUM SERPL-SCNC: 137 MMOL/L (ref 135–147)
WBC # BLD AUTO: 3.66 THOUSAND/UL (ref 4.31–10.16)

## 2024-03-06 PROCEDURE — 80053 COMPREHEN METABOLIC PANEL: CPT

## 2024-03-06 PROCEDURE — 85025 COMPLETE CBC W/AUTO DIFF WBC: CPT

## 2024-03-06 PROCEDURE — 86300 IMMUNOASSAY TUMOR CA 15-3: CPT

## 2024-03-06 NOTE — PROGRESS NOTES
Emily Hernandez  tolerated treatment well with no complications.      Emily Hernandez is aware of future appt on 4/11 at 12pm.     AVS printed and given to Emily Hernandez:    No (Declined by Emily Hernandez)

## 2024-03-07 LAB — CANCER AG27-29 SERPL-ACNC: 14.5 U/ML (ref 0–38.6)

## 2024-03-12 ENCOUNTER — ANESTHESIA EVENT (OUTPATIENT)
Dept: PERIOP | Facility: AMBULARY SURGERY CENTER | Age: 64
End: 2024-03-12
Payer: COMMERCIAL

## 2024-03-12 NOTE — PRE-PROCEDURE INSTRUCTIONS
Pre-Surgery Instructions:   Medication Instructions    albuterol (2.5 mg/3 mL) 0.083 % nebulizer solution May use day of surgery if needed      albuterol (PROVENTIL HFA,VENTOLIN HFA) 90 mcg/act inhaler Use day of surgery if needed and bring with you      CALCIUM PO the patient is already holding    cetirizine (ZyrTEC) 10 mg tablet Hold day of surgery      denosumab (Prolia) 60 mg/mL injection    EPINEPHrine (EPIPEN) 0.3 mg/0.3 mL SOAJ May use day of surgery if needed      letrozole (FEMARA) 2.5 mg tablet per surgeon holding for 5 days    lidocaine-prilocaine (EMLA) cream Hold day of surgery      loperamide (IMODIUM) 2 mg capsule Hold day of surgery      Multiple Vitamins-Minerals (multivitamin with minerals) tablet Pt already holding    sodium chloride (OCEAN) 0.65 % nasal spray May use day of surgery if needed      VITAMIN D PO Pt already holding    Medication instructions for day surgery reviewed. Please use only a sip of water to take your instructed medications. Avoid all over the counter vitamins, supplements and NSAIDS for one week prior to surgery per anesthesia guidelines. Tylenol is ok to take as needed.     You will receive a call one business day prior to surgery with an arrival time and hospital directions. If your surgery is scheduled on a Monday, the hospital will be calling you on the Friday prior to your surgery. If you have not heard from anyone by 8pm, please call the hospital supervisor through the hospital  at [phone]. ([place] [phone] or [place] [phone]).    Do not eat or drink anything after midnight the night before your surgery, including candy, mints, lifesavers, or chewing gum. Do not drink alcohol 24hrs before your surgery. Try not to smoke at least 24hrs before your surgery.       Follow the pre surgery showering instructions as listed in the “My Surgical Experience Booklet” or otherwise provided by your surgeon's office. Do not use a blade to shave the surgical  area 1 week before surgery. It is okay to use a clean electric clippers up to 24 hours before surgery. Do not apply any lotions, creams, including makeup, cologne, deodorant, or perfumes after showering on the day of your surgery. Do not use dry shampoo, hair spray, hair gel, or any type of hair products.     No contact lenses, eye make-up, or artificial eyelashes. Remove nail polish, including gel polish, and any artificial, gel, or acrylic nails if possible. Remove all jewelry including rings and body piercing jewelry.     Wear causal clothing that is easy to take on and off. Consider your type of surgery.    Keep any valuables, jewelry, piercings at home. Please bring any specially ordered equipment (sling, braces) if indicated.    Arrange for a responsible person to drive you to and from the hospital on the day of your surgery. Please confirm the visitor policy for the day of your procedure when you receive your phone call with an arrival time.     Call the surgeon's office with any new illnesses, exposures, or additional questions prior to surgery.    Please reference your “My Surgical Experience Booklet” for additional information to prepare for your upcoming surgery.

## 2024-03-13 ENCOUNTER — HOSPITAL ENCOUNTER (OUTPATIENT)
Facility: AMBULARY SURGERY CENTER | Age: 64
Setting detail: OUTPATIENT SURGERY
Discharge: HOME | End: 2024-03-13
Attending: PLASTIC SURGERY | Admitting: PLASTIC SURGERY
Payer: COMMERCIAL

## 2024-03-13 ENCOUNTER — ANESTHESIA (OUTPATIENT)
Dept: PERIOP | Facility: AMBULARY SURGERY CENTER | Age: 64
End: 2024-03-13
Payer: COMMERCIAL

## 2024-03-13 VITALS
HEIGHT: 65 IN | DIASTOLIC BLOOD PRESSURE: 69 MMHG | HEART RATE: 86 BPM | WEIGHT: 122 LBS | TEMPERATURE: 98 F | OXYGEN SATURATION: 97 % | SYSTOLIC BLOOD PRESSURE: 135 MMHG | RESPIRATION RATE: 18 BRPM | BODY MASS INDEX: 20.33 KG/M2

## 2024-03-13 DIAGNOSIS — Z17.1 MALIGNANT NEOPLASM OF CENTRAL PORTION OF RIGHT BREAST IN FEMALE, ESTROGEN RECEPTOR NEGATIVE (HCC): ICD-10-CM

## 2024-03-13 DIAGNOSIS — Z85.3 PERSONAL HISTORY OF BREAST CANCER: ICD-10-CM

## 2024-03-13 DIAGNOSIS — C50.111 MALIGNANT NEOPLASM OF CENTRAL PORTION OF RIGHT BREAST IN FEMALE, ESTROGEN RECEPTOR NEGATIVE (HCC): ICD-10-CM

## 2024-03-13 PROBLEM — Z98.890 S/P BREAST RECONSTRUCTION, BILATERAL: Status: ACTIVE | Noted: 2024-03-13

## 2024-03-13 PROCEDURE — 15771 GRFG AUTOL FAT LIPO 50 CC/<: CPT | Performed by: PLASTIC SURGERY

## 2024-03-13 PROCEDURE — 15772 GRFG AUTOL FAT LIPO EA ADDL: CPT

## 2024-03-13 PROCEDURE — 99024 POSTOP FOLLOW-UP VISIT: CPT | Performed by: PLASTIC SURGERY

## 2024-03-13 PROCEDURE — 19316 MASTOPEXY: CPT | Performed by: PLASTIC SURGERY

## 2024-03-13 PROCEDURE — 15771 GRFG AUTOL FAT LIPO 50 CC/<: CPT

## 2024-03-13 PROCEDURE — 15772 GRFG AUTOL FAT LIPO EA ADDL: CPT | Performed by: PLASTIC SURGERY

## 2024-03-13 PROCEDURE — 19316 MASTOPEXY: CPT

## 2024-03-13 PROCEDURE — 88302 TISSUE EXAM BY PATHOLOGIST: CPT | Performed by: PATHOLOGY

## 2024-03-13 PROCEDURE — 88342 IMHCHEM/IMCYTCHM 1ST ANTB: CPT | Performed by: PATHOLOGY

## 2024-03-13 PROCEDURE — C9290 INJ, BUPIVACAINE LIPOSOME: HCPCS

## 2024-03-13 RX ORDER — HYDROMORPHONE HCL/PF 1 MG/ML
0.5 SYRINGE (ML) INJECTION
Status: DISCONTINUED | OUTPATIENT
Start: 2024-03-13 | End: 2024-03-13 | Stop reason: HOSPADM

## 2024-03-13 RX ORDER — EPHEDRINE SULFATE 50 MG/ML
INJECTION INTRAVENOUS AS NEEDED
Status: DISCONTINUED | OUTPATIENT
Start: 2024-03-13 | End: 2024-03-13

## 2024-03-13 RX ORDER — ACETAMINOPHEN 325 MG/1
975 TABLET ORAL EVERY 6 HOURS PRN
Status: DISCONTINUED | OUTPATIENT
Start: 2024-03-13 | End: 2024-03-13 | Stop reason: HOSPADM

## 2024-03-13 RX ORDER — BUPIVACAINE HYDROCHLORIDE 2.5 MG/ML
INJECTION, SOLUTION EPIDURAL; INFILTRATION; INTRACAUDAL AS NEEDED
Status: DISCONTINUED | OUTPATIENT
Start: 2024-03-13 | End: 2024-03-13 | Stop reason: HOSPADM

## 2024-03-13 RX ORDER — MAGNESIUM HYDROXIDE 1200 MG/15ML
LIQUID ORAL AS NEEDED
Status: DISCONTINUED | OUTPATIENT
Start: 2024-03-13 | End: 2024-03-13 | Stop reason: HOSPADM

## 2024-03-13 RX ORDER — ONDANSETRON 2 MG/ML
INJECTION INTRAMUSCULAR; INTRAVENOUS AS NEEDED
Status: DISCONTINUED | OUTPATIENT
Start: 2024-03-13 | End: 2024-03-13

## 2024-03-13 RX ORDER — OXYCODONE HYDROCHLORIDE 5 MG/1
5 TABLET ORAL EVERY 4 HOURS PRN
Status: DISCONTINUED | OUTPATIENT
Start: 2024-03-13 | End: 2024-03-13 | Stop reason: HOSPADM

## 2024-03-13 RX ORDER — ONDANSETRON 2 MG/ML
4 INJECTION INTRAMUSCULAR; INTRAVENOUS ONCE AS NEEDED
Status: DISCONTINUED | OUTPATIENT
Start: 2024-03-13 | End: 2024-03-13 | Stop reason: HOSPADM

## 2024-03-13 RX ORDER — DEXAMETHASONE SODIUM PHOSPHATE 10 MG/ML
INJECTION, SOLUTION INTRAMUSCULAR; INTRAVENOUS AS NEEDED
Status: DISCONTINUED | OUTPATIENT
Start: 2024-03-13 | End: 2024-03-13

## 2024-03-13 RX ORDER — LIDOCAINE HYDROCHLORIDE 10 MG/ML
0.5 INJECTION, SOLUTION EPIDURAL; INFILTRATION; INTRACAUDAL; PERINEURAL ONCE AS NEEDED
Status: DISCONTINUED | OUTPATIENT
Start: 2024-03-13 | End: 2024-03-13 | Stop reason: HOSPADM

## 2024-03-13 RX ORDER — LIDOCAINE HYDROCHLORIDE 10 MG/ML
INJECTION, SOLUTION EPIDURAL; INFILTRATION; INTRACAUDAL; PERINEURAL AS NEEDED
Status: DISCONTINUED | OUTPATIENT
Start: 2024-03-13 | End: 2024-03-13

## 2024-03-13 RX ORDER — PROPOFOL 10 MG/ML
INJECTION, EMULSION INTRAVENOUS AS NEEDED
Status: DISCONTINUED | OUTPATIENT
Start: 2024-03-13 | End: 2024-03-13

## 2024-03-13 RX ORDER — FENTANYL CITRATE 50 UG/ML
INJECTION, SOLUTION INTRAMUSCULAR; INTRAVENOUS AS NEEDED
Status: DISCONTINUED | OUTPATIENT
Start: 2024-03-13 | End: 2024-03-13

## 2024-03-13 RX ORDER — DIPHENHYDRAMINE HYDROCHLORIDE 50 MG/ML
12.5 INJECTION INTRAMUSCULAR; INTRAVENOUS ONCE AS NEEDED
Status: DISCONTINUED | OUTPATIENT
Start: 2024-03-13 | End: 2024-03-13 | Stop reason: HOSPADM

## 2024-03-13 RX ORDER — SODIUM CHLORIDE, SODIUM LACTATE, POTASSIUM CHLORIDE, CALCIUM CHLORIDE 600; 310; 30; 20 MG/100ML; MG/100ML; MG/100ML; MG/100ML
125 INJECTION, SOLUTION INTRAVENOUS CONTINUOUS
Status: DISCONTINUED | OUTPATIENT
Start: 2024-03-13 | End: 2024-03-13 | Stop reason: HOSPADM

## 2024-03-13 RX ORDER — SODIUM CHLORIDE, SODIUM LACTATE, POTASSIUM CHLORIDE, CALCIUM CHLORIDE 600; 310; 30; 20 MG/100ML; MG/100ML; MG/100ML; MG/100ML
INJECTION, SOLUTION INTRAVENOUS CONTINUOUS PRN
Status: DISCONTINUED | OUTPATIENT
Start: 2024-03-13 | End: 2024-03-13

## 2024-03-13 RX ORDER — GABAPENTIN 100 MG/1
100 CAPSULE ORAL ONCE
Status: COMPLETED | OUTPATIENT
Start: 2024-03-13 | End: 2024-03-13

## 2024-03-13 RX ORDER — MINERAL OIL
OIL (ML) MISCELLANEOUS AS NEEDED
Status: DISCONTINUED | OUTPATIENT
Start: 2024-03-13 | End: 2024-03-13 | Stop reason: HOSPADM

## 2024-03-13 RX ORDER — MIDAZOLAM HYDROCHLORIDE 2 MG/2ML
INJECTION, SOLUTION INTRAMUSCULAR; INTRAVENOUS AS NEEDED
Status: DISCONTINUED | OUTPATIENT
Start: 2024-03-13 | End: 2024-03-13

## 2024-03-13 RX ORDER — ACETAMINOPHEN 325 MG/1
975 TABLET ORAL ONCE
Status: COMPLETED | OUTPATIENT
Start: 2024-03-13 | End: 2024-03-13

## 2024-03-13 RX ORDER — FENTANYL CITRATE/PF 50 MCG/ML
25 SYRINGE (ML) INJECTION
Status: DISCONTINUED | OUTPATIENT
Start: 2024-03-13 | End: 2024-03-13 | Stop reason: HOSPADM

## 2024-03-13 RX ORDER — CEFAZOLIN SODIUM 1 G/50ML
1000 SOLUTION INTRAVENOUS ONCE
Status: COMPLETED | OUTPATIENT
Start: 2024-03-13 | End: 2024-03-13

## 2024-03-13 RX ORDER — ROCURONIUM BROMIDE 10 MG/ML
INJECTION, SOLUTION INTRAVENOUS AS NEEDED
Status: DISCONTINUED | OUTPATIENT
Start: 2024-03-13 | End: 2024-03-13

## 2024-03-13 RX ADMIN — CEFAZOLIN SODIUM 1000 MG: 1 SOLUTION INTRAVENOUS at 12:50

## 2024-03-13 RX ADMIN — ROCURONIUM 10 MG: 50 INJECTION, SOLUTION INTRAVENOUS at 13:35

## 2024-03-13 RX ADMIN — DEXAMETHASONE SODIUM PHOSPHATE 8 MG: 10 INJECTION INTRAMUSCULAR; INTRAVENOUS at 12:43

## 2024-03-13 RX ADMIN — ACETAMINOPHEN 975 MG: 325 TABLET, FILM COATED ORAL at 10:30

## 2024-03-13 RX ADMIN — LIDOCAINE HYDROCHLORIDE 50 MG: 10 INJECTION, SOLUTION EPIDURAL; INFILTRATION; INTRACAUDAL; PERINEURAL at 12:42

## 2024-03-13 RX ADMIN — PROPOFOL 150 MG: 10 INJECTION, EMULSION INTRAVENOUS at 12:42

## 2024-03-13 RX ADMIN — FENTANYL CITRATE 50 MCG: 50 INJECTION INTRAMUSCULAR; INTRAVENOUS at 12:42

## 2024-03-13 RX ADMIN — SUGAMMADEX 100 MG: 100 INJECTION, SOLUTION INTRAVENOUS at 15:01

## 2024-03-13 RX ADMIN — ONDANSETRON 4 MG: 2 INJECTION INTRAMUSCULAR; INTRAVENOUS at 14:54

## 2024-03-13 RX ADMIN — FENTANYL CITRATE 25 MCG: 50 INJECTION INTRAMUSCULAR; INTRAVENOUS at 15:44

## 2024-03-13 RX ADMIN — GABAPENTIN 100 MG: 100 CAPSULE ORAL at 10:30

## 2024-03-13 RX ADMIN — EPHEDRINE SULFATE 10 MG: 50 INJECTION INTRAVENOUS at 12:57

## 2024-03-13 RX ADMIN — ROCURONIUM 50 MG: 50 INJECTION, SOLUTION INTRAVENOUS at 12:43

## 2024-03-13 RX ADMIN — ROCURONIUM 10 MG: 50 INJECTION, SOLUTION INTRAVENOUS at 14:00

## 2024-03-13 RX ADMIN — SODIUM CHLORIDE, SODIUM LACTATE, POTASSIUM CHLORIDE, AND CALCIUM CHLORIDE: .6; .31; .03; .02 INJECTION, SOLUTION INTRAVENOUS at 15:06

## 2024-03-13 RX ADMIN — FENTANYL CITRATE 25 MCG: 50 INJECTION INTRAMUSCULAR; INTRAVENOUS at 15:34

## 2024-03-13 RX ADMIN — FENTANYL CITRATE 25 MCG: 50 INJECTION INTRAMUSCULAR; INTRAVENOUS at 16:00

## 2024-03-13 RX ADMIN — SODIUM CHLORIDE, SODIUM LACTATE, POTASSIUM CHLORIDE, AND CALCIUM CHLORIDE: .6; .31; .03; .02 INJECTION, SOLUTION INTRAVENOUS at 12:38

## 2024-03-13 RX ADMIN — MIDAZOLAM HYDROCHLORIDE 2 MG: 1 INJECTION, SOLUTION INTRAMUSCULAR; INTRAVENOUS at 12:38

## 2024-03-13 RX ADMIN — FENTANYL CITRATE 50 MCG: 50 INJECTION INTRAMUSCULAR; INTRAVENOUS at 14:38

## 2024-03-13 NOTE — H&P
H&P reviewed. After examining the patient I find no changes in the patients condition since the H&P had been written.    Vitals:    03/13/24 1012   BP: 137/65   Pulse: 86   Resp: 18   Temp: 97.5 °F (36.4 °C)   SpO2: 100%

## 2024-03-13 NOTE — DISCHARGE INSTR - AVS FIRST PAGE
Post-Op Discharge Instructions  Dr. Zandra Diamond  [facility]astic and Reconstructive Surgery  [address] [place] [address], [facility] [address], [place] [address]  [phone]     You may bathe in 24 hours.    Your dressings should stay in place until seen in the office. Please do not get dressings wet or submerge them in water until seen in the office  Keep IRENE dressings on until seen by Dr. Diamond  If the light is blinking green, the battery pack is functioning properly  If the light blinks orange, hit the orange button to re-establish the seal, this will usually correct the problem, if the light continues to blink orange, the dressing will still continue to function  Call the office if the dressing becomes completely saturated    Keep surgical bra on at all times, except to shower.  It is ok to remove bra while laying down at rest for short intervals for comfort  After 2 weeks, it is ok to switch to a sports bra.  It is recommended to use one that meagan in the front.  No underwire bra for 6 weeks.  Use ABD pads or a form of padding for extra compression.    4. No strenuous activity or lifting over 10 lbs for a minimum of 4 weeks. No repetitive pushing, pulling or overhead arm motions.    5. Please wear abdominal binder or compression shorts to decrease swelling. Sleep with feet elevated for swelling control.    6. Please lay on your back, with your head at an incline.     7. Your first post-op appointment is scheduled for 3/21/2024 @ 1:30PM at the [facility].    8. Take following medications with a checkmark ([x]) as prescribed, and do not take any pain medication on an empty stomach.  [x]Tylenol 975mg, every 6 hours for pain control.  [x]Oxycodone, 5mg, 1 tablet every 4 hours, as needed for severe/break-through  pain.  [x]Lovenox, 1 injection daily.  [x]Colace, 100mg, three times a day, as needed for constipation  [x]Senna, 2 tablets at bedtime, as needed for constipation  [x]Zofran, 4mg, 1 tablet as needed for  nausea/vomiting.  Please do not take any vitamins, minerals, ibuprofen, hormonal drugs or immunologic drugs for 7 days post-op.    9. Resume any prior medications at home unless otherwise instructed by Dr. Diamond    10. You must be off all pain medications and have a clear field of vision before driving.    11. For the next 24 hours:  a. Do not sign any legal documents or operate machinery.  b. Have a responsible adult help you.  c. Take it easy & rest.    12. Call Dr. Diamond at the office [phone] if any:   a. Obvious bleeding, excessive swelling, or warmth at the site  b. Fever over 101.0°  c. Shortness of breath, severe calf or thigh pain.  d. Redness, odor, or pus at the wound. (Some oozing is normal from incision sites and may continue for several days)  e. Persistent vomiting or pain that is not relieved by your medication.

## 2024-03-13 NOTE — OP NOTE
OPERATIVE REPORT  PATIENT NAME: Emily Hernandez    :  1960  MRN: [id number]  Pt Location: [facility] 01    SURGERY DATE: 3/13/2024    Surgeons and Role:     * Zandra Diamond MD - Primary     * Joan Velazco PA-C - Assisting    Preop Diagnosis:  Malignant neoplasm of central portion of right breast in female, estrogen receptor negative  [C50.111, Z17.1]  Personal history of breast cancer [Z85.3]    Post-Op Diagnosis Codes:     * Malignant neoplasm of central portion of right breast in female, estrogen receptor negative  [C50.111, Z17.1]     * Personal history of breast cancer [Z85.3]    Procedure(s):  Right - FAT GRAFTING TO RIGHT BREAST.  Left - LEFT MASTOPEXY BREAST FOR SYMMETRY. MEDIPORT REMOVAL    Specimen(s):  ID Type Source Tests Collected by Time Destination   1 : Left breast skin and tissue. Tissue Breast, Left TISSUE EXAM Zandra Diamond MD 3/13/2024 1324        Estimated Blood Loss:   Minimal    Drains:  * No LDAs found *    Anesthesia Type:   General    Operative Indications:  Malignant neoplasm of central portion of right breast in female, estrogen receptor negative  [C50.111, Z17.1]  Personal history of breast cancer [Z85.3]  Acquired breast asymmetry    Operative Findings:  Right breast fat graftincc    Complications:   None    Procedure and Technique:  Right breast reconstruction with autologous fat grafting, 125cc.  Left breast mastopexy for symmetry.  Left pectoralis I/II blocks with Exparel  Application of IRENE NPWT dressing, 200cm2, Non-DME       Patient was identified in preoperative holding area and preoperative markings are made.  Patient was then transferred operating room and placed supine on the operating table.  Venodyne boots were placed and activated.  All pressure points were appropriately and extensively padded.  After induction of general endotracheal anesthesia, entire anterior chest wall abdomen and flanks/hips were sterilely prepped and draped this  ChloraPrep in the usual fashion.  First attention focused on harvesting the autologous fat grafts. The Lipografter system was brought onto the field.  Wetting solution containing 1 L of normal saline with 1 ampule of epinephrine [id number] was then injected over the hips, flanks, outer thighs.  A total of 2 L was injected.  After allowing time for hemostasis, the fat was harvested using the Lipo-grafter pressurized syringe and fat harvesting cannula.  The fat was allowed to decant via gravity and excess fluid was removed.  The fat grafts were then injected over the right breast areas using the linear threading technique. Areas injected included under the areolar scar, medially,  and circumferentially around the breast to improve the size, shape, volume of each breast.  A total of 125cc of fat was injected on the right.  All incisions where then closed with 4-0 Monocryl.       Next, attention focused on the left breast where the nipple-areolar complex was sized at 38 mm with a cookie cutter.  A superior pedicle was then de-epithelialized along the preoperative markings.  The superiorly based central pedicle was then developed by incising down to the chest wall and isolating the pedicle from the medial and lateral pillars.  The left breast skin was removed and sent to pathology as a specimen.  The left breast was then copiously irrigated normal saline hemostasis obtained electrocautery.  A left-sided pectoralis 1 and 2 block was then performed with Exparel 20 cc diluted with Marcaine 0.25% 20 cc.  A total of 20 cc was injected along the lateral border the pectoralis major and minor muscles and along the lateral border of serratus.  Next the skin flaps were then redraped over the pedicle and the vertical and transverse incisions were then tailor tacked closed with skin staples.    The patient was then placed into the seated position to assess for size and symmetry.  Good symmetry was obtained.  The nipple-areolar complex  position was then marked out with a marking pen.  Patient was then placed back into the supine position.  The left breast were then closed in multiple layers.  The vertical and transverse incisions were closed in layered fashion.  First a 4-0 Monocryl Stratafix was used to close the deep dermis in a running fashion.  The skin was then closed in running fashion using a 4-0 Monocryl Stratafix in a running subcuticular closure.  The nipple-areolar complex was then brought out and sutured in place in 8 quadrants with 3-0 Monocryl in a buried deep dermal fashion.  The skin of the nipple areolar complex was then reapproximated to the breast skin using a 5-0 Monocryl in running fashion.  The bilateral breasts were then cleansed and dried.  Next a bebo [id number] negative pressure wound therapy dressing was then placed over the lower midline of the left breast to aid in wound healing.  The adhesive dressing was applied and the bebo was placed on negative pressure 125 mmHg and a good seal was obtained.  The nipple-areolar complex was then dressed with Telfa.  ABD pads and a surgical bra were placed.  The patient tolerated the procedure well.  There were no complications.  She was awakened from anesthesia and transferred to recovery room awake and in stable condition.  There was no qualified resident available for the case. A physician assistant was required during the procedure for retraction tissue handling,dissection and suturing. I was present for the entire procedure      Patient Disposition:  PACU         SIGNATURE: Zandra Diamond MD  DATE: March 13, 2024  TIME: 2:57 PM

## 2024-03-13 NOTE — ANESTHESIA POSTPROCEDURE EVALUATION
Post-Op Assessment Note    CV Status:  Stable  Pain Score: 0    Pain management: adequate       Mental Status:  Awake   Hydration Status:  Euvolemic   PONV Controlled:  Controlled   Airway Patency:  Patent     Post Op Vitals Reviewed: Yes    No anethesia notable event occurred.    Staff: CRNA               BP   108/71   Temp   97   Pulse  90   Resp   12   SpO2   100%

## 2024-03-13 NOTE — ANESTHESIA PREPROCEDURE EVALUATION
Procedure:  FAT GRAFTING TO RIGHT BREAST. 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 Wall thickness is normal. The left ventricular ejection fraction is 55%. Systolic function is normal. Global longitudinal strain is normal at -20%. Wall motion is normal. Diastolic function is normal.    Right Ventricle: Right ventricular cavity size is normal. Systolic function is normal.    Prior TTE study available for comparison. Prior study date: 7/25/2023. No significant changes noted compared to the prior study.     Physical Exam    Airway    Mallampati score: III  TM Distance: >3 FB  Neck ROM: full     Dental       Cardiovascular  Cardiovascular exam normal    Pulmonary  Pulmonary exam normal     Other Findings  post-pubertal.      Anesthesia Plan  ASA Score- 3     Anesthesia Type- general with ASA Monitors.         Additional Monitors:     Airway Plan: ETT and LMA.           Plan Factors-    Chart reviewed. EKG reviewed. Imaging results reviewed. Existing labs reviewed. Patient summary reviewed.    Patient is not a current smoker.              Induction- intravenous.    Postoperative Plan- Plan for postoperative opioid use. Planned trial extubation    Informed Consent- Anesthetic plan and risks discussed with patient.  I personally reviewed this  patient with the CRNA. Discussed and agreed on the Anesthesia Plan with the CRNA..

## 2024-03-14 NOTE — TELEPHONE ENCOUNTER
Patient had some clinical questions in regards to her after care instructions. I advised as instructed on AVS she should keep bebo dressing covered when taking a shower and try not to get I wet.    As far as the surgical bra she was advised she could remove it when showering but place it back on and must wear it at all times until seen in office and can use a sports bar when cleaning  the surgical bra.    She also wasn't sure if it was okay to start taking the levonox medication. Per patient she was told yes at the hospital by a nurse but wanted to confirm with Dr. Diamond. [phone]

## 2024-03-19 ENCOUNTER — TELEPHONE (OUTPATIENT)
Age: 64
End: 2024-03-19

## 2024-03-19 ENCOUNTER — TELEPHONE (OUTPATIENT)
Dept: PLASTIC SURGERY | Facility: CLINIC | Age: 64
End: 2024-03-19

## 2024-03-19 PROCEDURE — 88342 IMHCHEM/IMCYTCHM 1ST ANTB: CPT | Performed by: PATHOLOGY

## 2024-03-19 PROCEDURE — 88302 TISSUE EXAM BY PATHOLOGIST: CPT | Performed by: PATHOLOGY

## 2024-03-19 NOTE — TELEPHONE ENCOUNTER
Patient called stating she had surgery on 3/13 and the same day she came home she noticed bruising on each side above and below the love handles. Please advise [phone]

## 2024-03-19 NOTE — TELEPHONE ENCOUNTER
Returned patient's call and let her know I will pass her message on to someone on Dr. Diamond's Team to call back regarding recent surgery.  Forwarded message to Tracey.

## 2024-03-21 ENCOUNTER — OFFICE VISIT (OUTPATIENT)
Dept: PLASTIC SURGERY | Facility: CLINIC | Age: 64
End: 2024-03-21

## 2024-03-21 DIAGNOSIS — N64.89 BREAST ASYMMETRY IN FEMALE: Primary | ICD-10-CM

## 2024-03-21 DIAGNOSIS — Z92.3 HISTORY OF RADIATION THERAPY: ICD-10-CM

## 2024-03-21 DIAGNOSIS — Z85.3 HISTORY OF RIGHT BREAST CANCER: ICD-10-CM

## 2024-03-21 PROCEDURE — 99024 POSTOP FOLLOW-UP VISIT: CPT

## 2024-03-21 NOTE — PROGRESS NOTES
[facility]astic and [facility]  [address], [address], [place] [address]  [phone]    Patient Identification: Emily Hernandez is a 64 y.o. female     History of Present Illness: The patient is a 64 y.o.  year-old female  who presents to the office for post-op visit. Patient is 8 days s/p Fat Grafting To Right Breast. - Right and Left Mastopexy Breast For Symmetry - Left  on 3/13/2024 by Dr. Diamond.  Patient is accompanied at today's visit. She is wearing bra at all times and following restrictions. IRENE in place. Pt admits to not wearing abdominal binder to to discomfort. Patient denies fevers, chills, signs of infection or significant pain. States she has bruising to bilateral lateral thighs.   Patient has no complaints at this time.    Past Medical History:   Diagnosis Date    Anemia March 28,2022    Due to infusion therapy?    Asthma 2018 2018 to Jan 2021, i was diagnosed with asthma, jan 2021, i did nit have asthma according to the breathing test the asthma doctor performed, i get allergiy shot every 2 weeks for 2 different types of molds    Cancer (HCC)     breast    Diarrhea     occasional    Port-A-Cath in place     R side chest          Review of Systems  Constitutional: Denies fevers, chills or pain.  Skin: Denies any warmth, erythema, edema or mucopurulent drainage. Bruising    Physical Exam    Breast: IRENE removed. Surgical incisions are clean, dry, and intact. Skin perfusion is intact. Expected amount of post-operative edema. There are no signs of infection, obvious hematoma, seroma or wound dehiscence.    Hips: Bruising bilateral hips and lateral thighs. Post-op edema    Assessment and Plan:  The patient is an 64 y.o.  year-old female who presents to the office for post-op edema. Patient is 8 days s/p Fat Grafting To Right Breast. - Right and Left Mastopexy Breast For Symmetry - Left  on 3/13/2024 by Dr. Diamond    -At today's visit IRENE removed. Breast incision sites  healing well. Bruising at lateral thighs and hips.  -Patient is to wear abdominal binder and bra at all times to decrease swelling.  -Continue wearing surgical compression bra at all times. Patient is to refrain from exercise/repetitive arm movements for 4-6 weeks post-op.  -Apply bacitracin to the incision sites daily.  -The patient is to return in 1 week for suture removal.  -The patient is to call the office with any questions or concerns. All of the patient's questions were answered at this time and they agree with the plan of care.      Jaon Velazco PA-C  [facility] Plastic and Reconstructive Surgery

## 2024-03-22 NOTE — TELEPHONE ENCOUNTER
Patient requested if possible to get a reference of which bras she should purchase in [employer]. She said she forgot to take information from  after her post op visit yesterday.    Photo of bras could be sent to her via [employer] with amaz[employer] link.

## 2024-03-28 ENCOUNTER — OFFICE VISIT (OUTPATIENT)
Dept: PLASTIC SURGERY | Facility: CLINIC | Age: 64
End: 2024-03-28

## 2024-03-28 DIAGNOSIS — T38.6X5A OSTEOPOROSIS DUE TO AROMATASE INHIBITOR: Primary | ICD-10-CM

## 2024-03-28 DIAGNOSIS — Z85.3 HISTORY OF RIGHT BREAST CANCER: ICD-10-CM

## 2024-03-28 DIAGNOSIS — Z98.890 S/P BREAST RECONSTRUCTION, LEFT: ICD-10-CM

## 2024-03-28 DIAGNOSIS — Z92.3 HISTORY OF RADIATION THERAPY: ICD-10-CM

## 2024-03-28 DIAGNOSIS — M81.8 OSTEOPOROSIS DUE TO AROMATASE INHIBITOR: Primary | ICD-10-CM

## 2024-03-28 DIAGNOSIS — N64.89 BREAST ASYMMETRY IN FEMALE: Primary | ICD-10-CM

## 2024-03-28 PROCEDURE — 99024 POSTOP FOLLOW-UP VISIT: CPT

## 2024-03-28 NOTE — PROGRESS NOTES
[facility]astic [facility]  [address], [address], [place] [address]  [phone]    Patient Identification: Emily Hernandez is a 64 y.o. female     History of Present Illness: The patient is a 64 y.o.  year-old female  who presents to the office for post-op visit. Patient is 15 days s/p Fat Grafting To Right Breast. - Right and Left Mastopexy Breast For Symmetry - Left  on 3/13/2024 by Dr. Diamond.  Patient is accompanied at today's visit. She is wearing bra at all times and following restrictions.  Patient denies fevers, chills, signs of infection or significant pain. Applying bacitracin to incision sites daily.  Patient has no complaints at this time.    Past Medical History:   Diagnosis Date    Anemia March 28,2022    Due to infusion therapy?    Asthma 2018 2018 to Jan 2021, i was diagnosed with asthma, jan 2021, i did nit have asthma according to the breathing test the asthma doctor performed, i get allergiy shot every 2 weeks for 2 different types of molds    Cancer (HCC)     breast    Diarrhea     occasional    Port-A-Cath in place     R side chest          Review of Systems  Constitutional: Denies fevers, chills or pain.  Skin: Denies any warmth, erythema, edema or mucopurulent drainage. Bruising    Physical Exam    Breast: Surgical incisions are clean, dry, and intact. Sutures removed. Skin perfusion is intact. Expected amount of post-operative edema. There are no signs of infection, obvious hematoma, seroma or wound dehiscence.    Hips: Bruising bilateral hips and lateral thighs. Sutures removed.     Assessment and Plan:  The patient is an 64 y.o.  year-old female who presents to the office for post-op edema. Patient is 15 days s/p Fat Grafting To Right Breast. - Right and Left Mastopexy Breast For Symmetry - Left  on 3/13/2024 by Dr. Diamond    -Breast incision sites healing well. Sutures removed. Patient tolerated well.  -Patient is to wear abdominal binder and bra at all  times to decrease swelling.  -Continue wearing surgical compression bra at all times. Patient is to refrain from exercise/repetitive arm movements for 4-6 weeks post-op.  -Apply Aquaphor to incision sites daily. May begin scar cream at 3 weeks post-op.   -The patient is to return in 2-3 weeks for 1 month post-op visit.  -The patient is to call the office with any questions or concerns. All of the patient's questions were answered at this time and they agree with the plan of care.      Joan Velazco PA-C  [facility] Plastic and Reconstructive Surgery

## 2024-04-10 ENCOUNTER — OFFICE VISIT (OUTPATIENT)
Dept: HEMATOLOGY ONCOLOGY | Facility: CLINIC | Age: 64
End: 2024-04-10
Payer: COMMERCIAL

## 2024-04-10 ENCOUNTER — TELEPHONE (OUTPATIENT)
Dept: HEMATOLOGY ONCOLOGY | Facility: CLINIC | Age: 64
End: 2024-04-10

## 2024-04-10 VITALS
OXYGEN SATURATION: 98 % | WEIGHT: 126 LBS | DIASTOLIC BLOOD PRESSURE: 68 MMHG | BODY MASS INDEX: 20.99 KG/M2 | SYSTOLIC BLOOD PRESSURE: 124 MMHG | HEART RATE: 86 BPM | HEIGHT: 65 IN | TEMPERATURE: 98.2 F | RESPIRATION RATE: 18 BRPM

## 2024-04-10 DIAGNOSIS — I42.7 CHEMOTHERAPY INDUCED CARDIOMYOPATHY (HCC): ICD-10-CM

## 2024-04-10 DIAGNOSIS — Z17.0 MALIGNANT NEOPLASM OF CENTRAL PORTION OF RIGHT BREAST IN FEMALE, ESTROGEN RECEPTOR POSITIVE (HCC): Primary | ICD-10-CM

## 2024-04-10 DIAGNOSIS — C77.3 MALIGNANT NEOPLASM METASTATIC TO LYMPH NODE OF AXILLA (HCC): ICD-10-CM

## 2024-04-10 DIAGNOSIS — Z95.828 PORT-A-CATH IN PLACE: ICD-10-CM

## 2024-04-10 DIAGNOSIS — M81.8 OSTEOPOROSIS DUE TO AROMATASE INHIBITOR: ICD-10-CM

## 2024-04-10 DIAGNOSIS — C50.911 HER2-POSITIVE CARCINOMA OF RIGHT BREAST (HCC): ICD-10-CM

## 2024-04-10 DIAGNOSIS — T38.6X5A OSTEOPOROSIS DUE TO AROMATASE INHIBITOR: ICD-10-CM

## 2024-04-10 DIAGNOSIS — C50.111 MALIGNANT NEOPLASM OF CENTRAL PORTION OF RIGHT BREAST IN FEMALE, ESTROGEN RECEPTOR POSITIVE (HCC): Primary | ICD-10-CM

## 2024-04-10 DIAGNOSIS — T45.1X5A CHEMOTHERAPY INDUCED CARDIOMYOPATHY (HCC): ICD-10-CM

## 2024-04-10 PROCEDURE — 99214 OFFICE O/P EST MOD 30 MIN: CPT | Performed by: INTERNAL MEDICINE

## 2024-04-10 RX ORDER — LIDOCAINE AND PRILOCAINE 25; 25 MG/G; MG/G
CREAM TOPICAL
Qty: 30 G | Refills: 1 | Status: SHIPPED | OUTPATIENT
Start: 2024-04-10

## 2024-04-10 NOTE — PROGRESS NOTES
HPI: Continuation of care for breast cancer and patient is on adjuvant letrozole.  Patient is here with her .  In March 2022 patient was diagnosed to have  triple positive right breast cancer T2 NX.   Tumor mass was 2.5 cm  with negative axilla on ultrasound and negative distant metastatic disease.  Path  report was invasive mammary carcinoma, grade 2, ER 70-80% and [place] 70-80% and HER2 3+ positive.  Patient received neoadjuvant chemo immunotherapy and she received 6 cycles of TCHP and after that she had lumpectomy and sentinel lymph node sampling  on 09/30/2022 and there was residual 1.8 cm invasive cancer.  Margins were clear. All  8 sentinel lymph nodes were negative for metastatic disease.  Patient received radiation.  Patient had Herceptin plus Perjeta to complete 1 year of therapy and that was completed in April 2023.  Letrozole was started in November 2022 and she will take that for a total of 10 years.  She has osteopenia and she is taking calcium and vitamin D and is  on Prolia. Negative genetic testing for significant variant.  No history of cancers in her family.   Patient is less nervous and anxious now than before.  Has less tiredness.  She had CT scan and bone scan recently because occasionally she would have has right lower chest anteriorly.  Imaging studies did not show metastatic disease.  She states this pain has been there ever since she had surgery.    ROS:   Reviewed 12 systems: See symptoms in HPI  Presently no other neurological, cardiac, pulmonary, GI and  symptoms other than listed in HPI.  Other symptoms are in HPI.  No  fever, chills, bleeding, skin rash, weight loss, night sweats, arthritic symptoms,   weakness, numbness, claudication and gait problem. No frequent infections.  Not unusually sensitive to heat or cold. No swelling of the ankles. No swollen glands.  Patient is less anxious.      Physical Exam:     4/10/24 3/13/24 2/28/24   Blood Pressure 124/68 135/69 125/76   Pulse  "86 86 90   Respirations 18 18 --   Temperature 98.2 °F (36.8 °C) 98 °F (36.7 °C) 96 °F (35.6 °C) (Abnormal)     Temp Source -- -- Tympanic   SpO2 98 % 97 % --   Weight - Scale 57.2 kg (126 lb) 55.3 kg (122 lb) 55.8 kg (123 lb)   Height 5' 5\" (1.651 m) 5' 5\" (1.651 m) 5' 5\" (1.651 m)   Pain Score Two -- --   Pain Loc HAND [thumb] -- --     Patient is alert and oriented.  Patient is not in distress.  Vitals are above.  There is no icterus.  There is no oral thrush.  There is no palpable neck mass.  Lung fields are clear to percussion and auscultation.  Heart rate is regular.  There is no palpable abdominal mass.  Abdomen is soft and nontender.  There is no ascites.  There is no edema of the ankles.  There is no calf tenderness.  There is  No focal neurological deficit.  No skin rash.  Lymph nodes are not palpably enlarged in the neck and axillary areas.  No clubbing.   Patient is anxious.  Performance status 1.   No lymphedema.    Historical Information   Past Medical History:   Diagnosis Date   • Anemia March 28,2022    Due to infusion therapy?   • Asthma 2018 2018 to Jan 2021, i was diagnosed with asthma, jan 2021, i did nit have asthma according to the breathing test the asthma doctor performed, i get allergiy shot every 2 weeks for 2 different types of molds   • Cancer (HCC)     breast   • Diarrhea     occasional   • Port-A-Cath in place     R side chest     Past Surgical History:   Procedure Laterality Date   • BREAST BIOPSY Right 03/29/2022    [facility]   • BREAST LUMPECTOMY Right 09/30/2022    Procedure: ML BREAST;  Surgeon: Azalea Motta MD;  Location: [facility];  Service: Surgical Oncology   • CATARACT EXTRACTION Left 2019    with lens implant   • IR PORT PLACEMENT  04/20/2022   • LYMPH NODE BIOPSY Right 09/30/2022    Procedure: SLN BX;  Surgeon: Azalea Motta MD;  Location[facility];  Service: Surgical Oncology   • MANDIBLE SURGERY      jaw surgery for crooked jaw 1993, 1998   • CA GRAFTING OF AUTOLOGOUS " FAT BY LIPO 50 CC OR LESS Right 3/13/2024    Procedure: FAT GRAFTING TO RIGHT BREAST.;  Surgeon: Zandra Diamond MD;  [facility];  Service: Plastics   • SC MASTOPEXY Left 3/13/2024    Procedure: LEFT MASTOPEXY BREAST FOR SYMMETRY;  Surgeon: Zandra Diamond MD;  [facility];  Service: Plastics   • ROTATOR CUFF REPAIR Right 2020   • US BREAST CLIP NEEDLE LOC RIGHT Right 03/29/2022   • US GUIDED BREAST BIOPSY RIGHT COMPLETE Right 03/29/2022     Social History   Social History     Substance and Sexual Activity   Alcohol Use Not Currently     Social History     Substance and Sexual Activity   Drug Use Never     Social History     Tobacco Use   Smoking Status Never   • Passive exposure: Never   Smokeless Tobacco Never     Family History:   Family History   Problem Relation Age of Onset   • Prostate cancer Father    • Asthma Father         He has had asthma previously   • No Known Problems Sister    • Asthma Brother         Has had asthma previously   • No Known Problems Daughter    • No Known Problems Daughter    • Lung cancer Maternal Aunt    • No Known Problems Paternal Aunt    • No Known Problems Paternal Aunt    • No Known Problems Maternal Grandmother    • No Known Problems Maternal Grandfather    • Breast cancer Paternal Grandmother 80        I was told she had breast cancer in her 80’s.   • No Known Problems Paternal Grandfather    • Stomach cancer Other          Current Outpatient Medications:   •  albuterol (2.5 mg/3 mL) 0.083 % nebulizer solution, USE 1 VIAL IN NEBULIZER EVERY 4 HOURS AS NEEDED FOR WHEEZING, Disp: , Rfl:   •  albuterol (PROVENTIL HFA,VENTOLIN HFA) 90 mcg/act inhaler, , Disp: , Rfl:   •  cetirizine (ZyrTEC) 10 mg tablet, Take 10 mg by mouth if needed, Disp: , Rfl:   •  denosumab (Prolia) 60 mg/mL, as directed Subcutaneous, Disp: , Rfl:   •  EPINEPHrine (EPIPEN) 0.3 mg/0.3 mL SOAJ, , Disp: , Rfl:   •  letrozole (FEMARA) 2.5 mg tablet, Take 1 tablet by mouth once  daily, Disp: 30 tablet, Rfl: 5  •  lidocaine-prilocaine (EMLA) cream, Apply topically on the skin over the Port-A-Cath as advised, Disp: 30 g, Rfl: 1  •  loperamide (IMODIUM) 2 mg capsule, Take 2 mg by mouth 4 (four) times a day as needed for diarrhea, Disp: , Rfl:   •  sodium chloride (OCEAN) 0.65 % nasal spray, Saline Nasal Mist, Disp: , Rfl:   •  docusate sodium (COLACE) 100 mg capsule, Take 1 capsule (100 mg total) by mouth 3 (three) times a day as needed for constipation (Patient not taking: Reported on 4/10/2024), Disp: 40 capsule, Rfl: 1  •  enoxaparin (Lovenox) 40 mg/0.4 mL, Inject 0.4 mL (40 mg total) under the skin in the morning for 7 days Use right or left thigh. (Patient not taking: Reported on 4/10/2024), Disp: 2.8 mL, Rfl: 0  •  ondansetron (ZOFRAN-ODT) 4 mg disintegrating tablet, Take 1 tablet (4 mg total) by mouth every 6 (six) hours as needed for nausea or vomiting (Patient not taking: Reported on 4/10/2024), Disp: 20 tablet, Rfl: 0  •  oxyCODONE (Roxicodone) 5 immediate release tablet, Take 1 tablet (5 mg total) by mouth every 4 (four) hours as needed for moderate pain for up to 12 doses Max Daily Amount: 30 mg (Patient not taking: Reported on 4/10/2024), Disp: 12 tablet, Rfl: 0  •  senna (SENOKOT) 8.6 mg, Take 2 tablets (17.2 mg total) by mouth daily at bedtime (Patient not taking: Reported on 4/10/2024), Disp: 30 tablet, Rfl: 1    Allergies   Allergen Reactions   • Molds & Smuts Hives and Anaphylaxis     Sore throat  Sore throat     • Other Other (See Comments), Anaphylaxis and Hives     Mold/gets sore throat   • Nuts - Food Allergy Hives     BRAZILIAN NUTS                 Lab Results: I have reviewed all pertinent labs.  LABS:  Comprehensive metabolic panel  Status: Final result      Contains abnormal data Comprehensive metabolic panel  Order: [id number]   Status: Final result       Visible to patient: Yes (seen)       Next appt: 04/16/2024 at 02:00 PM in Obstetrics and Gynecology (Mattie  MD Nabil)       Dx: Malignant neoplasm of central portion...    0 Result Notes            Component  Ref Range & Units 3/6/24 12:23 PM 2/1/24  2:25 PM 1/18/24 12:13 PM 11/16/23 12:37 PM 8/24/23 12:45 PM 5/17/23  1:47 PM 4/5/23 12:47 PM   Sodium  135 - 147 mmol/L 137 138 137 138 137 138 138   Potassium  3.5 - 5.3 mmol/L 4.3 3.8 4.1 4.0 4.2 3.9 4.2   Chloride  96 - 108 mmol/L 104 102 105 103 101 103 105   CO2  21 - 32 mmol/L 28 30 26 28 29 29 27   ANION GAP  mmol/L 5 6 6 7 7 6 R 6 R   BUN  5 - 25 mg/dL 14 15 20 23 18 20 19   Creatinine  0.60 - 1.30 mg/dL 0.66 0.69 CM 0.69 CM 0.69 CM 0.73 CM 0.68 CM 0.74 CM   Comment: Standardized to IDMS reference method   Glucose  65 - 140 mg/dL 93  77 CM 92 CM 92 CM 94 CM 88 CM   Comment: If the patient is fasting, the ADA then defines impaired fasting glucose as > 100 mg/dL and diabetes as > or equal to 123 mg/dL.   Calcium  8.4 - 10.2 mg/dL 9.4 9.4 9.5 10.1 9.4 9.7 9.4   AST  13 - 39 U/L 20  18 17 20 19 18   ALT  7 - 52 U/L 13  13 CM 11 CM 20 CM 16 CM 14 CM   Comment: Specimen collection should occur prior to Sulfasalazine administration due to the potential for falsely depressed results.   Alkaline Phosphatase  34 - 104 U/L 33 Low   35 30 Low  36 38 36   Total Protein  6.4 - 8.4 g/dL 6.6  6.7 6.6 6.5 6.7 6.6   Albumin  3.5 - 5.0 g/dL 4.4  4.3 4.4 4.0 4.3 4.3   Total Bilirubin  0.20 - 1.00 mg/dL 0.50  0.43 CM 0.44 CM 0.44 CM 0.43 CM 0.55 CM   Comment: Use of this assay is not recommended for patients undergoing treatment with eltrombopag due to the potential for falsely elevated results.  N-acetyl-p-benzoquinone imine (metabolite of Acetaminophen) will generate erroneously low results in samples for patients that have taken an overdose of Acetaminophen.   eGFR  ml/min/1.73sq m 94 93 93 93 88 94 87                 CBC and differential  Status: Final result      Contains abnormal data CBC and differential  Order: [id number]   Status: Final result       Visible to patient: Yes  (seen)       Next appt: 04/16/2024 at 02:00 PM in Obstetrics and Gynecology (Mattie Ferrer MD)       Dx: Malignant neoplasm of central portion...    0 Result Notes            Component  Ref Range & Units 3/6/24 12:23 PM 2/1/24  2:25 PM 11/16/23 12:37 PM 11/16/23 12:37 PM 8/24/23 12:45 PM 5/17/23  1:47 PM 4/5/23 12:47 PM   WBC  4.31 - 10.16 Thousand/uL 3.66 Low  5.61  5.72 6.01 4.71 3.76 Low    RBC  3.81 - 5.12 Million/uL 4.07 4.26  4.16 4.30 4.00 3.86   Hemoglobin  11.5 - 15.4 g/dL 12.5 13.5  12.7 13.2 12.3 11.9   Hematocrit  34.8 - 46.1 % 38.7 40.8  39.4 41.8 38.1 36.9   MCV  82 - 98 fL 95 96  95 97 95 96   MCH  26.8 - 34.3 pg 30.7 31.7  30.5 30.7 30.8 30.8   MCHC  31.4 - 37.4 g/dL 32.3 33.1  32.2 31.6 32.3 32.2   RDW  11.6 - 15.1 % 12.0 12.1  12.1 12.4 12.4 12.6   MPV  8.9 - 12.7 fL 9.7 8.8 Low   9.5 9.0 9.3 9.5   Platelets  149 - 390 Thousands/uL 202 230  252 281 244 234   nRBC  /100 WBCs 0 0   0 0 0   Segmented %  43 - 75 % 51 58 45  61 60 60   Immature Grans %  0 - 2 % 0 0   0 0 0   Lymphocytes %  14 - 44 % 28 27 35  23 21 22   Monocytes %  4 - 12 % 9 7 7  10 7 8   Eosinophils Relative  0 - 6 % 10 High  7 High  10 High   5 10 High  8 High    Basophils Relative  0 - 1 % 2 High  1 3 High   1 2 High  2 High    Absolute Neutrophils  1.85 - 7.62 Thousands/µL 1.89 3.24 2.57 R  3.68 2.80 2.28   Absolute Immature Grans  0.00 - 0.20 Thousand/uL 0.00 0.01   0.02 0.01 0.00   Absolute Lymphocytes  0.60 - 4.47 Thousands/µL 1.02 1.51 2.00 R  1.39 1.00 0.81   Absolute Monocytes  0.17 - 1.22 Thousand/µL 0.31 0.39 0.40 R  0.59 0.34 0.31   Eosinophils Absolute  0.00 - 0.61 Thousand/µL 0.36 0.38 0.57 High  R  0.27 0.49 0.30   Basophils Absolute  0.00 - 0.10 Thousands/µL 0.08 0.08 0.17 High  R  0.06 0.07 0.06   Total Counted          RBC Morphology   Normal       Platelet Estimate   Adequate R                  Specimen Collected: 03/06/24 12:23 PM Last Resulted: 03/06/24  4:54 PM                     Component  Ref Range & Units  3/6/24 12:23 PM 11/16/23 12:37 PM 8/24/23 12:45 PM   CA 27-29  0.0 - 38.6 U/mL 14.5 12.9 CM 10.5 CM   Comment: Siemens Centaur Immunochemiluminometric Methodology (ICMA)              Results for orders placed or performed during the hospital encounter of 03/13/24   Tissue Exam   Result Value Ref Range    Case Report       Surgical Pathology Report                         Case: [id number]                                  Authorizing Provider:  Zandra Diamond MD       Collected:           03/13/2024 1324              Ordering Location:     [facility]        Received:            03/13/2024 1922                                     [facility]                                                    Pathologist:           Vicente Reddy MD                                                                 Specimen:    Breast, Left, Left breast skin and tissue.                                                 Final Diagnosis       A. Breast, Left, Left breast skin and tissue.:  - Potion of skin with seborrheic keratosis.  - Negative for malignancy.    Comment: Immunohistochemistry for pan cytokeratin AE1/AE3 highlights benign epithelial cells.      Note       Interpretation performed at [facility], [facility] [address]        Additional Information       All reported additional testing was performed with appropriately reactive controls.  These tests were developed and their performance characteristics determined by [facility] or appropriate performing facility, though some tests may be performed on tissues which have not been validated for performance characteristics (such as staining performed on alcohol exposed cell blocks and decalcified tissues).  Results should be interpreted with caution and in the context of the patients’ clinical condition. These tests may not be cleared or approved by the U.S. Food and Drug Administration, though the FDA has determined that  "such clearance or approval is not necessary. These tests are used for clinical purposes and they should not be regarded as investigational or for research. This laboratory has been approved by CLIA 88, designated as a high-complexity laboratory and is qualified to perform these tests.  .      Gross Description       A. The specimen is received in formalin, labeled with the patient's name and hospital number, and is designated \" left breast skin and tissue.\"  It consists of 3 irregular fragments of skin, measuring 6.5 x 4 x 0.3 cm in aggregate.  The skin is tan-white to tan-pink and wrinkled.  No skin lesions are identified.  No breast tissue is identified.  Representative sections are submitted in 1 cassette.    Note: The estimated total formalin fixation time based upon information provided by the submitting clinician and the standard processing schedule is under 72 hours.    MScheib           Imaging Studies: I have personally reviewed pertinent reports.    IMPRESSION:     1. Stable exam. No scintigraphic evidence of osseous metastasis.           Workstation performed: [id number]        Imaging    NM bone scan whole body (Order: [id number]) - 12/21    MPRESSION:     No new findings to suggest metastatic disease in the chest, abdomen, or pelvis.  New presumed post radiation treatment change in the anterior right upper lobe.           Workstation performed: [id number]        Imaging    CT chest abdomen pelvis w contrast (Order: [id number]) - 12/6/2023  .      Reviewed above test results and discussed with patient and her     Assessment and Plan:  See diagnoses, orders instructions below  Continuation of care for breast cancer and patient is on adjuvant letrozole.  Patient is here with her .  In March 2022 patient was diagnosed to have  triple positive right breast cancer T2 NX.   Tumor mass was 2.5 cm  with negative axilla on ultrasound and negative distant metastatic disease.  Path  report was " invasive mammary carcinoma, grade 2, ER 70-80% and NE 70-80% and HER2 3+ positive.  Patient received neoadjuvant chemo immunotherapy and she received 6 cycles of TCHP and after that she had lumpectomy and sentinel lymph node sampling  on 09/30/2022 and there was residual 1.8 cm invasive cancer.  Margins were clear. All  8 sentinel lymph nodes were negative for metastatic disease.  Patient received radiation.  Patient had Herceptin plus Perjeta to complete 1 year of therapy and that was completed in April 2023.  Letrozole was started in November 2022 and she will take that for a total of 10 years.  She has osteopenia and she is taking calcium and vitamin D and is  on Prolia. Negative genetic testing for significant variant.  No history of cancers in her family.   Patient is less nervous and anxious now than before.  Has less tiredness.  She had CT scan and bone scan recently because occasionally she would have has right lower chest anteriorly.  Imaging studies did not show metastatic disease.  She states this pain has been there ever since she had surgery.  Physical examination and test results are as recorded and discussed in detail.  Letrozole will be continued for a total of 10 years.  She will stay on  calcium with vitamin D and Prolia.   She will continue to have echocardiogram for now, once every 6 months.   Discussed all this with patient in detail.  Questions answered  Patient  has Port-A-Cath and she gets that flushed.  Discussed that single drug Kadcyla in the adjuvant setting was found to be better than Herceptin but not tested against Herceptin plus Perjeta and I do not think that will be tested.  Discussed importance of self-breast examination, eating healthy foods, staying active and health screening test.  Patient is capable of self-care.  Discussed precautions against coronavirus and flu and other infections.  Goal of therapy will be cure if possible   All discussed in very much detail.  Questions  answered.    1. Malignant neoplasm of central portion of right breast in female, estrogen receptor positive (HCC)    - CBC and differential; Standing  - Comprehensive metabolic panel; Standing  - Cancer antigen 27.29; Standing  - Echo follow up/limited w/ contrast if indicated; Future  - lidocaine-prilocaine (EMLA) cream; Apply topically on the skin over the Port-A-Cath as advised  Dispense: 30 g; Refill: 1    2. 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 HER2-positive carcinoma of right breast (HCC)    - Echo follow up/limited w/ contrast if indicated; Future    4. 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HCC)    - Echo follow up/limited w/ contrast if indicated; Future    5. Osteoporosis due to aromatase inhibitor      6. Port-A-Cath in place    - lidocaine-prilocaine (EMLA) cream; Apply topically on the skin over the Port-A-Cath as advised  Dispense: 30 g; Refill: 1      Ordered lower extremity 6 weeks.  Blood work every 12 weeks.  Echocardiogram in July 2024.  No change in letrozole and she takes vitamin D and calcium.  Follow-up in 3 months.         Patient will continue to follow with her primary physician and other consultants.  Patient voiced understanding and agrees     This note has been generated by voice recognition software.  Therefore there maybe spelling, grammar, and other errors.  Please contact if questions arise.    Counseling / Coordination of Care  ..  Provided counseling and support

## 2024-04-11 ENCOUNTER — HOSPITAL ENCOUNTER (OUTPATIENT)
Dept: INFUSION CENTER | Facility: CLINIC | Age: 64
Discharge: HOME | End: 2024-04-11
Payer: COMMERCIAL

## 2024-04-11 DIAGNOSIS — Z95.828 PORT-A-CATH IN PLACE: Primary | ICD-10-CM

## 2024-04-11 PROCEDURE — 96523 IRRIG DRUG DELIVERY DEVICE: CPT

## 2024-04-11 NOTE — PROGRESS NOTES
Pt presents for port flush. Port accessed, positive blood return noted, port flushed per protocol. Future appt scheduled for labs 6/6 at 12:00 for labs. Pt declined AVS.

## 2024-04-16 ENCOUNTER — ANNUAL EXAM (OUTPATIENT)
Dept: OBGYN CLINIC | Facility: MEDICAL CENTER | Age: 64
End: 2024-04-16
Payer: COMMERCIAL

## 2024-04-16 VITALS
HEIGHT: 65 IN | BODY MASS INDEX: 21.06 KG/M2 | DIASTOLIC BLOOD PRESSURE: 80 MMHG | SYSTOLIC BLOOD PRESSURE: 120 MMHG | WEIGHT: 126.4 LBS

## 2024-04-16 DIAGNOSIS — Z12.4 PAP SMEAR FOR CERVICAL CANCER SCREENING: ICD-10-CM

## 2024-04-16 DIAGNOSIS — Z85.3 PERSONAL HISTORY OF BREAST CANCER: ICD-10-CM

## 2024-04-16 DIAGNOSIS — N81.11 MIDLINE CYSTOCELE: ICD-10-CM

## 2024-04-16 DIAGNOSIS — Z01.419 ENCOUNTER FOR WELL WOMAN EXAM WITH ROUTINE GYNECOLOGICAL EXAM: Primary | ICD-10-CM

## 2024-04-16 PROCEDURE — 99386 PREV VISIT NEW AGE 40-64: CPT | Performed by: OBSTETRICS & GYNECOLOGY

## 2024-04-16 NOTE — PROGRESS NOTES
"[facility] [address], [place], [place]    ASSESSMENT/PLAN: Emily Hernandez is a 64 y.o.  who presents for annual gynecologic exam.    Encounter for routine gynecologic examination  - Routine well woman exam completed today.  - Cervical Cancer Screening: Current ASCCP Guidelines reviewed. Last Pap: 2022 . Next Pap Due:   - Breast Cancer Screening: Last Mammogram 2024,   - Colorectal cancer screening   - The following were reviewed in today's visit: breast self exam and mammography screening ordered    Additional problems addressed at this visit:  1. Encounter for well woman exam with routine gynecological exam    2. Pap smear for cervical cancer screening    3. Midline cystocele  -     Ambulatory Referral to Physical Therapy; Future    4. Personal history of breast cancer          CC:  Annual Gynecologic Examination    HPI: Emily Hernandez is a 64 y.o.  who presents for annual gynecologic examination.  Gynecologic Exam      Patient presents for routine annual exam.  She states she recently had breast surgery for reconstruction.  She is currently healing from this procedure.  She was also told she had prolapse.  She is interested in starting pelvic floor physical therapy.  She does report some overactive bladder symptoms but overall not bothersome.    The following portions of the patient's history were reviewed and updated as appropriate: allergies, current medications, past family history, past medical history, obstetric history, gynecologic history, past social history, past surgical history and problem list.    Review of Systems   Constitutional: Negative.    HENT: Negative.     Eyes: Negative.    Respiratory: Negative.     Cardiovascular: Negative.    Gastrointestinal: Negative.    Genitourinary: Negative.    Musculoskeletal: Negative.    All other systems reviewed and are negative.        Objective:  /80   Ht 5' 5\" (1.651 m)   Wt 57.3 " kg (126 lb 6.4 oz)   BMI 21.03 kg/m²    Physical Exam  Vitals reviewed.   Constitutional:       General: She is not in acute distress.     Appearance: She is well-developed.   HENT:      Head: Normocephalic and atraumatic.      Nose: Nose normal.   Cardiovascular:      Rate and Rhythm: Normal rate.   Pulmonary:      Effort: Pulmonary effort is normal. No respiratory distress.   Chest:   Breasts:     Breasts are symmetrical.      Right: Skin change present. No mass, nipple discharge or tenderness.      Left: Skin change present. No mass, nipple discharge or tenderness.       Abdominal:      General: There is no distension.      Palpations: Abdomen is soft. There is no mass.      Tenderness: There is no abdominal tenderness. There is no guarding or rebound.   Genitourinary:     General: Normal vulva.      Exam position: Lithotomy position.      Labia:         Right: No lesion.         Left: No lesion.       Urethra: No prolapse (urethral meatus normal).      Vagina: Prolapsed vaginal walls (cystocele) present. No vaginal discharge, erythema or bleeding.      Cervix: Normal.      Uterus: Normal.       Adnexa: Right adnexa normal and left adnexa normal.   Musculoskeletal:         General: Normal range of motion.      Cervical back: Normal range of motion.   Lymphadenopathy:      Upper Body:      Right upper body: No supraclavicular, axillary or pectoral adenopathy.      Left upper body: No supraclavicular, axillary or pectoral adenopathy.      Lower Body: No right inguinal adenopathy. No left inguinal adenopathy.   Skin:     General: Skin is warm and dry.   Neurological:      Mental Status: She is alert and oriented to person, place, and time.   Psychiatric:         Behavior: Behavior normal.         Thought Content: Thought content normal.         Judgment: Judgment normal.             Mattie Ferrer MD  [facility]  04/16/24 2:18 PM

## 2024-04-17 ENCOUNTER — TELEPHONE (OUTPATIENT)
Dept: HEMATOLOGY ONCOLOGY | Facility: CLINIC | Age: 64
End: 2024-04-17

## 2024-04-17 NOTE — TELEPHONE ENCOUNTER
Patient Call    Who are you speaking with? Patient    If it is not the patient, are they listed on an active communication consent form? N/A   What is the reason for this call? Pt is calling in regards to blood work that she has to have done every 3 months. Pt is asking how long she will need to have blood work done every 3 months and when can it switch over to every 6 months? Pt would like a call back regarding this please.    Does this require a call back? Yes   If a call back is required, please list [facility] [place] call back number [phone]   If a call back is required, advise that a message will be forwarded to their care team and someone will return their call as soon as possible.   Did you relay this information to the patient? Yes

## 2024-04-17 NOTE — TELEPHONE ENCOUNTER
Returned telephone call left voice message.  Dr Jason order the labs q 3 months however if you prefer to get them q 6 months that is ok. If you decide to have them drawn q 6 months have them drawn 1 week to a few days prior to your Prolia tx.  Any questions call me back on my direct line.

## 2024-04-17 NOTE — TELEPHONE ENCOUNTER
How often is she getting prolia? Needs labs before that.   If that is every 6 months, then every 6 months labs is fine.

## 2024-04-25 ENCOUNTER — OFFICE VISIT (OUTPATIENT)
Dept: PLASTIC SURGERY | Facility: CLINIC | Age: 64
End: 2024-04-25

## 2024-04-25 DIAGNOSIS — Z92.3 HISTORY OF RADIATION THERAPY: ICD-10-CM

## 2024-04-25 DIAGNOSIS — Z85.3 HISTORY OF RIGHT BREAST CANCER: ICD-10-CM

## 2024-04-25 DIAGNOSIS — Z98.890 S/P BREAST RECONSTRUCTION, LEFT: ICD-10-CM

## 2024-04-25 DIAGNOSIS — N64.89 BREAST ASYMMETRY IN FEMALE: Primary | ICD-10-CM

## 2024-04-25 PROCEDURE — 99024 POSTOP FOLLOW-UP VISIT: CPT

## 2024-04-25 NOTE — PROGRESS NOTES
[facility]  [address], [address], [place] [address]  [phone]    Patient Identification: Emily Hernandez is a 64 y.o. female     History of Present Illness: The patient is a 64 y.o.  year-old female  who presents to the office for post-op visit. Patient is 43 days s/p Fat Grafting To Right Breast. - Right and Left Mastopexy Breast For Symmetry - Left  on 3/13/2024 by Dr. Diamond.  Patient is accompanied by  at today's visit. She is wearing bra at all times and following restrictions.  Patient denies fevers, chills, signs of infection or significant pain. Applying scar cream daily.  Patient has no complaints at this time.    Past Medical History:   Diagnosis Date    Abnormal Pap smear of cervix 2010    Followed up following year with normal pap smear    Anemia March 28,2022    Due to infusion therapy?    Asthma 2018 2018 to Jan 2021, i was diagnosed with asthma, jan 2021, i did nit have asthma according to the breathing test the asthma doctor performed, i get allergiy shot every 2 weeks for 2 different types of molds    Cancer (HCC)     breast    Diarrhea     occasional    Port-A-Cath in place     R side chest    Varicella 1971    I have not had it since then.          Review of Systems  Constitutional: Denies fevers, chills or pain.  Skin: Denies any warmth, erythema, edema or mucopurulent drainage. Bruising    Physical Exam    Breast: Surgical incisions are healing well. No hypertrophy or hyperpigmentation. Nipple perfusion is intact. There are no signs of infection, obvious hematoma, seroma or wound dehiscence. Breasts symmetric in shape and size.      Assessment and Plan:  The patient is an 64 y.o.  year-old female who presents to the office for post-op edema. Patient is 43 days s/p Fat Grafting To Right Breast. - Right and Left Mastopexy Breast For Symmetry - Left  on 3/13/2024 by Dr. Diamond    -Breast incision sites healing well. Breasts symmetrical in  size and shape  -Apply Aquaphor to incision sites daily. Discussed daily scar massage and use of silicone scar gel/tape to promote scar softening and flattening. The patient was educated on scar care and that it can take up to 1 year for full scar maturation.  -The patient is to return in 6 weeks for a 3 month post-op with Dr. Diamond.   -The patient is to call the office with any questions or concerns. All of the patient's questions were answered at this time and they agree with the plan of care.      Joan Velazco PA-C  [facility]

## 2024-04-26 ENCOUNTER — OFFICE VISIT (OUTPATIENT)
Dept: OBGYN CLINIC | Facility: CLINIC | Age: 64
End: 2024-04-26
Payer: COMMERCIAL

## 2024-04-26 VITALS
DIASTOLIC BLOOD PRESSURE: 70 MMHG | BODY MASS INDEX: 20.99 KG/M2 | WEIGHT: 126 LBS | HEIGHT: 65 IN | SYSTOLIC BLOOD PRESSURE: 116 MMHG

## 2024-04-26 DIAGNOSIS — M65.311 TRIGGER THUMB OF RIGHT HAND: Primary | ICD-10-CM

## 2024-04-26 DIAGNOSIS — G56.01 CARPAL TUNNEL SYNDROME ON RIGHT: ICD-10-CM

## 2024-04-26 PROCEDURE — 20550 NJX 1 TENDON SHEATH/LIGAMENT: CPT | Performed by: PHYSICIAN ASSISTANT

## 2024-04-26 PROCEDURE — 99203 OFFICE O/P NEW LOW 30 MIN: CPT | Performed by: PHYSICIAN ASSISTANT

## 2024-04-26 RX ORDER — LIDOCAINE HYDROCHLORIDE 10 MG/ML
1 INJECTION, SOLUTION INFILTRATION; PERINEURAL
Status: COMPLETED | OUTPATIENT
Start: 2024-04-26 | End: 2024-04-26

## 2024-04-26 RX ORDER — BETAMETHASONE SODIUM PHOSPHATE AND BETAMETHASONE ACETATE 3; 3 MG/ML; MG/ML
6 INJECTION, SUSPENSION INTRA-ARTICULAR; INTRALESIONAL; INTRAMUSCULAR; SOFT TISSUE
Status: COMPLETED | OUTPATIENT
Start: 2024-04-26 | End: 2024-04-26

## 2024-04-26 RX ADMIN — LIDOCAINE HYDROCHLORIDE 1 ML: 10 INJECTION, SOLUTION INFILTRATION; PERINEURAL at 11:00

## 2024-04-26 RX ADMIN — BETAMETHASONE SODIUM PHOSPHATE AND BETAMETHASONE ACETATE 6 MG: 3; 3 INJECTION, SUSPENSION INTRA-ARTICULAR; INTRALESIONAL; INTRAMUSCULAR; SOFT TISSUE at 11:00

## 2024-04-26 NOTE — PROGRESS NOTES
"Patient Name:  Emily Hernandez  MRN:  [id number]    Assessment & Plan     Right trigger thumb, right carpal tunnel syndrome.  Patient was offered and accepted a right thumb A1 pulley corticosteroid injection today.  Patient also exhibits signs and symptoms consistent with carpal tunnel syndrome.  Universal wrist brace provided today to be worn at night.  Offered referral to Occupational Therapy.  Patient declined.  Ultrasound of the right wrist ordered to evaluate the median nerve within the carpal tunnel.  Follow-up after ultrasound with Dr. Lee.  Patient's spouse has seen Dr. Lee in the past.    Chief Complaint     Right thumb pain    History of the Present Illness     Emily Hernandez is a 64 y.o. right-hand-dominant female who reports to the office today for evaluation of her right thumb.  She notes an onset of pain approximately 3 months ago.  She denies any injury or trauma at that time.  She notes pain at the volar base of the thumb worse with direct pressure.  She also notes clicking popping and triggering of the thumb.  She denies any locking.  She currently takes nothing for pain.  Pain is worse with grasping objects.      She also notes an onset of numbness and tingling in the hand at that time as well.  She states the numbness and tingling occurs at night and causes her to wake up.  She also notes numbness and tingling in the morning.  She states she does drop objects on occasion as well.  No fevers or chills.    Physical Exam     /70   Ht 5' 5\" (1.651 m)   Wt 57.2 kg (126 lb)   BMI 20.97 kg/m²     Right hand: No gross deformity.  Skin intact.  No erythema ecchymosis or swelling.  No thenar atrophy.  There is a palpable nodule about the A1 pulley of the thumb which is severely tender to palpation.  Thumb range of motion is intact and limited with clicking popping and triggering.  No locking.  Full wrist flexion extension without pain.  Full composite fist formation.  Negative Tinel's about " the carpal tunnel.  Positive Durkan's compression test.  Sensation intact median ulnar and radial nerves.  5 out of 5 APB strength.  2+ radial pulse.    Eyes: Anicteric sclerae.  ENT: Trachea midline.  Lungs: Normal respiratory effort.  CV: Capillary refill is less than 2 seconds.  Skin: Intact without erythema.  Lymph: No palpable lymphadenopathy.  Neuro: Sensation is grossly intact to light touch.  Psych: Mood and affect are appropriate.    Past Medical History:   Diagnosis Date    Abnormal Pap smear of cervix 2010    Followed up following year with normal pap smear    Anemia March 28,2022    Due to infusion therapy?    Asthma 2018 2018 to Jan 2021, i was diagnosed with asthma, jan 2021, i did nit have asthma according to the breathing test the asthma doctor performed, i get allergiy shot every 2 weeks for 2 different types of molds    Cancer (HCC)     breast    Diarrhea     occasional    Port-A-Cath in place     R side chest    Varicella 1971    I have not had it since then.       Past Surgical History:   Procedure Laterality Date    BREAST BIOPSY Right 03/29/2022    [facility]    BREAST LUMPECTOMY Right 09/30/2022    Procedure: ML BREAST;  Surgeon: Azalea Motta MD;  Location: [facility];  Service: Surgical Oncology    CATARACT EXTRACTION Left 2019    with lens implant    IR PORT PLACEMENT  04/20/2022    LYMPH NODE BIOPSY Right 09/30/2022    Procedure: SLN BX;  Surgeon: Azalea Motta MD;  Location: [facility];  Service: Surgical Oncology    MANDIBLE SURGERY      jaw surgery for crooked jaw 1993, 1998    IA GRAFTING OF AUTOLOGOUS FAT BY LIPO 50 CC OR LESS Right 3/13/2024    Procedure: FAT GRAFTING TO RIGHT BREAST.;  Surgeon: Zandra Diamond MD;  [facility];  Service: Plastics    IA MASTOPEXY Left 3/13/2024    Procedure: LEFT MASTOPEXY BREAST FOR SYMMETRY;  Surgeon: Zandra Diamond MD;  [facility];  Service: Plastics    ROTATOR CUFF REPAIR Right 2020    US BREAST CLIP NEEDLE LOC RIGHT  Right 03/29/2022    US GUIDED BREAST BIOPSY RIGHT COMPLETE Right 03/29/2022       Allergies   Allergen Reactions    Molds & Smuts Hives and Anaphylaxis     Sore throat  Sore throat      Other Other (See Comments), Anaphylaxis and Hives     Mold/gets sore throat    Nuts - Food Allergy Hives     BRAZILIAN NUTS           Current Outpatient Medications on File Prior to Visit   Medication Sig Dispense Refill    albuterol (2.5 mg/3 mL) 0.083 % nebulizer solution USE 1 VIAL IN NEBULIZER EVERY 4 HOURS AS NEEDED FOR WHEEZING      albuterol (PROVENTIL HFA,VENTOLIN HFA) 90 mcg/act inhaler       cetirizine (ZyrTEC) 10 mg tablet Take 10 mg by mouth if needed      denosumab (Prolia) 60 mg/mL as directed Subcutaneous      EPINEPHrine (EPIPEN) 0.3 mg/0.3 mL SOAJ       letrozole (FEMARA) 2.5 mg tablet Take 1 tablet by mouth once daily 30 tablet 5    lidocaine-prilocaine (EMLA) cream Apply topically on the skin over the Port-A-Cath as advised 30 g 1    loperamide (IMODIUM) 2 mg capsule Take 2 mg by mouth 4 (four) times a day as needed for diarrhea      ondansetron (ZOFRAN-ODT) 4 mg disintegrating tablet Take 1 tablet (4 mg total) by mouth every 6 (six) hours as needed for nausea or vomiting 20 tablet 0    senna (SENOKOT) 8.6 mg Take 2 tablets (17.2 mg total) by mouth daily at bedtime 30 tablet 1    sodium chloride (OCEAN) 0.65 % nasal spray Saline Nasal Mist      docusate sodium (COLACE) 100 mg capsule Take 1 capsule (100 mg total) by mouth 3 (three) times a day as needed for constipation (Patient not taking: Reported on 4/10/2024) 40 capsule 1    enoxaparin (Lovenox) 40 mg/0.4 mL Inject 0.4 mL (40 mg total) under the skin in the morning for 7 days Use right or left thigh. (Patient not taking: Reported on 4/10/2024) 2.8 mL 0    oxyCODONE (Roxicodone) 5 immediate release tablet Take 1 tablet (5 mg total) by mouth every 4 (four) hours as needed for moderate pain for up to 12 doses Max Daily Amount: 30 mg (Patient not taking: Reported  on 4/10/2024) 12 tablet 0     No current facility-administered medications on file prior to visit.       Social History     Tobacco Use    Smoking status: Never     Passive exposure: Never    Smokeless tobacco: Never   Vaping Use    Vaping status: Never Used   Substance Use Topics    Alcohol use: Not Currently    Drug use: Never       Family History   Problem Relation Age of Onset    Prostate cancer Father     Asthma Father         He has had asthma previously    No Known Problems Sister     Asthma Brother         Has had asthma previously    No Known Problems Daughter     No Known Problems Daughter     Lung cancer Maternal Aunt     No Known Problems Paternal Aunt     No Known Problems Paternal Aunt     No Known Problems Maternal Grandmother     No Known Problems Maternal Grandfather     Breast cancer Paternal Grandmother 80        I was told she had breast cancer in her 80’s.    No Known Problems Paternal Grandfather     Stomach cancer Other        Review of Systems     As stated in the HPI. All other systems reviewed and are negative.      Hand/upper extremity injection: R thumb A1  Procedure Details  Condition:trigger finger Location: thumb - R thumb A1   Needle size: 25 G  Ultrasound guidance: no  Approach: volar  Medications administered: 1 mL lidocaine 1 %; 6 mg betamethasone acetate-betamethasone sodium phosphate 6 (3-3) mg/mL  Patient tolerance: patient tolerated the procedure well with no immediate complications  Dressing:  Sterile dressing applied

## 2024-04-30 ENCOUNTER — EVALUATION (OUTPATIENT)
Dept: PHYSICAL THERAPY | Facility: REHABILITATION | Age: 64
End: 2024-04-30
Payer: COMMERCIAL

## 2024-04-30 DIAGNOSIS — N81.11 MIDLINE CYSTOCELE: ICD-10-CM

## 2024-04-30 DIAGNOSIS — M62.89 PELVIC FLOOR DYSFUNCTION: Primary | ICD-10-CM

## 2024-04-30 PROCEDURE — 97161 PT EVAL LOW COMPLEX 20 MIN: CPT | Performed by: PHYSICAL THERAPIST

## 2024-04-30 PROCEDURE — 97530 THERAPEUTIC ACTIVITIES: CPT | Performed by: PHYSICAL THERAPIST

## 2024-04-30 NOTE — PROGRESS NOTES
PT Evaluation     Today's date: 2024  Patient name: Emily Hernandez  : 1960  MRN: [id number]  Referring provider: Mattie Ferrer MD  Dx:   Encounter Diagnosis     ICD-10-CM    1. Pelvic floor dysfunction  M62.89       2. Midline cystocele  N81.11 Ambulatory Referral to Physical Therapy          Assessment  Assessment details: Emily Hernandez is a 64 y.o.  postmenopausal female with complaints of symptoms of pelvic organ prolapse.  Patient's presentation is consistent with mild pelvic floor muscle weakness contributing to symptoms of POP with grade 2 cystocele with the following impairments found on evaluation: PFM endurance and power, functional PFM coordination. She has few risk factors for POP except multiparity with vacuum assisted delivery, and postmenopausal state. These impairments contribute to the following functional limitations: decreased tolerance to standing and physical activities; and the following disability: progression toward surgical needs. She may benefit from pessary use if symptoms of POP do not improve in 4-6 weeks, or as a prevention of progression.  Emily Hernandez is a good candidate for physical therapy and would benefit from skilled physical therapy (specifically, PFM strenthening, IAP management, functional PFM strengthening) to address the above impairments in order to allow the patient to achieve the goals listed and return to prior level of function.      During initial evaluation, education was provided on anatomy and function of the pelvic floor muscles, viscerosomatic convergence and regional interdependence of the lumbo-pelvic-hip complex, and POP risk factors.  Patient also educated on diagnosis, plan of care and prognosis. Patient provided written and verbal consent for pelvic floor muscle exam. She is in agreement with recommended plan of care and goals for therapy, and demonstrates motivation for active participation in proposed plan of care.    Symptom  irritability: lowUnderstanding of Dx/Px/POC: good   Prognosis: good    Goals  Patient will demonstrate improved PFM strength to at least 4/5 in 8 weeks.  Patient will report improvement in symptoms of vaginal bulge by 50% in 8 weeks.  Patient will demonstrate independent and consistent pelvic brace with lifting > 15lb objects in 4 weeks.    Plan  Frequency: 1x week  Duration in weeks: 8  Plan of Care beginning date: 2024  Plan of Care expiration date: 2024  Treatment plan discussed with: referring physician and patient      PT Pelvic Floor Subjective:   History of Present Illness:   Symptoms of Pelvic organ prolapse: vaginal bulge that she can see and feel it- some days are worse than others- the days when she is more active/busy with iADLs. First diagnosed with POP in  and feels that it is worsening in the last 2 years. Some pelvic pain with gas/flatus. Has been kegeling intermittently for the last few weeks.  Reports symptoms of a yeast infection after most recent GYN appt- self dx and using Miconazole 7 x3 days.    URO: urinary frequency (every 2 hours), nocturia 2-3 times; + sensation of incomplete voiding; drinks 40-60 oz total fluids: water and cranberry juice combined, 1-2 cups coffee, 1-2 cups herbal tea. Denies dysuria, urinary incontinence, hesitancy.  BOWEL: WNL with BM 2-3 times per day with occasional diarrhea with FI.   vaginal delivery- first was vacuum assisted. Not currently sexually active; no h/o sexual dysfunction. Menopause at age 57.  ORTHO: denies back injuries. Occasional R sciatica  Exercise: elliptical    HPI: R breast cancer ([facility], [place] 70%, HER2 positive) - dx in 2022.  Negative genetic testing. Treatment: neoadjuvant chemotherapy with TCHP and targeted therapy; right lumpectomy and sentinel node biopsy (0/8 positive) on 22 with Dr. Motta; adjuvant RT 2022 - 2022.  Currently on Herceptin and Perjecta therapy; letrozole.  Pain:     Current pain  ratin    At best pain ratin    At worst pain ratin  Patient Goals:     Patient goals for therapy:  Improved bladder or bowel function and improved quality of life      Objective       Abdominal Assessment:          General Perineum Exam:   perineum intact.   Positive for gaping introitus, no pelvic organ prolapse at rest and perianal erythema.   Negative for swelling and hemorrhoids    Visual Inspection of Perineum:   Excursion of perineal body in cephalad direction with contraction of pelvic floor muscles (PFM): fair   Excursion of perineal body in caudal direction with relaxation of pelvic floor muscles (PFM): good   Involuntary contraction with coughing: yes  Involuntary relaxation with bearing down: yes  Cotton swab test: non-tender  Sensation: intact    Pelvic Organ Prolapse   Position: hook-lying  At rest: none  With bearing down: moderate (to the level of hymenal remnants)  Location: anterior  Perineal body inspection: within normal limits        Pelvic Floor Muscle Exam:     Muscle Contraction: well isolated   Breathing pattern with contraction: within normal limits   Pelvic floor muscle relaxation is complete.         PERFECT Score   Power right: 3/5   Power left: 3/5   Endurance (seconds to max): 3   Repetitions (before fatigue): 6       Rectal Pelvic Floor Muscle Exam  no hemorrhoids    pelvic floor exam consent given by patient    Pelvic exam completed: vaginally            Precautions: h/o breast CA    POC expires  Auth Status? (BOMN, approved, pending) Unit limit (Daily) Auth Start date Expiration date PT/OT + Visit Limit?   24          Date of Service         Visits Used 1        Visits Remaining 99        Patient Education         [employer] acct created?         PFM anatomy and function         Bladder healthy habits         Voiding diaries         Fluid intake                           Neuro Re-Ed         Urge deferral         Defecation mechanics         Pelvic drop          PF elevator         DB                  Ther Ex         Self-manuals         Dilator training         PFM strengthening         Hip strengthening         PFM stretches                  Ther Activity                                    Manual Ther         PFM exam         Ortho exam         PFM STM/MFR         Abdominal MFR                           Modalities

## 2024-05-08 ENCOUNTER — OFFICE VISIT (OUTPATIENT)
Dept: PHYSICAL THERAPY | Facility: REHABILITATION | Age: 64
End: 2024-05-08
Payer: COMMERCIAL

## 2024-05-08 DIAGNOSIS — M62.89 PELVIC FLOOR DYSFUNCTION: ICD-10-CM

## 2024-05-08 DIAGNOSIS — N81.11 MIDLINE CYSTOCELE: Primary | ICD-10-CM

## 2024-05-08 PROCEDURE — 97110 THERAPEUTIC EXERCISES: CPT | Performed by: PHYSICAL THERAPIST

## 2024-05-08 NOTE — PROGRESS NOTES
Daily Note     Today's date: 2024  Patient name: Emily Hernandez  : 1960  MRN: [id number]  Referring provider: Mattie Ferrer MD  Dx:   Encounter Diagnosis     ICD-10-CM    1. Midline cystocele  N81.11       2. Pelvic floor dysfunction  M62.89           Subjective: Would like to have the name of a urogynecologist just in case.    Objective: See treatment diary below.    Assessment: Tolerated treatment well. Trained patient in coordination of PFM contraction with exhale, and incorporated into hip strengthening and function PF movements. HEP established    Plan: Continue per plan of care.      Access Code: [email or web address]  URL: [email or web address]  Date: 2024  Prepared by: Rekha Steimling    Exercises  - Supine Bridge  - 1 x daily - 30 reps  - Seated Adductor Squeeze with Small Ball  - 1 x daily - 30 reps  - Sit to Stand with Pelvic Floor Contraction  - 1 x daily - 30 reps  - Split Stance Chest Press with Resistance  - 1 x daily - 30 reps  - Standing Row with Anchored Resistance  - 1 x daily - 30 reps     Precautions: h/o breast CA    POC expires  Auth Status? (BOMN, approved, pending) Unit limit (Daily) Auth Start date Expiration date PT/OT + Visit Limit?   24          Date of Service        Visits Used 1 2       Visits Remaining 99 99       Patient Education         [facility] acct created?         PFM anatomy and function 5'        POP 10'                 Neuro Re-Ed         Urge deferral         Defecation mechanics         Pelvic drop         PF elevator         DB                  Ther Ex         PFM strengthening- lower quarter  Supine bridge x30  S/l clam x30  Seated adductor squeeze x30  Repeated STS x30       PFM strengthening- abdominal wall         PFM strengthening- upper quarter  Chest press 10# YCO x20  Row 10# YCO x20                                  Ther Activity                                    Manual Ther         PFM exam completed        Ortho exam

## 2024-05-15 ENCOUNTER — OFFICE VISIT (OUTPATIENT)
Dept: PHYSICAL THERAPY | Facility: REHABILITATION | Age: 64
End: 2024-05-15
Payer: COMMERCIAL

## 2024-05-15 DIAGNOSIS — N81.11 MIDLINE CYSTOCELE: Primary | ICD-10-CM

## 2024-05-15 DIAGNOSIS — M62.89 PELVIC FLOOR DYSFUNCTION: ICD-10-CM

## 2024-05-15 PROCEDURE — 97110 THERAPEUTIC EXERCISES: CPT

## 2024-05-15 PROCEDURE — 97112 NEUROMUSCULAR REEDUCATION: CPT

## 2024-05-15 NOTE — PROGRESS NOTES
Daily Note     Today's date: 5/15/2024  Patient name: Emily Hernandez  : 1960  MRN: [id number]  Referring provider: Mattie Ferrer MD  Dx:   Encounter Diagnosis     ICD-10-CM    1. Midline cystocele  N81.11       2. Pelvic floor dysfunction  M62.89             Subjective: Pt did not reach out to the urogynecologist yet, she would prefer not to travel too far and also would like a woman.    Objective: See treatment diary below.    Assessment: Tolerated treatment well. Spent today's session reviewing pressure management and introduced more exercises both hooklying and standing. Pt challenged to coordinate but with cueing and good pacing able to perform well.  Gave information on a woman provider.      Plan: Continue per plan of care.      Access Code: [email or web address]  URL: [email or web address]  Date: 2024  Prepared by: Rekha Steimling    Exercises  - Supine Bridge  - 1 x daily - 30 reps  - Seated Adductor Squeeze with Small Ball  - 1 x daily - 30 reps  - Sit to Stand with Pelvic Floor Contraction  - 1 x daily - 30 reps  - Split Stance Chest Press with Resistance  - 1 x daily - 30 reps  - Standing Row with Anchored Resistance  - 1 x daily - 30 reps     Precautions: h/o breast CA    POC expires  Auth Status? (BOMN, approved, pending) Unit limit (Daily) Auth Start date Expiration date PT/OT + Visit Limit?   24          Date of Service 4/30 5/8 5/15      Visits Used 1 2 3      Visits Remaining 99 99 99      Patient Education         [facility] acct created?         PFM anatomy and function 5'        POP 10'                 Neuro Re-Ed         Urge deferral   Reviewed & practiced      Defecation mechanics   Loose bowels / no constipation      Pelvic drop         PF elevator         DB   10'               Ther Ex         PFM strengthening- lower quarter  Supine bridge x30  S/l clam x30  Seated adductor squeeze x30  Repeated STS x30 Bridge against wall 20x, single leg bridge 20  PFM contraction 20x       PFM strengthening- abdominal wall         PFM strengthening- upper quarter  Chest press 10# YCO x20  Row 10# YCO x20 Bicep curls 5# 2x10      Squats against wall   20x                         Ther Activity                                    Manual Ther         PFM exam completed        Ortho exam

## 2024-05-17 ENCOUNTER — HOSPITAL ENCOUNTER (OUTPATIENT)
Dept: ULTRASOUND IMAGING | Facility: HOSPITAL | Age: 64
End: 2024-05-17
Payer: COMMERCIAL

## 2024-05-17 DIAGNOSIS — G56.01 CARPAL TUNNEL SYNDROME ON RIGHT: ICD-10-CM

## 2024-05-17 PROCEDURE — 76882 US LMTD JT/FCL EVL NVASC XTR: CPT

## 2024-05-21 ENCOUNTER — OFFICE VISIT (OUTPATIENT)
Dept: PHYSICAL THERAPY | Facility: REHABILITATION | Age: 64
End: 2024-05-21
Payer: COMMERCIAL

## 2024-05-21 DIAGNOSIS — M62.89 PELVIC FLOOR DYSFUNCTION: ICD-10-CM

## 2024-05-21 DIAGNOSIS — N81.11 MIDLINE CYSTOCELE: Primary | ICD-10-CM

## 2024-05-21 PROCEDURE — 97110 THERAPEUTIC EXERCISES: CPT

## 2024-05-21 NOTE — PROGRESS NOTES
Daily Note     Today's date: 2024  Patient name: Emily Hernandez  : 1960  MRN: [id number]  Referring provider: Mattie Ferrer MD  Dx:   Encounter Diagnosis     ICD-10-CM    1. Midline cystocele  N81.11       2. Pelvic floor dysfunction  M62.89               Subjective: Pt will reach out to the urogynecologist when she is ready.  Is compliant with her exercises, can appreciate when they are fatiguing. Is now using the Casero josé.    Objective: See treatment diary below.    Assessment: Tolerated treatment well. Appears more confident with her exercises, did add the quadriped series.  May benefit from the tactile cueing of the wall initially to avoid compensatory strategies.     Plan: Continue per plan of care.    Access Code: [email or web address]  URL: [email or web address]  Date: 2024  Prepared by: Bruna Salazar Notes  Exhale and engage your pelvic floor muscles as you go against gravity.    Exercises  - Wall Quarter Squat  - 1 x daily - 7 x weekly - 2 sets - 10 reps - 5 hold  - bridge against the wall  - 1 x daily - 7 x weekly - 2 sets - 10 reps - 5 hold  - Standing Bicep Curl with Pelvic Floor Contraction  - 1 x daily - 7 x weekly - 2 sets - 10 reps - 5 hold  - Quadruped Exhale with Pelvic Floor Contraction  - 1 x daily - 7 x weekly - 2 sets - 10 reps - 5 hold  - Quadruped Exhale with Pelvic Floor Contraction and Leg Extension  - 1 x daily - 7 x weekly - 2 sets - 10 reps - 5 hold  - Quadruped Exhale with Pelvic Floor Contraction and Arm Raise  - 1 x daily - 7 x weekly - 2 sets - 10 reps - 5 hold  - Quadruped Pelvic Floor Contraction with Opposite Arm and Leg Lift  - 1 x daily - 7 x weekly - 2 sets - 10 reps - 5 hold  - Sit to Stand with Pelvic Floor Contraction  - 1 x daily - 7 x weekly - 2 sets - 10 reps - 5 hold  Precautions: h/o breast CA    POC expires  Auth Status? (BOMN, approved, pending) Unit limit (Daily) Auth Start date Expiration date PT/OT + Visit Limit?   24           Date of Service 4/30 5/8 5/15 5/21     Visits Used 1 2 3 4     Visits Remaining 99 [phone] 99     Patient Education         Medbridge acct created?         PFM anatomy and function 5'        POP 10'                 Neuro Re-Ed         Urge deferral   Reviewed & practiced      Defecation mechanics   Loose bowels / no constipation      Pelvic drop         PF elevator         DB   10'               Ther Ex         PFM strengthening- lower quarter  Supine bridge x30  S/l clam x30  Seated adductor squeeze x30  Repeated STS x30 Bridge against wall 20x, single leg bridge 20  PFM contraction 20x Bridge w/ add 2x10  Q-ped 2x10  Q-ped hip hinge 10x  Q-ped arm lift 10x  Q-ped leg lift 10x  Q-ped bird dog 10x     PFM strengthening- abdominal wall         PFM strengthening- upper quarter  Chest press 10# YCO x20  Row 10# YCO x20 Bicep curls 5# 2x10 Bicep curls 5# 2x10  Chest press supine 3# 2x10     Squats against wall   20x  10x                       Ther Activity                                    Manual Ther         PFM exam completed        Ortho exam

## 2024-05-22 ENCOUNTER — HOSPITAL ENCOUNTER (OUTPATIENT)
Dept: INFUSION CENTER | Facility: CLINIC | Age: 64
Discharge: HOME | End: 2024-05-22
Payer: COMMERCIAL

## 2024-05-22 DIAGNOSIS — Z95.828 PORT-A-CATH IN PLACE: Primary | ICD-10-CM

## 2024-05-22 PROCEDURE — 96523 IRRIG DRUG DELIVERY DEVICE: CPT

## 2024-05-22 NOTE — PROGRESS NOTES
Pt here for port flush only.  POrt flushed per protocol.  Pt declined AVS but is aware of next appt on 7/3/24 at 12:00.

## 2024-05-29 ENCOUNTER — OFFICE VISIT (OUTPATIENT)
Dept: PHYSICAL THERAPY | Facility: REHABILITATION | Age: 64
End: 2024-05-29
Payer: COMMERCIAL

## 2024-05-29 DIAGNOSIS — M62.89 PELVIC FLOOR DYSFUNCTION: ICD-10-CM

## 2024-05-29 DIAGNOSIS — N81.11 MIDLINE CYSTOCELE: Primary | ICD-10-CM

## 2024-05-29 PROCEDURE — 97530 THERAPEUTIC ACTIVITIES: CPT | Performed by: PHYSICAL THERAPIST

## 2024-05-29 PROCEDURE — 97140 MANUAL THERAPY 1/> REGIONS: CPT | Performed by: PHYSICAL THERAPIST

## 2024-05-29 PROCEDURE — 97110 THERAPEUTIC EXERCISES: CPT | Performed by: PHYSICAL THERAPIST

## 2024-05-29 NOTE — PROGRESS NOTES
"Daily Note     Today's date: 2024  Patient name: Emily Hernandez  : 1960  MRN: [id number]  Referring provider: Mattie Ferrer MD  Dx:   Encounter Diagnosis     ICD-10-CM    1. Midline cystocele  N81.11       2. Pelvic floor dysfunction  M62.89         Subjective: Has not had any symptoms of POP since we started therapy, but is concerned that she will feel something as she knows the prolapse is there.    Objective: See treatment diary below.  PFM exam: 3+/5 strength, 6\" x 10 reps    Assessment: Tolerated treatment well. PFM exam yields improved endurance and mildly improved PFM strength. Reviewed breath mechanics with lifting and functional carrying activities, with cueing to avoid/correct outward bracing technique. Reviewed and progressed HEP with good tolerance. Anticipate progression of strengthening and d/c in 4 weeks is still asymptomatic.    Plan: Continue per plan of care.      Access Code: [email or web address]  URL: [email or web address]  Date: 2024  Prepared by: Rekha Steimling    Program Notes  Exhale and engage your pelvic floor muscles as you go against gravity.    Exercises  - Wall Quarter Squat  - 4 x weekly - 20 reps  - bridge against the wall  - 4 x weekly - 20 reps  - Chest Press with Resistance  - 4 x weekly - 20 reps  - Shoulder Extension with Resistance  - 4 x weekly - 20 reps  - Standing Bicep Curl with Pelvic Floor Contraction  - 4 x weekly - 20 reps  - Quadruped Exhale with Pelvic Floor Contraction  - 4 x weekly - 20 reps  - Quadruped Pelvic Floor Contraction with Opposite Arm and Leg Lift  - 4 x weekly - 10 reps - 3 hold  - Sit to Stand with Pelvic Floor Contraction  - 1 x daily - 10 reps    Precautions: h/o breast CA    POC expires  Auth Status? (BOMN, approved, pending) Unit limit (Daily) Auth Start date Expiration date PT/OT + Visit Limit?   24          Date of Service 4/30 5/8 5/15 5/21 5/29    Visits Used 1 2 3 4 5    Visits Remaining 99 99 99 [phone] 99    Patient " "Education         Medbridge acct created?         PFM anatomy and function 5'        POP 10'                 Neuro Re-Ed         Urge deferral   Reviewed & practiced      Defecation mechanics   Loose bowels / no constipation      Pelvic drop         PF elevator         DB   10'               Ther Ex         PFM strengthening- lower quarter  Supine bridge x30  S/l clam x30  Seated adductor squeeze x30  Repeated STS x30 Bridge against wall 20x, single leg bridge 20  PFM contraction 20x Bridge w/ add 2x10  Q-ped 2x10  Q-ped hip hinge 10x  Q-ped arm lift 10x  Q-ped leg lift 10x  Q-ped bird dog 10x Bridge w/ add 2x10  Q-ped 2x10  Q-ped hip hinge 10x  Q-ped bird dog 10x    PFM strengthening- abdominal wall         PFM strengthening- upper quarter  Chest press 10# YCO x20  Row 10# YCO x20 Bicep curls 5# 2x10 Bicep curls 5# 2x10  Chest press supine 3# 2x10 Chest press 15# band x20  Row 15# band x20  LPD 15# band x20    Squats against wall   20x  10x 5\" x10 w exhale    Not on wall x10                      Ther Activity         Lifting mechanics     12.5#, 22.5#, 32.5# from floor to table  30# KB lift and twist, 30# carry around gym 20'                      Manual Ther         PFM exam completed    completed    Ortho exam                                                     "

## 2024-06-04 ENCOUNTER — OFFICE VISIT (OUTPATIENT)
Dept: PHYSICAL THERAPY | Facility: REHABILITATION | Age: 64
End: 2024-06-04
Payer: COMMERCIAL

## 2024-06-04 DIAGNOSIS — N81.11 MIDLINE CYSTOCELE: Primary | ICD-10-CM

## 2024-06-04 DIAGNOSIS — M62.89 PELVIC FLOOR DYSFUNCTION: ICD-10-CM

## 2024-06-04 PROCEDURE — 97110 THERAPEUTIC EXERCISES: CPT | Performed by: PHYSICAL THERAPIST

## 2024-06-04 NOTE — PROGRESS NOTES
Daily Note     Today's date: 2024  Patient name: Emily Hernandez  : 1960  MRN: [id number]  Referring provider: Mattie Ferrer MD  Dx:   Encounter Diagnosis     ICD-10-CM    1. Midline cystocele  N81.11       2. Pelvic floor dysfunction  M62.89           Subjective: Feels good after doing PT HEP, which she is doing daily.    Objective: See treatment diary below.    Assessment: Tolerated treatment well. Continued to progress strengthening exercises today with good tolerance.    Plan: Continue per plan of care.      Access Code: [email or web address]  URL: [email or web address]  Date: 2024  Prepared by: Rekha Gentile    Program Notes  Exhale and engage your pelvic floor muscles as you go against gravity.    Exercises  - Goblet Squat with Kettlebell  - 1 x daily - 30 reps  - Upright Side Lunge  - 1 x daily - 30 reps  - Standing Heel Raise  - 1 x daily - 30 reps  - Chest Press with Resistance  - 4 x weekly - 20 reps  - Shoulder Extension with Resistance  - 4 x weekly - 20 reps  - Standing Bicep Curl with Pelvic Floor Contraction  - 4 x weekly - 20 reps  - Quadruped Exhale with Pelvic Floor Contraction  - 4 x weekly - 20 reps  - Quadruped Pelvic Floor Contraction with Opposite Arm and Leg Lift  - 4 x weekly - 10 reps - 3 hold  - Standing Anti-Rotation Press with Anchored Resistance  - 1 x daily - 30 reps  - Plank with Hip Extension  - 1 x daily - 30 reps  - Plank with Hip Abduction  - 1 x daily - 30 reps  - Full Plank with Shoulder Taps  - 1 x daily - 30 reps    Precautions: h/o breast CA    POC expires  Auth Status? (BOMN, approved, pending) Unit limit (Daily) Auth Start date Expiration date PT/OT + Visit Limit?   24          Date of Service 5/15 5/21 5/29 6/4    Visits Used 3 4 5 6    Visits Remaining 99 99 [phone] 99    Patient Education        [facility]        PFM anatomy and function        POP                Neuro Re-Ed        Urge deferral Reviewed & practiced       Defecation  "mechanics Loose bowels / no constipation       Pelvic drop        PF elevator        DB 10'               Ther Ex        PFM strengthening- lower quarter Bridge against wall 20x, single leg bridge 20  PFM contraction 20x Bridge w/ add 2x10  Q-ped 2x10  Q-ped hip hinge 10x  Q-ped arm lift 10x  Q-ped leg lift 10x  Q-ped bird dog 10x Bridge w/ add 2x10  Q-ped 2x10  Q-ped hip hinge 10x  Q-ped bird dog 10x Heel raise + PF x20  Lateral lunge 10# KB x30 each  Squat 15# x30 each  Lateral step up 8\" x30 alt    PFM strengthening- abdominal wall    Incline plank w variations 4x1 min  Standing palov press 10# x30 w march x30      PFM strengthening- upper quarter Bicep curls 5# 2x10 Bicep curls 5# 2x10  Chest press supine 3# 2x10 Chest press 15# band x20  Row 15# band x20  LPD 15# band x20 Chest press 15# band x20  Row 15# band x20  LPD 15# band x20    Squats against wall 20x  10x 5\" x10 w exhale    Not on wall x10     Upright bike    6' L 6    UBE    L1 6'    Ther Activity        Lifting mechanics   12.5#, 22.5#, 32.5# from floor to table  30# KB lift and twist, 30# carry around gym 20'                     Manual Ther        PFM exam   completed     Ortho exam                                                "

## 2024-06-05 ENCOUNTER — OFFICE VISIT (OUTPATIENT)
Dept: OBGYN CLINIC | Facility: HOSPITAL | Age: 64
End: 2024-06-05
Payer: COMMERCIAL

## 2024-06-05 ENCOUNTER — HOSPITAL ENCOUNTER (OUTPATIENT)
Dept: RADIOLOGY | Facility: HOSPITAL | Age: 64
Discharge: HOME | End: 2024-06-05
Attending: ORTHOPAEDIC SURGERY
Payer: COMMERCIAL

## 2024-06-05 VITALS
BODY MASS INDEX: 21.11 KG/M2 | SYSTOLIC BLOOD PRESSURE: 117 MMHG | HEIGHT: 65 IN | HEART RATE: 72 BPM | WEIGHT: 126.7 LBS | DIASTOLIC BLOOD PRESSURE: 70 MMHG

## 2024-06-05 DIAGNOSIS — R52 PAIN: ICD-10-CM

## 2024-06-05 DIAGNOSIS — M65.311 TRIGGER THUMB OF RIGHT HAND: Primary | ICD-10-CM

## 2024-06-05 PROCEDURE — 73140 X-RAY EXAM OF FINGER(S): CPT

## 2024-06-05 PROCEDURE — 20550 NJX 1 TENDON SHEATH/LIGAMENT: CPT | Performed by: ORTHOPAEDIC SURGERY

## 2024-06-05 PROCEDURE — 99214 OFFICE O/P EST MOD 30 MIN: CPT | Performed by: ORTHOPAEDIC SURGERY

## 2024-06-05 RX ADMIN — BETAMETHASONE SODIUM PHOSPHATE AND BETAMETHASONE ACETATE 6 MG: 3; 3 INJECTION, SUSPENSION INTRA-ARTICULAR; INTRALESIONAL; INTRAMUSCULAR; SOFT TISSUE at 11:30

## 2024-06-05 NOTE — PROGRESS NOTES
ASSESSMENT/PLAN:    Assessment:   Carpal Tunnel Syndrome  right  Right thumb trigger finger  Right thumb CMC arthritis    Plan:   Discussed treatment options including continued observation, activity modification, bracing, anti-inflammatories, hand therapy, cortisone injection, versus surgical intervention.  Patient elected for conservative treatment of right carpal tunnel syndrome to include use of cock up wrist brace. Patient elected for CS injection with Celestone and Ropivacaine at time of visit, patient tolerated well. This was patient's 2nd injection. She will follow-up in 6 weeks for re-check. She will continue with conservative treatment of right thumb CMC.    Follow Up:  6  week(s)    To Do Next Visit:  Re-evaluation right thumb CMC arthritis, right carpal tunnel, right thumb trigger finger    General Discussions:  CMC Arthritis: The anatomy and physiology of carpometacarpal joint arthritis was discussed with the patient today in the office.  Deterioration of the articular cartilage eventually leads to hypermobility at the thumb CMC joint, resulting in joint subluxation, osteophyte formation, cystic changes within the trapezium and base of the first metacarpal, as well as subchondral sclerosis.  Eventually, pain, limited mobility, and compensatory hyperextension at the metacarpophalangeal joint may develop.  While normal activity and usage of the thumb joint may provide a painful experience to the patient, this typically does not result in damage to the thumb or hand.  Treatment options include resting thumb spica splints to decreased joint edema, pain, and inflammation.  Therapy exercises to strengthen the thenar musculature may relieve pain, but do not alter the overall continued development of osteoarthritis.  Oral medications, topical medications, corticosteroid injections may decrease pain and increase overall function.  Eventually, approximately 5% of patients may require surgical intervention.      Carpal Tunnel Syndrome: The anatomy and physiology of carpal tunnel syndrome was discussed with the patient today.  Increase pressure localized under the transverse carpal ligament can cause pain, numbness, tingling, or dysesthesias within the median nerve distribution as well as feelings of fatigue, clumsiness, or awkwardness.  These symptoms typically occur at night and worse in the morning upon waking.  Eventually, untreated carpal tunnel syndrome can result in weakness and permanent loss of muscle within the thenar compartment of the hand.  Treatment options were discussed with the patient.  Conservative treatment includes nocturnal resting splints to keep the nerve in a neutral position, ergonomic changes within the work or home environment, activity modification, and tendon gliding exercises.  Steroid injections within the carpal canal can help a majority of patients, however this is often self-limited in a majority of patients.  Surgical intervention to divide the transverse carpal ligament typically results in a long-lasting relief of the patient's complaints, with the recurrence rate of less than 1%.   Trigger FInger: The anatomy and physiology of trigger finger was discussed with the patient today in the office.  Edema and increased contact pressure within the flexor tendons at the A1 pulley can cause pain, crepitation, and limitation of function.  Treatment options include resting MP blocking splints to decrease edema, oral anti-inflammatory medications, home or formal therapy exercises, up to 2 steroid injections within the tendon sheath, or surgical release.  While majority of patients do respond to conservative treatment, up to 20% may require surgical release.     Operative Discussions:      _____________________________________________________  CHIEF COMPLAINT:  Chief Complaint   Patient presents with    Right Wrist - Carpal Tunnel     US- 5/17/24    Right Thumb - Triggering     TF- 1st injection  4/26/24 by Roger          SUBJECTIVE:  Emily Hernandez is a 64 y.o. female who presents with numbness and tingling into the right hand that occurs especially at night and causes her to wake up throughout the night. She was seen by Lizandro Duran on 4/26/24 where she received a CS injection for a right trigger thumb. She states that the carpal tunnel syndrome and triggering have improved. She states that she occasionally gets pain into the right thumb CMC  Radiation: Yes to the  thumb  Previous Treatments: right thumb trigger finger injection by Lizandro Duran on 4/26/24, cock up wrist brace right wrist for sleeping both with improvement    Handedness: right  Work status: Unknown    PAST MEDICAL HISTORY:  Past Medical History:   Diagnosis Date    Abnormal Pap smear of cervix 2010    Followed up following year with normal pap smear    Anemia March 28,2022    Due to infusion therapy?    Asthma 2018 2018 to Jan 2021, i was diagnosed with asthma, jan 2021, i did nit have asthma according to the breathing test the asthma doctor performed, i get allergiy shot every 2 weeks for 2 different types of molds    Cancer (HCC)     breast    Diarrhea     occasional    Port-A-Cath in place     R side chest    Varicella 1971    I have not had it since then.       PAST SURGICAL HISTORY:  Past Surgical History:   Procedure Laterality Date    BREAST BIOPSY Right 03/29/2022    [facility]    BREAST LUMPECTOMY Right 09/30/2022    Procedure: ML BREAST;  Surgeon: Azalea Motta MD;  Location: [facility];  Service: Surgical Oncology    CATARACT EXTRACTION Left 2019    with lens implant    IR PORT PLACEMENT  04/20/2022    LYMPH NODE BIOPSY Right 09/30/2022    Procedure: SLN BX;  Surgeon: Azalea Motta MD;  Location: [facility];  Service: Surgical Oncology    MANDIBLE SURGERY      jaw surgery for crooked jaw 1993, 1998    KY GRAFTING OF AUTOLOGOUS FAT BY LIPO 50 CC OR LESS Right 3/13/2024    Procedure: FAT GRAFTING TO RIGHT BREAST.;  Surgeon: Zandra Johnson  MD Lobo;  [facility];  Service: Plastics    AK MASTOPEXY Left 3/13/2024    Procedure: LEFT MASTOPEXY BREAST FOR SYMMETRY;  Surgeon: Zandra Diamond MD;  [facility];  Service: Plastics    ROTATOR CUFF REPAIR Right 2020    US BREAST CLIP NEEDLE LOC RIGHT Right 03/29/2022    US GUIDED BREAST BIOPSY RIGHT COMPLETE Right 03/29/2022       FAMILY HISTORY:  Family History   Problem Relation Age of Onset    Prostate cancer Father     Asthma Father         He has had asthma previously    No Known Problems Sister     Asthma Brother         Has had asthma previously    No Known Problems Daughter     No Known Problems Daughter     Lung cancer Maternal Aunt     No Known Problems Paternal Aunt     No Known Problems Paternal Aunt     No Known Problems Maternal Grandmother     No Known Problems Maternal Grandfather     Breast cancer Paternal Grandmother 80        I was told she had breast cancer in her 80’s.    No Known Problems Paternal Grandfather     Stomach cancer Other        SOCIAL HISTORY:  Social History     Tobacco Use    Smoking status: Never     Passive exposure: Never    Smokeless tobacco: Never   Vaping Use    Vaping status: Never Used   Substance Use Topics    Alcohol use: Not Currently    Drug use: Never       MEDICATIONS:    Current Outpatient Medications:     albuterol (2.5 mg/3 mL) 0.083 % nebulizer solution, USE 1 VIAL IN NEBULIZER EVERY 4 HOURS AS NEEDED FOR WHEEZING, Disp: , Rfl:     albuterol (PROVENTIL HFA,VENTOLIN HFA) 90 mcg/act inhaler, , Disp: , Rfl:     cetirizine (ZyrTEC) 10 mg tablet, Take 10 mg by mouth if needed, Disp: , Rfl:     denosumab (Prolia) 60 mg/mL, as directed Subcutaneous, Disp: , Rfl:     docusate sodium (COLACE) 100 mg capsule, Take 1 capsule (100 mg total) by mouth 3 (three) times a day as needed for constipation (Patient not taking: Reported on 4/10/2024), Disp: 40 capsule, Rfl: 1    enoxaparin (Lovenox) 40 mg/0.4 mL, Inject 0.4 mL (40 mg total) under the  "skin in the morning for 7 days Use right or left thigh. (Patient not taking: Reported on 4/10/2024), Disp: 2.8 mL, Rfl: 0    EPINEPHrine (EPIPEN) 0.3 mg/0.3 mL SOAJ, , Disp: , Rfl:     letrozole (FEMARA) 2.5 mg tablet, Take 1 tablet by mouth once daily, Disp: 30 tablet, Rfl: 5    lidocaine-prilocaine (EMLA) cream, Apply topically on the skin over the Port-A-Cath as advised, Disp: 30 g, Rfl: 1    loperamide (IMODIUM) 2 mg capsule, Take 2 mg by mouth 4 (four) times a day as needed for diarrhea, Disp: , Rfl:     ondansetron (ZOFRAN-ODT) 4 mg disintegrating tablet, Take 1 tablet (4 mg total) by mouth every 6 (six) hours as needed for nausea or vomiting, Disp: 20 tablet, Rfl: 0    oxyCODONE (Roxicodone) 5 immediate release tablet, Take 1 tablet (5 mg total) by mouth every 4 (four) hours as needed for moderate pain for up to 12 doses Max Daily Amount: 30 mg (Patient not taking: Reported on 4/10/2024), Disp: 12 tablet, Rfl: 0    senna (SENOKOT) 8.6 mg, Take 2 tablets (17.2 mg total) by mouth daily at bedtime, Disp: 30 tablet, Rfl: 1    sodium chloride (OCEAN) 0.65 % nasal spray, Saline Nasal Mist, Disp: , Rfl:     ALLERGIES:  Allergies   Allergen Reactions    Molds & Smuts Hives and Anaphylaxis     Sore throat  Sore throat      Other Other (See Comments), Anaphylaxis and Hives     Mold/gets sore throat    Nuts - Food Allergy Hives     BRAZILIAN NUTS           REVIEW OF SYSTEMS:  Pertinent items are noted in HPI.  A comprehensive review of systems was negative.    LABS:  HgA1c: No results found for: \"HGBA1C\"  BMP:   Lab Results   Component Value Date    CALCIUM 9.4 03/06/2024    K 4.3 03/06/2024    CO2 28 03/06/2024     03/06/2024    BUN 14 03/06/2024    CREATININE 0.66 03/06/2024         _____________________________________________________  PHYSICAL EXAMINATION:  Vital signs: /70   Pulse 72   Ht 5' 5\" (1.651 m)   Wt 57.5 kg (126 lb 11.2 oz)   BMI 21.08 kg/m²   General: well developed and well nourished, " alert, oriented times 3, and appears comfortable  Psychiatric: Normal  HEENT: Trachea Midline, No torticollis  Cardiovascular: No discernable arrhythmia  Pulmonary: No wheezing or stridor  Abdomen: No rebound or guarding  Extremities: No peripheral edema  Skin: No masses, erythema, lacerations, fluctation, ulcerations  Neurovascular: Sensation Intact to the Median, Ulnar, Radial Nerve, Motor Intact to the Median, Ulnar, Radial Nerve, and Pulses Intact    MUSCULOSKELETAL EXAMINATION:  right wrist -  Patient presents with no obvious anatomical deformity  Skin is warm and dry to touch with no signs of erythema, ecchymosis, infection  ROM WNL  No TTP on exam  MMT: 5/5 throughout  No soft tissue swelling or effusion noted  Full FDS, FDP, extensor mechanisms are intact  - Thenar atrophy, - intrinsic atrophy  + Tinel's at carpal tunnel  - Phalen's sign  + Carpal Tunnel Compression  - AP Drawer  - LM Drawer  - Finklestein  - Ulnar / Radial impaction   - Stark's  Demonstrates WNL wrist, elbow, and shoulder motion  Forearm compartments are soft and supple  2+ Distal radial pulse with brisk capillary refill to the fingers  Radial, median, ulnar motor and sensory distribution intact  Sensation to light touch intact distally    Right Thumb:  Clicking in office today of right thumb   Nodule and swelling at A1 pulley of right thumb  Positive Shoulder sign  TTP over right thumb CMC  _____________________________________________________  STUDIES REVIEWED:  US of right wrist from 5/17/24 demonstrate findings suspicious for carpal tunnel syndrome. Bifid median nerve.      PROCEDURES PERFORMED:  Hand/upper extremity injection: R thumb A1  Universal Protocol:  Consent: Verbal consent obtained.  Consent given by: patient  Patient understanding: patient states understanding of the procedure being performed  Site marked: the operative site was marked  Patient identity confirmed: verbally with patient  Supporting  Documentation  Indications: joint swelling and pain   Procedure Details  Condition:trigger finger Location: thumb - R thumb A1   Preparation: Patient was prepped and draped in the usual sterile fashion  Needle size: 25 G  Ultrasound guidance: no  Approach: volar  Medications administered: 6 mg betamethasone acetate-betamethasone sodium phosphate 6 (3-3) mg/mL  Patient tolerance: patient tolerated the procedure well with no immediate complications  Dressing:  Sterile dressing applied         Scribe Attestation      I,:  Cata Bustos am acting as a scribe while in the presence of the attending physician.:       I,:  Arnie Lee MD personally performed the services described in this documentation    as scribed in my presence.:

## 2024-06-06 ENCOUNTER — OFFICE VISIT (OUTPATIENT)
Dept: PLASTIC SURGERY | Facility: CLINIC | Age: 64
End: 2024-06-06

## 2024-06-06 DIAGNOSIS — L59.8 RADIATION FIBROSIS OF SOFT TISSUE FROM THERAPEUTIC PROCEDURE: ICD-10-CM

## 2024-06-06 DIAGNOSIS — Z92.3 HISTORY OF RADIATION THERAPY: ICD-10-CM

## 2024-06-06 DIAGNOSIS — Z85.3 HISTORY OF RIGHT BREAST CANCER: ICD-10-CM

## 2024-06-06 DIAGNOSIS — N64.89 BREAST ASYMMETRY IN FEMALE: Primary | ICD-10-CM
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
## 2024-06-06 PROCEDURE — 99024 POSTOP FOLLOW-UP VISIT: CPT | Performed by: PLASTIC SURGERY

## 2024-06-06 NOTE — PROGRESS NOTES
[facility]  [address], [address], [place] [address]  [phone]     Patient Identification: Emily Hernandez is a 64 y.o. female      History of Present Illness: The patient is a 64 y.o.  year-old female  who presents to the office for post-op visit. Patient is 43 days s/p Fat Grafting To Right Breast. - Right and Left Mastopexy Breast For Symmetry - Left  on 3/13/2024.  Patient is accompanied by  at today's visit. She is wearing bra at all times and following restrictions.  Patient denies fevers, chills, signs of infection or significant pain. Applying scar cream daily.  Patient has no complaints at this time.     Medical History        Past Medical History:   Diagnosis Date    Abnormal Pap smear of cervix 2010     Followed up following year with normal pap smear    Anemia March 28,2022     Due to infusion therapy?    Asthma 2018 2018 to Jan 2021, i was diagnosed with asthma, jan 2021, i did nit have asthma according to the breathing test the asthma doctor performed, i get allergiy shot every 2 weeks for 2 different types of molds    Cancer (HCC)       breast    Diarrhea       occasional    Port-A-Cath in place       R side chest    Varicella 1971     I have not had it since then.               Review of Systems  Constitutional: Denies fevers, chills or pain.  Skin: Denies any warmth, erythema, edema or mucopurulent drainage. Bruising     Physical Exam     Breast: Surgical incisions are healing well. No hypertrophy or hyperpigmentation. Nipple perfusion is intact. There are no signs of infection, obvious hematoma, seroma or wound dehiscence. Breasts symmetric in shape and size. Continued mild scar tethering of right breast.        Assessment and Plan:  The patient is an 64 y.o.  year-old female who presents to the office for post-op edema. Patient is 43 days s/p Fat Grafting To Right Breast. - Right and Left Mastopexy Breast For Symmetry - Left  on  3/13/2024      -Breast incision sites healing well. Breasts symmetrical in size and shape  -Apply Aquaphor to incision sites daily. Discussed daily scar massage and use of silicone scar gel/tape to promote scar softening and flattening. The patient was educated on scar care and that it can take up to 1 year for full scar maturation.  -Discussed possibility of second round of fat grafting, but patient has minimal donor sites.   -Patient doing well, desires to f/u on a prn basis.  -The patient is to call the office with any questions or concerns. All of the patient's questions were answered at this time and they agree with the plan of care.

## 2024-06-09 RX ORDER — BETAMETHASONE SODIUM PHOSPHATE AND BETAMETHASONE ACETATE 3; 3 MG/ML; MG/ML
6 INJECTION, SUSPENSION INTRA-ARTICULAR; INTRALESIONAL; INTRAMUSCULAR; SOFT TISSUE
Status: COMPLETED | OUTPATIENT
Start: 2024-06-05 | End: 2024-06-05

## 2024-06-11 ENCOUNTER — OFFICE VISIT (OUTPATIENT)
Dept: PHYSICAL THERAPY | Facility: REHABILITATION | Age: 64
End: 2024-06-11
Payer: COMMERCIAL

## 2024-06-11 DIAGNOSIS — M62.89 PELVIC FLOOR DYSFUNCTION: ICD-10-CM

## 2024-06-11 DIAGNOSIS — N81.11 MIDLINE CYSTOCELE: Primary | ICD-10-CM

## 2024-06-11 PROCEDURE — 97110 THERAPEUTIC EXERCISES: CPT

## 2024-06-11 NOTE — PROGRESS NOTES
Daily Note     Today's date: 2024  Patient name: Emily Hernandez  : 1960  MRN: [id number]  Referring provider: Mattie Ferrer MD  Dx:   Encounter Diagnosis     ICD-10-CM    1. Midline cystocele  N81.11       2. Pelvic floor dysfunction  M62.89             Subjective: Feels good about performing her exercises daily vs 4x a week.     Objective: See treatment diary below.    Assessment: Tolerated treatment well. Due to recent progression, no major changes done to program except for addition of weight.  Pt continues to benefit from tactile cueing to improve her form and pace.  Next session add more UE exercises she can do when watching tv. Pt owns weights 2x8# 1#-10# goes over wrists, and 15#.     Plan: Continue per plan of care.      Access Code: [email or web address]  URL: [email or web address]  Date: 2024  Prepared by: Rekha Gentile    Program Notes  Exhale and engage your pelvic floor muscles as you go against gravity.    Exercises  - Goblet Squat with Kettlebell  - 1 x daily - 30 reps  - Upright Side Lunge  - 1 x daily - 30 reps  - Standing Heel Raise  - 1 x daily - 30 reps  - Chest Press with Resistance  - 4 x weekly - 20 reps  - Shoulder Extension with Resistance  - 4 x weekly - 20 reps  - Standing Bicep Curl with Pelvic Floor Contraction  - 4 x weekly - 20 reps  - Quadruped Exhale with Pelvic Floor Contraction  - 4 x weekly - 20 reps  - Quadruped Pelvic Floor Contraction with Opposite Arm and Leg Lift  - 4 x weekly - 10 reps - 3 hold  - Standing Anti-Rotation Press with Anchored Resistance  - 1 x daily - 30 reps  - Plank with Hip Extension  - 1 x daily - 30 reps  - Plank with Hip Abduction  - 1 x daily - 30 reps  - Full Plank with Shoulder Taps  - 1 x daily - 30 reps    Precautions: h/o breast CA    POC expires  Auth Status? (BOMN, approved, pending) Unit limit (Daily) Auth Start date Expiration date PT/OT + Visit Limit?   24          Date of Service 5/15 5/21 5/29 6/4 6/11   Visits  "Used 3 4 5 6 7   Visits Remaining 99 [phone] 99   Patient Education        [employer] acct created?        PFM anatomy and function        POP                Neuro Re-Ed        Urge deferral Reviewed & practiced       Defecation mechanics Loose bowels / no constipation       Pelvic drop        PF elevator        DB 10'               Ther Ex        PFM strengthening- lower quarter Bridge against wall 20x, single leg bridge 20  PFM contraction 20x Bridge w/ add 2x10  Q-ped 2x10  Q-ped hip hinge 10x  Q-ped arm lift 10x  Q-ped leg lift 10x  Q-ped bird dog 10x Bridge w/ add 2x10  Q-ped 2x10  Q-ped hip hinge 10x  Q-ped bird dog 10x Heel raise + PF x20  Lateral lunge 10# KB x30 each  Squat 15# x30 each  Lateral step up 8\" x30 alt Heel raise + PF x20  Lateral lunge 10# KB x30 each  Squat 15# x30 each  Lateral step up 8\" x30 al w/ 8#   PFM strengthening- abdominal wall    Incline plank w variations 4x1 min  Standing palov press 10# x30 w march x30   Incline plank w variations 4x1 min  Standing palov press 10# x30 w march x30   PFM strengthening- upper quarter Bicep curls 5# 2x10 Bicep curls 5# 2x10  Chest press supine 3# 2x10 Chest press 15# band x20  Row 15# band x20  LPD 15# band x20 Chest press 15# band x20  Row 15# band x20  LPD 15# band x20 Chest press 15# band x30  Row 15# band x30 while sitting on pball  LPD 15# band x30   Squats against wall 20x  10x 5\" x10 w exhale    Not on wall x10     Upright bike    6' L 6 6' L6   UBE    L1 6' L1 6'   Ther Activity        Lifting mechanics   12.5#, 22.5#, 32.5# from floor to table  30# KB lift and twist, 30# carry around gym 20'                     Manual Ther        PFM exam   completed     Ortho exam                                                "

## 2024-06-18 ENCOUNTER — OFFICE VISIT (OUTPATIENT)
Dept: PHYSICAL THERAPY | Facility: REHABILITATION | Age: 64
End: 2024-06-18
Payer: COMMERCIAL

## 2024-06-18 DIAGNOSIS — M62.89 PELVIC FLOOR DYSFUNCTION: ICD-10-CM

## 2024-06-18 DIAGNOSIS — N81.11 MIDLINE CYSTOCELE: Primary | ICD-10-CM

## 2024-06-18 PROCEDURE — 97110 THERAPEUTIC EXERCISES: CPT | Performed by: PHYSICAL THERAPIST

## 2024-06-18 NOTE — PROGRESS NOTES
Daily Note     Today's date: 2024  Patient name: Emily Hernandez  : 1960  MRN: [id number]  Referring provider: Mattie Ferrer MD  Dx:   Encounter Diagnosis     ICD-10-CM    1. Midline cystocele  N81.11       2. Pelvic floor dysfunction  M62.89           Subjective: No new complaints. If she thinks about it, she feels a vague fullness sensationa vaginally.    Objective: See treatment diary below.    Assessment: Tolerated treatment well. Reviewed upper quarter strengthening exercises per patient request, and reviewed exercise principles to achieve appropriate overload.    Plan: Continue per plan of care.      Access Code: [email or web address]  URL: [email or web address]  Date: 2024  Prepared by: Rekha Gentile    Program Notes  Exhale and engage your pelvic floor muscles as you go against gravity.    Exercises  - Goblet Squat with Kettlebell  - 1 x daily - 30 reps  - Upright Side Lunge  - 1 x daily - 30 reps  - Standing Heel Raise  - 1 x daily - 30 reps  - Chest Press with Resistance  - 4 x weekly - 20 reps  - Shoulder Extension with Resistance  - 4 x weekly - 20 reps  - Standing Bicep Curl with Pelvic Floor Contraction  - 4 x weekly - 20 reps  - Quadruped Exhale with Pelvic Floor Contraction  - 4 x weekly - 20 reps  - Quadruped Pelvic Floor Contraction with Opposite Arm and Leg Lift  - 4 x weekly - 10 reps - 3 hold  - Standing Anti-Rotation Press with Anchored Resistance  - 1 x daily - 30 reps  - Plank with Hip Extension  - 1 x daily - 30 reps  - Plank with Hip Abduction  - 1 x daily - 30 reps  - Full Plank with Shoulder Taps  - 1 x daily - 30 reps    Precautions: h/o breast CA    POC expires  Auth Status? (BOMN, approved, pending) Unit limit (Daily) Auth Start date Expiration date PT/OT + Visit Limit?   24          Date of Service    Visits Used 5 6 7 8   Visits Remaining 99 [phone] 99   Patient Education       [facility] acct created?       PFM anatomy and function      "  POP              Neuro Re-Ed       Urge deferral       Defecation mechanics       Pelvic drop       PF elevator       DB              Ther Ex       PFM strengthening- lower quarter Bridge w/ add 2x10  Q-ped 2x10  Q-ped hip hinge 10x  Q-ped bird dog 10x Heel raise + PF x20  Lateral lunge 10# KB x30 each  Squat 15# x30 each  Lateral step up 8\" x30 alt Heel raise + PF x20  Lateral lunge 10# KB x30 each  Squat 15# x30 each  Lateral step up 8\" x30 al w/ 8#    PFM strengthening- abdominal wall  Incline plank w variations 4x1 min  Standing palov press 10# x30 w march x30   Incline plank w variations 4x1 min  Standing palov press 10# x30 w march x30    PFM strengthening- upper quarter Chest press 15# band x20  Row 15# band x20  LPD 15# band x20 Chest press 15# band x20  Row 15# band x20  LPD 15# band x20 Chest press 15# band x30  Row 15# band x30 while sitting on pball  LPD 15# band x30 Bicep curl 8# 2x10  OH press 8# 2x8  Lateral raise 8# 2x6  Bent over triceps ext 8# uni 2x8   Squats against wall 5\" x10 w exhale    Not on wall x10      Upright bike  6' L 6 6' L6    UBE  L1 6' L1 6' L1 7'   Ther Activity       Lifting mechanics 12.5#, 22.5#, 32.5# from floor to table  30# KB lift and twist, 30# carry around gym 20'                    Manual Ther       PFM exam completed      Ortho exam                                           "

## 2024-06-24 ENCOUNTER — OFFICE VISIT (OUTPATIENT)
Dept: PHYSICAL THERAPY | Facility: REHABILITATION | Age: 64
End: 2024-06-24
Payer: COMMERCIAL

## 2024-06-24 DIAGNOSIS — M62.89 PELVIC FLOOR DYSFUNCTION: ICD-10-CM

## 2024-06-24 DIAGNOSIS — N81.11 MIDLINE CYSTOCELE: Primary | ICD-10-CM

## 2024-06-24 PROCEDURE — 97110 THERAPEUTIC EXERCISES: CPT | Performed by: PHYSICAL THERAPIST

## 2024-06-24 PROCEDURE — 97750 PHYSICAL PERFORMANCE TEST: CPT | Performed by: PHYSICAL THERAPIST

## 2024-06-24 NOTE — PROGRESS NOTES
Discharge     Today's date: 2024  Patient name: Emily Hernandez  : 1960  MRN: [id number]  Referring provider: Mattie Ferrer MD  Dx:   Encounter Diagnosis     ICD-10-CM    1. Midline cystocele  N81.11       2. Pelvic floor dysfunction  M62.89             Subjective: Performed exercises 3x in the last week.    Objective: PFM strength 4/5, mildly lacking anteriorly; bulge-anterior vaginal tissue descends to introitus with bear down/jose gavi    Assessment: Tolerated treatment well. PFM strength has overall improved. At this time, POP symptoms are mild, she is independent in breath coordination and PF co-contraction with challenging or high-load functional activities to manage IAP and its effect on POP.    Goals  Patient will demonstrate improved PFM strength to at least 4/5 in 8 weeks. MET  Patient will report improvement in symptoms of vaginal bulge by 50% in 8 weeks. MET  Patient will demonstrate independent and consistent pelvic brace with lifting > 15lb objects in 4 weeks. MET    Plan: discharge today.    Access Code: [email or web address]  URL: [email or web address]  Date: 2024  Prepared by: Rekha Steimling    Program Notes  Exhale and engage your pelvic floor muscles as you go against gravity.    Exercises  - Goblet Squat with Kettlebell  - 1 x daily - 30 reps  - Upright Side Lunge  - 1 x daily - 30 reps  - Standing Heel Raise  - 1 x daily - 30 reps  - Chest Press with Resistance  - 4 x weekly - 20 reps  - Shoulder Extension with Resistance  - 4 x weekly - 20 reps  - Standing Bicep Curl with Pelvic Floor Contraction  - 4 x weekly - 20 reps  - Quadruped Exhale with Pelvic Floor Contraction  - 4 x weekly - 20 reps  - Quadruped Pelvic Floor Contraction with Opposite Arm and Leg Lift  - 4 x weekly - 10 reps - 3 hold  - Standing Anti-Rotation Press with Anchored Resistance  - 1 x daily - 30 reps  - Plank with Hip Extension  - 1 x daily - 30 reps  - Plank with Hip Abduction  - 1 x daily - 30  "reps  - Full Plank with Shoulder Taps  - 1 x daily - 30 reps      Access Code: [email or web address]  URL: [email or web address]  Date: 06/24/2024  Prepared by: Rekha Steimling    Exercises  - Mini Squat with Pelvic Floor Contraction  - 3 x weekly - 30 reps  - Full Lunge with Pelvic Floor Contractions  - 3 x weekly - 30 reps  - Standing heel raises with pelvic floor contraction  - 3 x weekly - 30 reps  - Lateral Step Up  - 3 x weekly - 30 reps  - Runner's Step Up/Down  - 3 x weekly - 30 reps  - Supine Bridge  - 3 x weekly - 30 reps    Precautions: h/o breast CA    POC expires  Auth Status? (BOMN, approved, pending) Unit limit (Daily) Auth Start date Expiration date PT/OT + Visit Limit?   6/25/24          Date of Service 5/29 6/4 6/11 6/19 6/24   Visits Used 5 6 7 8 9   Visits Remaining 99 [phone] 99   Patient Education        [facility] acct created?        PFM anatomy and function        POP                Neuro Re-Ed        Urge deferral        Defecation mechanics        Pelvic drop        PF elevator        DB                Ther Ex        PFM strengthening- lower quarter Bridge w/ add 2x10  Q-ped 2x10  Q-ped hip hinge 10x  Q-ped bird dog 10x Heel raise + PF x20  Lateral lunge 10# KB x30 each  Squat 15# x30 each  Lateral step up 8\" x30 alt Heel raise + PF x20  Lateral lunge 10# KB x30 each  Squat 15# x30 each  Lateral step up 8\" x30 al w/ 8#  Reviewed- heel raise, squat, lunge 3-way, fwd step up, lateral step up, bridgere   PFM strengthening- abdominal wall  Incline plank w variations 4x1 min  Standing palov press 10# x30 w march x30   Incline plank w variations 4x1 min  Standing palov press 10# x30 w march x30  Reviewed quadruped position or incline plank   PFM strengthening- upper quarter Chest press 15# band x20  Row 15# band x20  LPD 15# band x20 Chest press 15# band x20  Row 15# band x20  LPD 15# band x20 Chest press 15# band x30  Row 15# band x30 while sitting on pball  LPD 15# band x30 Bicep curl 8# " "2x10  OH press 8# 2x8  Lateral raise 8# 2x6  Bent over triceps ext 8# uni 2x8 reviewed   Squats against wall 5\" x10 w exhale    Not on wall x10       Upright bike  6' L 6 6' L6     UBE  L1 6' L1 6' L1 7'    Ther Activity        Lifting mechanics 12.5#, 22.5#, 32.5# from floor to table  30# KB lift and twist, 30# carry around gym 20'                       Manual Ther        PFM exam completed    completed   Ortho exam                                                "

## 2024-06-24 NOTE — LETTER
Dr. Ferrer:    It was my pleasure to treat your patient, Emily, at the PHYSICAL THERAPY AT [address] for PELVIC ORGAN PROLAPSE SYMPTOMS.  They have discharged from physical therapy because they have achieved their goals. Please see below documentation from our final treatment session. Emily knows that they can return to physical therapy if their symptoms should return or if they should need further guidance in maintaining their goals.    Sincerely,  Rekha Gentile, PT    Discharge     Today's date: 2024  Patient name: Emily Hernandez  : 1960  MRN: [id number]  Referring provider: Mattie Ferrer MD  Dx:   Encounter Diagnosis     ICD-10-CM    1. Midline cystocele  N81.11       2. Pelvic floor dysfunction  M62.89             Subjective: Performed exercises 3x in the last week.    Objective: PFM strength 4/5, mildly lacking anteriorly; bulge-anterior vaginal tissue descends to introitus with bear down/jose gavi    Assessment: Tolerated treatment well. PFM strength has overall improved. At this time, POP symptoms are mild, she is independent in breath coordination and PF co-contraction with challenging or high-load functional activities to manage IAP and its effect on POP.    Goals  Patient will demonstrate improved PFM strength to at least 4/5 in 8 weeks. MET  Patient will report improvement in symptoms of vaginal bulge by 50% in 8 weeks. MET  Patient will demonstrate independent and consistent pelvic brace with lifting > 15lb objects in 4 weeks. MET    Plan: discharge today.    Access Code: [email or web address]  URL: [email or web address]  Date: 2024  Prepared by: Rekha Gentile    Program Notes  Exhale and engage your pelvic floor muscles as you go against gravity.    Exercises  - Goblet Squat with Kettlebell  - 1 x daily - 30 reps  - Upright Side Lunge  - 1 x daily - 30 reps  - Standing Heel Raise  - 1 x daily - 30 reps  - Chest Press with Resistance  - 4 x weekly - 20 reps  -  "Shoulder Extension with Resistance  - 4 x weekly - 20 reps  - Standing Bicep Curl with Pelvic Floor Contraction  - 4 x weekly - 20 reps  - Quadruped Exhale with Pelvic Floor Contraction  - 4 x weekly - 20 reps  - Quadruped Pelvic Floor Contraction with Opposite Arm and Leg Lift  - 4 x weekly - 10 reps - 3 hold  - Standing Anti-Rotation Press with Anchored Resistance  - 1 x daily - 30 reps  - Plank with Hip Extension  - 1 x daily - 30 reps  - Plank with Hip Abduction  - 1 x daily - 30 reps  - Full Plank with Shoulder Taps  - 1 x daily - 30 reps      Access Code: [email or web address]  URL: [email or web address]  Date: 06/24/2024  Prepared by: Rekha Steimling    Exercises  - Mini Squat with Pelvic Floor Contraction  - 3 x weekly - 30 reps  - Full Lunge with Pelvic Floor Contractions  - 3 x weekly - 30 reps  - Standing heel raises with pelvic floor contraction  - 3 x weekly - 30 reps  - Lateral Step Up  - 3 x weekly - 30 reps  - Runner's Step Up/Down  - 3 x weekly - 30 reps  - Supine Bridge  - 3 x weekly - 30 reps    Precautions: h/o breast CA    POC expires  Auth Status? (BOMN, approved, pending) Unit limit (Daily) Auth Start date Expiration date PT/OT + Visit Limit?   6/25/24          Date of Service 5/29 6/4 6/11 6/19 6/24   Visits Used 5 6 7 8 9   Visits Remaining 99 [phone] 99   Patient Education        [facility] acct created?        PFM anatomy and function        POP                Neuro Re-Ed        Urge deferral        Defecation mechanics        Pelvic drop        PF elevator        DB                Ther Ex        PFM strengthening- lower quarter Bridge w/ add 2x10  Q-ped 2x10  Q-ped hip hinge 10x  Q-ped bird dog 10x Heel raise + PF x20  Lateral lunge 10# KB x30 each  Squat 15# x30 each  Lateral step up 8\" x30 alt Heel raise + PF x20  Lateral lunge 10# KB x30 each  Squat 15# x30 each  Lateral step up 8\" x30 al w/ 8#  Reviewed- heel raise, squat, lunge 3-way, fwd step up, lateral step up, bridgere   PFM " "strengthening- abdominal wall  Incline plank w variations 4x1 min  Standing palov press 10# x30 w march x30   Incline plank w variations 4x1 min  Standing palov press 10# x30 w march x30  Reviewed quadruped position or incline plank   PFM strengthening- upper quarter Chest press 15# band x20  Row 15# band x20  LPD 15# band x20 Chest press 15# band x20  Row 15# band x20  LPD 15# band x20 Chest press 15# band x30  Row 15# band x30 while sitting on pball  LPD 15# band x30 Bicep curl 8# 2x10  OH press 8# 2x8  Lateral raise 8# 2x6  Bent over triceps ext 8# uni 2x8 reviewed   Squats against wall 5\" x10 w exhale    Not on wall x10       Upright bike  6' L 6 6' L6     UBE  L1 6' L1 6' L1 7'    Ther Activity        Lifting mechanics 12.5#, 22.5#, 32.5# from floor to table  30# KB lift and twist, 30# carry around gym 20'                       Manual Ther        PFM exam completed    completed   Ortho exam                                                 "

## 2024-06-25 ENCOUNTER — APPOINTMENT (OUTPATIENT)
Dept: PHYSICAL THERAPY | Facility: REHABILITATION | Age: 64
End: 2024-06-25
Payer: COMMERCIAL

## 2024-07-03 ENCOUNTER — HOSPITAL ENCOUNTER (OUTPATIENT)
Dept: INFUSION CENTER | Facility: CLINIC | Age: 64
Discharge: HOME | End: 2024-07-03
Payer: COMMERCIAL

## 2024-07-03 DIAGNOSIS — C50.111 MALIGNANT NEOPLASM OF CENTRAL PORTION OF RIGHT BREAST IN FEMALE, ESTROGEN RECEPTOR POSITIVE (HCC): ICD-10-CM

## 2024-07-03 DIAGNOSIS — Z95.828 PORT-A-CATH IN PLACE: Primary | ICD-10-CM

## 2024-07-03 DIAGNOSIS — Z17.0 MALIGNANT NEOPLASM OF CENTRAL PORTION OF RIGHT BREAST IN FEMALE, ESTROGEN RECEPTOR POSITIVE (HCC): ICD-10-CM

## 2024-07-03 LAB
ALBUMIN SERPL BCG-MCNC: 4.3 G/DL (ref 3.5–5)
ALP SERPL-CCNC: 34 U/L (ref 34–104)
ALT SERPL W P-5'-P-CCNC: 13 U/L (ref 7–52)
ANION GAP SERPL CALCULATED.3IONS-SCNC: 7 MMOL/L (ref 4–13)
AST SERPL W P-5'-P-CCNC: 19 U/L (ref 13–39)
BASOPHILS # BLD AUTO: 0.08 THOUSANDS/ÂΜL (ref 0–0.1)
BASOPHILS NFR BLD AUTO: 1 % (ref 0–1)
BILIRUB SERPL-MCNC: 0.51 MG/DL (ref 0.2–1)
BUN SERPL-MCNC: 15 MG/DL (ref 5–25)
CALCIUM SERPL-MCNC: 9.9 MG/DL (ref 8.4–10.2)
CHLORIDE SERPL-SCNC: 103 MMOL/L (ref 96–108)
CO2 SERPL-SCNC: 28 MMOL/L (ref 21–32)
CREAT SERPL-MCNC: 0.72 MG/DL (ref 0.6–1.3)
EOSINOPHIL # BLD AUTO: 0.4 THOUSAND/ÂΜL (ref 0–0.61)
EOSINOPHIL NFR BLD AUTO: 7 % (ref 0–6)
ERYTHROCYTE [DISTWIDTH] IN BLOOD BY AUTOMATED COUNT: 11.7 % (ref 11.6–15.1)
GFR SERPL CREATININE-BSD FRML MDRD: 89 ML/MIN/1.73SQ M
GLUCOSE SERPL-MCNC: 97 MG/DL (ref 65–140)
HCT VFR BLD AUTO: 39.8 % (ref 34.8–46.1)
HGB BLD-MCNC: 13.1 G/DL (ref 11.5–15.4)
IMM GRANULOCYTES # BLD AUTO: 0 THOUSAND/UL (ref 0–0.2)
IMM GRANULOCYTES NFR BLD AUTO: 0 % (ref 0–2)
LYMPHOCYTES # BLD AUTO: 1.44 THOUSANDS/ÂΜL (ref 0.6–4.47)
LYMPHOCYTES NFR BLD AUTO: 26 % (ref 14–44)
MCH RBC QN AUTO: 31.9 PG (ref 26.8–34.3)
MCHC RBC AUTO-ENTMCNC: 32.9 G/DL (ref 31.4–37.4)
MCV RBC AUTO: 97 FL (ref 82–98)
MONOCYTES # BLD AUTO: 0.4 THOUSAND/ÂΜL (ref 0.17–1.22)
MONOCYTES NFR BLD AUTO: 7 % (ref 4–12)
NEUTROPHILS # BLD AUTO: 3.25 THOUSANDS/ÂΜL (ref 1.85–7.62)
NEUTS SEG NFR BLD AUTO: 59 % (ref 43–75)
NRBC BLD AUTO-RTO: 0 /100 WBCS
PLATELET # BLD AUTO: 222 THOUSANDS/UL (ref 149–390)
PMV BLD AUTO: 9.5 FL (ref 8.9–12.7)
POTASSIUM SERPL-SCNC: 4.4 MMOL/L (ref 3.5–5.3)
PROT SERPL-MCNC: 6.7 G/DL (ref 6.4–8.4)
RBC # BLD AUTO: 4.11 MILLION/UL (ref 3.81–5.12)
SODIUM SERPL-SCNC: 138 MMOL/L (ref 135–147)
WBC # BLD AUTO: 5.57 THOUSAND/UL (ref 4.31–10.16)

## 2024-07-03 PROCEDURE — 85025 COMPLETE CBC W/AUTO DIFF WBC: CPT

## 2024-07-03 PROCEDURE — 80053 COMPREHEN METABOLIC PANEL: CPT

## 2024-07-03 PROCEDURE — 86300 IMMUNOASSAY TUMOR CA 15-3: CPT

## 2024-07-04 LAB — CANCER AG27-29 SERPL-ACNC: 9.7 U/ML (ref 0–38.6)

## 2024-07-08 ENCOUNTER — TELEPHONE (OUTPATIENT)
Age: 64
End: 2024-07-08

## 2024-07-08 ENCOUNTER — OFFICE VISIT (OUTPATIENT)
Dept: RADIATION ONCOLOGY | Facility: CLINIC | Age: 64
End: 2024-07-08
Attending: RADIOLOGY
Payer: COMMERCIAL

## 2024-07-08 VITALS
BODY MASS INDEX: 20.99 KG/M2 | HEIGHT: 65 IN | OXYGEN SATURATION: 98 % | RESPIRATION RATE: 18 BRPM | SYSTOLIC BLOOD PRESSURE: 121 MMHG | HEART RATE: 67 BPM | DIASTOLIC BLOOD PRESSURE: 83 MMHG | WEIGHT: 126 LBS | TEMPERATURE: 97.1 F

## 2024-07-08 DIAGNOSIS — Z17.0 MALIGNANT NEOPLASM OF CENTRAL PORTION OF RIGHT BREAST IN FEMALE, ESTROGEN RECEPTOR POSITIVE (HCC): Primary | ICD-10-CM

## 2024-07-08 DIAGNOSIS — C50.111 MALIGNANT NEOPLASM OF CENTRAL PORTION OF RIGHT BREAST IN FEMALE, ESTROGEN RECEPTOR POSITIVE (HCC): Primary | ICD-10-CM

## 2024-07-08 PROCEDURE — 99211 OFF/OP EST MAY X REQ PHY/QHP: CPT | Performed by: RADIOLOGY

## 2024-07-08 PROCEDURE — 99213 OFFICE O/P EST LOW 20 MIN: CPT | Performed by: INTERNAL MEDICINE

## 2024-07-08 NOTE — PROGRESS NOTES
Follow-up - Radiation Oncology   Emily Hernandez 1960 64 y.o. female [id number]    Cancer Staging   Malignant neoplasm of central portion of right breast in female, estrogen receptor positive (HCC)  Staging form: Breast, AJCC 8th Edition  - Clinical stage from 4/6/2022: Stage IB (cT2, cN0, cM0, G2, ER+, NM+, HER2+) - Signed by Azalea Motta MD on 4/6/2022  Stage prefix: Initial diagnosis  Method of lymph node assessment: Clinical  Histologic grading system: 3 grade system  - Pathologic stage from 10/18/2022: No Stage Recommended (ypT1c, pN0(sn), cM0, G2, ER+, NM+, HER2+) - Signed by Azalea Motta MD on 10/18/2022  Stage prefix: Post-therapy  Method of lymph node assessment: [place] lymph node biopsy  Histologic grading system: 3 grade system    Assessment/Plan:  Emily Hernandez is a 64 y.o. female former patient of Dr. Henry and Dr. Baxter, who completed radiation to the right breast in December 2022.    She presents for routine follow-up now 1 year and 7 months after completion of radiation therapy.  She denies symptoms related to the breast.  Her interval mammography has been negative.  She continues follow-up with medical and surgical oncology.  7/11/24  Surgical Oncology  7/31/24  Plastic Surgery  RTC in 1 year or as needed, she prefers the latter.   Continue breast survivorship.    Kurt Muller MD  Department of Radiation Oncology  [facility]    Total Time Spent  20 minutes spent reviewing EMR in preparation for visit, with the patient, coordination with other providers, and documentation.    No orders of the defined types were placed in this encounter.       History of Present Illness   Interval History:  Emily Hernandez 1960 is a 64 y.o. female  with triple positive invasive ductal carcinoma of the right breast. She is s/p neoadjuvant chemo followed by lumpectomy and sentinel lymph node biopsy. She completed radiation to the right breast 12/9/22.  Last seen in radiation  oncology on 23 with Dr. Baxter     1/10/24 Dr. Motta  Remains on anastrozole. No concerns on exam.     24  Mammogram  IMPRESSION:   No suspicious findings.  ASSESSMENT/BI-RADS CATEGORY:  Overall: 2 - Benign  RECOMMENDATION:       - Diagnostic mammogram in 1 year for both breasts.     3/13/24  [facility]/Dr. Diamond, Plastic Surgery  Procedure:FAT GRAFTING TO RIGHT BREAST. (Right: Breast)                     LEFT MASTOPEXY BREAST FOR SYMMETRY (Left: Breas t)     24  [facility], Plastic Surgery  No complaints.  Applying scar cream daily     Upcomin24  Surgical Oncology  24  Plastic Surgery     4/10/24  Dr. Jason  Continues with letrazole.  F/U in 3 months    She feels well overall. No concerns related to the breast.    Historical Information   Oncology History   Malignant neoplasm of central portion of right breast in female, estrogen receptor positive (HCC)   3/29/2022 Biopsy    Right breast biopsy:  - Invasive ductal carcinoma  Grade 2  ER 70%   UT 70%  HER2 positive     2022 -  Cancer Staged    Staging form: Breast, AJCC 8th Edition  - Clinical stage from 2022: Stage IB (cT2, cN0, cM0, G2, ER+, [place]+, HER2+) - Signed by Azalea Motta MD on 2022  Stage prefix: Initial diagnosis  Method of lymph node assessment: Clinical  Histologic grading system: 3 grade system       2022 -  Chemotherapy    Pegfilgrastim-bmez (ZIEXTENZO), 6 mg, Subcutaneous, Once, 6 of 6 cycles  Administration: 6 mg (2022), 6 mg (2022), 6 mg (2022), 6 mg (2022), 6 mg (2022), 6 mg (2022)  fosaprepitant (EMEND) IVPB, 150 mg, Intravenous, Once, 6 of 6 cycles  Administration: 150 mg (2022), 150 mg (2022), 150 mg (6/15/2022), 150 mg (2022), 150 mg (2022), 150 mg (2022)  pertuzumab (PERJETA) IVPB, 840 mg, Intravenous, Once, 18 of 18 cycles  Administration: 840 mg (2022), 420 mg (2022), 420 mg (2022), 420 mg (6/15/2022), 420 mg (2022), 420 mg  (7/27/2022), 420 mg (8/17/2022), 420 mg (9/28/2022), 420 mg (10/19/2022), 420 mg (11/9/2022), 420 mg (11/30/2022), 420 mg (12/21/2022), 420 mg (1/11/2023), 420 mg (2/1/2023), 420 mg (2/22/2023), 420 mg (3/15/2023), 420 mg (4/5/2023), 420 mg (4/26/2023)  CARBOplatin (PARAPLATIN) IVPB ([facility] DOSING), 588.6 mg, Intravenous, Once, 6 of 6 cycles  Administration: 588.6 mg (5/4/2022), 588.6 mg (5/25/2022), 588.6 mg (6/15/2022), 582.6 mg (7/6/2022), 588.6 mg (7/27/2022), 582.6 mg (8/17/2022)  trastuzumab-qyyp (TRAZIMERA) chemo infusion, 6 mg/kg = 330 mg (100 % of original dose 6 mg/kg), Intravenous, Once, 11 of 11 cycles  Dose modification: 6 mg/kg (original dose 6 mg/kg, Cycle 8), 6 mg/kg (original dose 6 mg/kg, Cycle 9), 6 mg/kg (original dose 6 mg/kg, Cycle 12)  Administration: 330 mg (9/28/2022), 330 mg (10/19/2022), 330 mg (11/9/2022), 361 mg (11/30/2022), 361 mg (12/21/2022), 361 mg (1/11/2023), 361 mg (2/1/2023), 361 mg (2/22/2023), 361 mg (3/15/2023), 361 mg (4/5/2023), 361 mg (4/26/2023)  DOCEtaxel (TAXOTERE) chemo infusion, 75 mg/m2 = 120 mg, Intravenous, Once, 6 of 6 cycles  Administration: 120 mg (5/4/2022), 120 mg (5/25/2022), 120 mg (6/15/2022), 120 mg (7/6/2022), 120 mg (7/27/2022), 120 mg (8/17/2022)  trastuzumab (HERCEPTIN) chemo infusion, 439 mg, Intravenous, Once, 7 of 7 cycles  Administration: 450 mg (5/4/2022), 330 mg (5/25/2022), 330 mg (9/7/2022), 330 mg (6/15/2022), 330 mg (7/6/2022), 330 mg (7/27/2022), 330 mg (8/17/2022)     8/2022 Genetic Testing    [facility] BRCAplus STAT Panel (8 genes): ASHU, BRCA1, BRCA2, CDH1, CHEK2, PALB2, PTEN, TP53 with reflex to [facility] CancerNext + RNA (28 additional genes): APC, AXIN2 BARD1, BRIP1, BMPR1A, CDK4, CDKN2A, DICER1, EPCAM, GREM1, HOXB13, MLH1, MSH2, MSH3, MSH6, MUTYH, NBN, NF1, NTHL1, PMS2, POLD1, POLE, RAD51C, RAD51D, RECQL SMAD4, SMARCA4, STK11    Negative     9/30/2022 Surgery    Right lumpectomy with sentinel lymph node biopsy:  - clear margins  - 0/8 lymph  nodes    Dr. Motta     10/18/2022 -  Cancer Staged    Staging form: Breast, AJCC 8th Edition  - Pathologic stage from 10/18/2022: No Stage Recommended (ypT1c, pN0(sn), cM0, G2, ER+, [place]+, HER2+) - Signed by Azalea Motta MD on 10/18/2022  Stage prefix: Post-therapy  Method of lymph node assessment: [place] lymph node biopsy  Histologic grading system: 3 grade system       11/11/2022 - 12/9/2022 Radiation    Plan ID Energy Fractions Dose per Fraction (cGy) Dose Correction (cGy) Total Dose Delivered (cGy) Elapsed Days   R Breast 6X 16 / 16 266 0 4,256 24   R Brst Boost 12E 4 / 4 250 0 1,000 3   Dr. Henry     11/2022 -  Hormone Therapy    Letrozole   Dr. Jason       Past Medical History:   Diagnosis Date    Abnormal Pap smear of cervix 2010    Followed up following year with normal pap smear    Anemia March 28,2022    Due to infusion therapy?    Asthma 2018 2018 to Jan 2021, i was diagnosed with asthma, jan 2021, i did nit have asthma according to the breathing test the asthma doctor performed, i get allergiy shot every 2 weeks for 2 different types of molds    Cancer (HCC)     breast    Diarrhea     occasional    Port-A-Cath in place     R side chest    Varicella 1971    I have not had it since then.     Past Surgical History:   Procedure Laterality Date    BREAST BIOPSY Right 03/29/2022    [facility]    BREAST LUMPECTOMY Right 09/30/2022    Procedure: ML BREAST;  Surgeon: Azalea Motta MD;  Location: [facility];  Service: Surgical Oncology    CATARACT EXTRACTION Left 2019    with lens implant    IR PORT PLACEMENT  04/20/2022    LYMPH NODE BIOPSY Right 09/30/2022    Procedure: SLN BX;  Surgeon: Azalea Motta MD;  Location: [facility];  Service: Surgical Oncology    MANDIBLE SURGERY      jaw surgery for crooked jaw 1993, 1998    [place] GRAFTING OF AUTOLOGOUS FAT BY LIPO 50 CC OR LESS Right 3/13/2024    Procedure: FAT GRAFTING TO RIGHT BREAST.;  Surgeon: Zandra Diamond MD;  [facility];  Service: Plastics     OR MASTOPEXY Left 3/13/2024    Procedure: LEFT MASTOPEXY BREAST FOR SYMMETRY;  Surgeon: Zandra Diamond MD;  [facility];  Service: Plastics    ROTATOR CUFF REPAIR Right 2020    US BREAST CLIP NEEDLE LOC RIGHT Right 03/29/2022    US GUIDED BREAST BIOPSY RIGHT COMPLETE Right 03/29/2022     Social History   Social History     Substance and Sexual Activity   Alcohol Use Not Currently     Social History     Substance and Sexual Activity   Drug Use Never     Social History     Tobacco Use   Smoking Status Never    Passive exposure: Never   Smokeless Tobacco Never       Meds/Allergies     Current Outpatient Medications:     albuterol (2.5 mg/3 mL) 0.083 % nebulizer solution, USE 1 VIAL IN NEBULIZER EVERY 4 HOURS AS NEEDED FOR WHEEZING, Disp: , Rfl:     albuterol (PROVENTIL HFA,VENTOLIN HFA) 90 mcg/act inhaler, , Disp: , Rfl:     cetirizine (ZyrTEC) 10 mg tablet, Take 10 mg by mouth if needed, Disp: , Rfl:     denosumab (Prolia) 60 mg/mL, as directed Subcutaneous, Disp: , Rfl:     EPINEPHrine (EPIPEN) 0.3 mg/0.3 mL SOAJ, , Disp: , Rfl:     letrozole (FEMARA) 2.5 mg tablet, Take 1 tablet by mouth once daily, Disp: 30 tablet, Rfl: 5    lidocaine-prilocaine (EMLA) cream, Apply topically on the skin over the Port-A-Cath as advised, Disp: 30 g, Rfl: 1    loperamide (IMODIUM) 2 mg capsule, Take 2 mg by mouth 4 (four) times a day as needed for diarrhea, Disp: , Rfl:     sodium chloride (OCEAN) 0.65 % nasal spray, Saline Nasal Mist, Disp: , Rfl:     docusate sodium (COLACE) 100 mg capsule, Take 1 capsule (100 mg total) by mouth 3 (three) times a day as needed for constipation (Patient not taking: Reported on 4/10/2024), Disp: 40 capsule, Rfl: 1    enoxaparin (Lovenox) 40 mg/0.4 mL, Inject 0.4 mL (40 mg total) under the skin in the morning for 7 days Use right or left thigh., Disp: 2.8 mL, Rfl: 0    ondansetron (ZOFRAN-ODT) 4 mg disintegrating tablet, Take 1 tablet (4 mg total) by mouth every 6 (six) hours as  "needed for nausea or vomiting, Disp: 20 tablet, Rfl: 0    oxyCODONE (Roxicodone) 5 immediate release tablet, Take 1 tablet (5 mg total) by mouth every 4 (four) hours as needed for moderate pain for up to 12 doses Max Daily Amount: 30 mg, Disp: 12 tablet, Rfl: 0    senna (SENOKOT) 8.6 mg, Take 2 tablets (17.2 mg total) by mouth daily at bedtime, Disp: 30 tablet, Rfl: 1  Allergies   Allergen Reactions    Molds & Smuts Hives and Anaphylaxis     Sore throat  Sore throat      Other Other (See Comments), Anaphylaxis and Hives     Mold/gets sore throat    Nuts - Food Allergy Hives     BRAZILIAN NUTS           Review of Systems:  Refer to nursing note    OBJECTIVE:   /83 (BP Location: Left arm, Patient Position: Sitting, Cuff Size: Standard)   Pulse 67   Temp (!) 97.1 °F (36.2 °C) (Temporal)   Resp 18   Ht 5' 5\" (1.651 m)   Wt 57.2 kg (126 lb)   SpO2 98%   BMI 20.97 kg/m²     Physical Exam:   General Appearance:  Alert, cooperative, no distress, appears stated age  Breast Exam:  Right Breast: No residual dyspigmentation in the irradiated field. No edema. Benign exam. No palpable abnormalities in the breast, axilla, or supraclavicular region  Left  Breast: Benign exam. No palpable abnormalities in the breast, axilla, or supraclavicular region  Exam chaperoned by female nursing staff.  Lungs: Respirations unlabored, no cyanosis, able to speak in complete sentences without dyspnea.  Skin: No generalized rash or dermatitis  Neurologic: ANOx3, speech and cognition intact.    Portions of the record may have been created with voice recognition software.  Occasional wrong word or \"sound a like\" substitutions may have occurred due to the inherent limitations of voice recognition software.  Read the chart carefully and recognize, using context, where substitutions have occurred.  "

## 2024-07-08 NOTE — PROGRESS NOTES
Emily Hernandez 1960 is a 64 y.o. female  with triple positive invasive ductal carcinoma of the right breast. She is s/p neoadjuvant chemo followed by lumpectomy and sentinel lymph node biopsy. She completed radiation to the right breast 22.  Last seen in radiation oncology on 23 with Dr. Baxter    1/10/24 Dr. Motta  Remains on anastrozole. No concerns on exam.    24  Mammogram  IMPRESSION:   No suspicious findings.  ASSESSMENT/BI-RADS CATEGORY:  Overall: 2 - Benign  RECOMMENDATION:       - Diagnostic mammogram in 1 year for both breasts.    3/13/24  [facility]/Dr. Diamond, Plastic Surgery  Procedure:FAT GRAFTING TO RIGHT BREAST. (Right: Breast)                     LEFT MASTOPEXY BREAST FOR SYMMETRY (Left: Breas t)    24  Lobo, Plastic Surgery  No complaints.  Applying scar cream daily    Upcomin24  Surgical Oncology  24  Plastic Surgery    4/10/24  Dr. Jason  Continues with letrazole.  F/U in 3 months    Follow up visit     Oncology History   Malignant neoplasm of central portion of right breast in female, estrogen receptor positive (HCC)   3/29/2022 Biopsy    Right breast biopsy:  - Invasive ductal carcinoma  Grade 2  ER 70%   MS 70%  HER2 positive     2022 -  Cancer Staged    Staging form: Breast, AJCC 8th Edition  - Clinical stage from 2022: Stage IB (cT2, cN0, cM0, G2, ER+, MS+, HER2+) - Signed by Azalea Motta MD on 2022  Stage prefix: Initial diagnosis  Method of lymph node assessment: Clinical  Histologic grading system: 3 grade system       2022 -  Chemotherapy    Pegfilgrastim-bmez (ZIEXTENZO), 6 mg, Subcutaneous, Once, 6 of 6 cycles  Administration: 6 mg (2022), 6 mg (2022), 6 mg (2022), 6 mg (2022), 6 mg (2022), 6 mg (2022)  fosaprepitant (EMEND) IVPB, 150 mg, Intravenous, Once, 6 of 6 cycles  Administration: 150 mg (2022), 150 mg (2022), 150 mg (6/15/2022), 150 mg (2022), 150 mg (2022), 150 mg  (8/17/2022)  pertuzumab (PERJETA) IVPB, 840 mg, Intravenous, Once, 18 of 18 cycles  Administration: 840 mg (5/4/2022), 420 mg (5/25/2022), 420 mg (9/7/2022), 420 mg (6/15/2022), 420 mg (7/6/2022), 420 mg (7/27/2022), 420 mg (8/17/2022), 420 mg (9/28/2022), 420 mg (10/19/2022), 420 mg (11/9/2022), 420 mg (11/30/2022), 420 mg (12/21/2022), 420 mg (1/11/2023), 420 mg (2/1/2023), 420 mg (2/22/2023), 420 mg (3/15/2023), 420 mg (4/5/2023), 420 mg (4/26/2023)  CARBOplatin (PARAPLATIN) IVPB (GO AUC DOSING), 588.6 mg, Intravenous, Once, 6 of 6 cycles  Administration: 588.6 mg (5/4/2022), 588.6 mg (5/25/2022), 588.6 mg (6/15/2022), 582.6 mg (7/6/2022), 588.6 mg (7/27/2022), 582.6 mg (8/17/2022)  trastuzumab-qyyp (TRAZIMERA) chemo infusion, 6 mg/kg = 330 mg (100 % of original dose 6 mg/kg), Intravenous, Once, 11 of 11 cycles  Dose modification: 6 mg/kg (original dose 6 mg/kg, Cycle 8), 6 mg/kg (original dose 6 mg/kg, Cycle 9), 6 mg/kg (original dose 6 mg/kg, Cycle 12)  Administration: 330 mg (9/28/2022), 330 mg (10/19/2022), 330 mg (11/9/2022), 361 mg (11/30/2022), 361 mg (12/21/2022), 361 mg (1/11/2023), 361 mg (2/1/2023), 361 mg (2/22/2023), 361 mg (3/15/2023), 361 mg (4/5/2023), 361 mg (4/26/2023)  DOCEtaxel (TAXOTERE) chemo infusion, 75 mg/m2 = 120 mg, Intravenous, Once, 6 of 6 cycles  Administration: 120 mg (5/4/2022), 120 mg (5/25/2022), 120 mg (6/15/2022), 120 mg (7/6/2022), 120 mg (7/27/2022), 120 mg (8/17/2022)  trastuzumab (HERCEPTIN) chemo infusion, 439 mg, Intravenous, Once, 7 of 7 cycles  Administration: 450 mg (5/4/2022), 330 mg (5/25/2022), 330 mg (9/7/2022), 330 mg (6/15/2022), 330 mg (7/6/2022), 330 mg (7/27/2022), 330 mg (8/17/2022)     8/2022 Genetic Testing    [facility] BRCAplus STAT Panel (8 genes): ASHU, BRCA1, BRCA2, CDH1, CHEK2, PALB2, PTEN, TP53 with reflex to [facility] CancerNext + RNA (28 additional genes): APC, AXIN2 BARD1, BRIP1, BMPR1A, CDK4, CDKN2A, DICER1, EPCAM, GREM1, HOXB13, MLH1, MSH2, MSH3, MSH6,  MUTYH, NBN, NF1, NTHL1, PMS2, POLD1, POLE, RAD51C, RAD51D, RECQL SMAD4, SMARCA4, STK11    Negative     9/30/2022 Surgery    Right lumpectomy with sentinel lymph node biopsy:  - clear margins  - 0/8 lymph nodes    Dr. Motta     10/18/2022 -  Cancer Staged    Staging form: Breast, AJCC 8th Edition  - Pathologic stage from 10/18/2022: No Stage Recommended (ypT1c, pN0(sn), cM0, G2, ER+, GA+, HER2+) - Signed by Azalea Motta MD on 10/18/2022  Stage prefix: Post-therapy  Method of lymph node assessment: [place] lymph node biopsy  Histologic grading system: 3 grade system       11/11/2022 - 12/9/2022 Radiation    Plan ID Energy Fractions Dose per Fraction (cGy) Dose Correction (cGy) Total Dose Delivered (cGy) Elapsed Days   R Breast 6X 16 / 16 266 0 4,256 24   R Brst Boost 12E 4 / 4 250 0 1,000 3   Dr. Henry     11/2022 -  Hormone Therapy    Letrozole   Dr. Jason         Review of Systems:  Review of Systems   Constitutional: Negative.    HENT: Negative.     Eyes: Negative.    Respiratory: Negative.     Cardiovascular: Negative.    Gastrointestinal: Negative.    Endocrine: Negative.    Genitourinary:  Positive for frequency.   Musculoskeletal: Negative.    Skin: Negative.    Allergic/Immunologic: Positive for environmental allergies and food allergies.   Neurological:  Positive for numbness (carpel tunnel).   Hematological:  Bruises/bleeds easily.   Psychiatric/Behavioral: Negative.         Health Maintenance   Topic Date Due    Hepatitis C Screening  Never done    HIV Screening  Never done    Zoster Vaccine (1 of 2) Never done    Pneumococcal Vaccine: Pediatrics (0 to 5 Years) and At-Risk Patients (6 to 64 Years) (3 of 3 - PPSV23 or PCV20) 10/06/2020    RSV Vaccine Age 60+ Years (1 - 1-dose 60+ series) Never done    ONC Colorectal Surgery Screening  Never done    ONC Cervical Cancer Screening  10/18/2023    ONC DXA Scan  10/18/2023    COVID-19 Vaccine (8 - 2023-24 season) 12/09/2023    Depression Screening  07/06/2024     Influenza Vaccine (1) 09/01/2024    Breast Cancer Screening: Mammogram  02/23/2025    Annual Physical  04/16/2025    BMI: Adult  06/05/2025    Breast Cancer Survivorship Visit  07/11/2025    Cervical Cancer Screening  04/08/2027    DTaP,Tdap,and Td Vaccines (2 - Td or Tdap) 11/16/2028    Colorectal Cancer Screening  08/27/2033    Osteoporosis Screening  Completed    ONC Physical Therapy Referral  Addressed    RSV Vaccine age 0-20 Months  Aged Out    HIB Vaccine  Aged Out    IPV Vaccine  Aged Out    Hepatitis A Vaccine  Aged Out    Meningococcal ACWY Vaccine  Aged Out    HPV Vaccine  Aged Out     Patient Active Problem List   Diagnosis    Malignant neoplasm of central portion of right breast in female, estrogen receptor positive (HCC)    Port-A-Cath in place    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HCC)    Osteoporosis due to aromatase inhibitor    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    Asthma 2018 2018 to Jan 2021, i was diagnosed with asthma, jan 2021, i did nit have asthma according to the breathing test the asthma doctor performed, i get allergiy shot every 2 weeks for 2 different types of molds    Cancer (HCC)     breast    Diarrhea     occasional    Port-A-Cath in place     R side chest    Varicella 1971    I have not had it since then.     Past Surgical History:   Procedure Laterality Date    BREAST BIOPSY Right 03/29/2022    IDC    BREAST LUMPECTOMY Right 09/30/2022    Procedure: ML BREAST;  Surgeon: Azalea Motta MD;  Location: [facility];   Service: Surgical Oncology    CATARACT EXTRACTION Left 2019    with lens implant    IR PORT PLACEMENT  04/20/2022    LYMPH NODE BIOPSY Right 09/30/2022    Procedure: SLN BX;  Surgeon: Azalea Motta MD;  Location: [facility];  Service: Surgical Oncology    MANDIBLE SURGERY      jaw surgery for crooked jaw 1993, 1998    CO GRAFTING OF AUTOLOGOUS FAT BY LIPO 50 CC OR LESS Right 3/13/2024    Procedure: FAT GRAFTING TO RIGHT BREAST.;  Surgeon: Zandra Diamond MD;  [facility];  Service: Plastics    CO MASTOPEXY Left 3/13/2024    Procedure: LEFT MASTOPEXY BREAST FOR SYMMETRY;  Surgeon: Zandra Diamond MD;  [facility];  Service: Plastics    ROTATOR CUFF REPAIR Right 2020    US BREAST CLIP NEEDLE LOC RIGHT Right 03/29/2022    US GUIDED BREAST BIOPSY RIGHT COMPLETE Right 03/29/2022     Family History   Problem Relation Age of Onset    Prostate cancer Father     Asthma Father         He has had asthma previously    No Known Problems Sister     Asthma Brother         Has had asthma previously    No Known Problems Daughter     No Known Problems Daughter     Lung cancer Maternal Aunt     No Known Problems Paternal Aunt     No Known Problems Paternal Aunt     No Known Problems Maternal Grandmother     No Known Problems Maternal Grandfather     Breast cancer Paternal Grandmother 80        I was told she had breast cancer in her 80’s.    No Known Problems Paternal Grandfather     Stomach cancer Other      Social History     Socioeconomic History    Marital status: /Civil Union     Spouse name: Not on file    Number of children: Not on file    Years of education: Not on file    Highest education level: Not on file   Occupational History    Not on file   Tobacco Use    Smoking status: Never     Passive exposure: Never    Smokeless tobacco: Never   Vaping Use    Vaping status: Never Used   Substance and Sexual Activity    Alcohol use: Not Currently    Drug use: Never    Sexual activity: Not  Currently     Partners: Male     Birth control/protection: Condom Male   Other Topics Concern    Not on file   Social History Narrative    Not on file     Social Determinants of Health     Financial Resource Strain: Low Risk  (2/4/2024)    [facility], [facility]    Overall Financial Resource Strain (CARDIA)     Difficulty of Paying Living Expenses: Not very hard   Food Insecurity: No Food Insecurity (2/4/2024)    Received from [facility], [facility]    Hunger Vital Sign     Worried About Running Out of Food in the Last Year: Never true     Ran Out of Food in the Last Year: Never true   Transportation Needs: No Transportation Needs (2/4/2024)    Received from [facility], [facility]    PRAPARE - Transportation     Lack of Transportation (Medical): No     Lack of Transportation (Non-Medical): No   Physical Activity: Not on file   Stress: Stress Concern Present (2/4/2024)    Received from [facility], [facility]    [employer] [place] [employer] - Occupational Stress Questionnaire     Feeling of Stress : To some extent   Social Connections: Feeling Socially Integrated (2/4/2024)    Received from [facility], [facility]    OASIS : Social Isolation     How often do you feel lonely or isolated from those around you?: Rarely   Intimate Partner Violence: Not At Risk (2/4/2024)    Received from [facility], [facility]    Humiliation, Afraid, Rape, and Kick questionnaire     Fear of Current or Ex-Partner: No     Emotionally Abused: No     Physically Abused: No     Sexually Abused: No   Housing Stability: Low Risk  (2/4/2024)    Received from [facility], [facility]    Housing Stability Vital Sign     Unable to Pay for Housing in the Last Year: No      Number of Places Lived in the Last Year: 1     Unstable Housing in the Last Year: No       Current Outpatient Medications:     albuterol (2.5 mg/3 mL) 0.083 % nebulizer solution, USE 1 VIAL IN NEBULIZER EVERY 4 HOURS AS NEEDED FOR WHEEZING, Disp: , Rfl:     albuterol (PROVENTIL HFA,VENTOLIN HFA) 90 mcg/act inhaler, , Disp: , Rfl:     cetirizine (ZyrTEC) 10 mg tablet, Take 10 mg by mouth if needed, Disp: , Rfl:     denosumab (Prolia) 60 mg/mL, as directed Subcutaneous, Disp: , Rfl:     docusate sodium (COLACE) 100 mg capsule, Take 1 capsule (100 mg total) by mouth 3 (three) times a day as needed for constipation (Patient not taking: Reported on 4/10/2024), Disp: 40 capsule, Rfl: 1    enoxaparin (Lovenox) 40 mg/0.4 mL, Inject 0.4 mL (40 mg total) under the skin in the morning for 7 days Use right or left thigh. (Patient not taking: Reported on 4/10/2024), Disp: 2.8 mL, Rfl: 0    EPINEPHrine (EPIPEN) 0.3 mg/0.3 mL SOAJ, , Disp: , Rfl:     letrozole (FEMARA) 2.5 mg tablet, Take 1 tablet by mouth once daily, Disp: 30 tablet, Rfl: 5    lidocaine-prilocaine (EMLA) cream, Apply topically on the skin over the Port-A-Cath as advised, Disp: 30 g, Rfl: 1    loperamide (IMODIUM) 2 mg capsule, Take 2 mg by mouth 4 (four) times a day as needed for diarrhea, Disp: , Rfl:     ondansetron (ZOFRAN-ODT) 4 mg disintegrating tablet, Take 1 tablet (4 mg total) by mouth every 6 (six) hours as needed for nausea or vomiting, Disp: 20 tablet, Rfl: 0    oxyCODONE (Roxicodone) 5 immediate release tablet, Take 1 tablet (5 mg total) by mouth every 4 (four) hours as needed for moderate pain for up to 12 doses Max Daily Amount: 30 mg (Patient not taking: Reported on 4/10/2024), Disp: 12 tablet, Rfl: 0    senna (SENOKOT) 8.6 mg, Take 2 tablets (17.2 mg total) by mouth daily at bedtime, Disp: 30 tablet, Rfl: 1    sodium chloride (OCEAN) 0.65 % nasal spray, Saline Nasal Mist, Disp: , Rfl:   Allergies   Allergen Reactions    Molds & Smuts Hives and  Anaphylaxis     Sore throat  Sore throat      Other Other (See Comments), Anaphylaxis and Hives     Mold/gets sore throat    Nuts - Food Allergy Hives     BRAZILIAN NUTS         There were no vitals filed for this visit.

## 2024-07-08 NOTE — TELEPHONE ENCOUNTER
Received a phone call from patient.  Patient stated that she had labs drawn on 7/3 and is inquiring the results.  Informed patient that labs were WNL.

## 2024-07-11 ENCOUNTER — ONCOLOGY SURVIVORSHIP (OUTPATIENT)
Dept: SURGICAL ONCOLOGY | Facility: CLINIC | Age: 64
End: 2024-07-11
Payer: COMMERCIAL

## 2024-07-11 VITALS
TEMPERATURE: 97.8 F | RESPIRATION RATE: 16 BRPM | BODY MASS INDEX: 20.99 KG/M2 | HEIGHT: 65 IN | SYSTOLIC BLOOD PRESSURE: 118 MMHG | HEART RATE: 82 BPM | OXYGEN SATURATION: 97 % | WEIGHT: 126 LBS | DIASTOLIC BLOOD PRESSURE: 82 MMHG

## 2024-07-11 DIAGNOSIS — Z12.39 ENCOUNTER FOR BREAST CANCER SCREENING OTHER THAN MAMMOGRAM: ICD-10-CM

## 2024-07-11 DIAGNOSIS — C50.111 MALIGNANT NEOPLASM OF CENTRAL PORTION OF RIGHT BREAST IN FEMALE, ESTROGEN RECEPTOR POSITIVE (HCC): Primary | ICD-10-CM

## 2024-07-11 DIAGNOSIS — R92.30 DENSE BREASTS: ICD-10-CM

## 2024-07-11 DIAGNOSIS — Z17.0 MALIGNANT NEOPLASM OF CENTRAL PORTION OF RIGHT BREAST IN FEMALE, ESTROGEN RECEPTOR POSITIVE (HCC): Primary | ICD-10-CM

## 2024-07-11 DIAGNOSIS — Z79.811 USE OF LETROZOLE (FEMARA): ICD-10-CM

## 2024-07-11 PROCEDURE — 99213 OFFICE O/P EST LOW 20 MIN: CPT | Performed by: NURSE PRACTITIONER

## 2024-07-11 NOTE — PROGRESS NOTES
Assessment/Plan:    Diagnoses and all orders for this visit:    Malignant neoplasm of central portion of right breast in female, estrogen receptor positive (HCC)    Use of letrozole (Femara)    Dense breasts  -     US breast screening bilateral complete (ABUS); Future    Encounter for breast cancer screening other than mammogram  -     US breast screening bilateral complete (ABUS); Future      Patient is a 64-year-old female that was diagnosed with a right-sided breast cancer in March 2022.  Her pathology revealed invasive ductal carcinoma, ER/PA 70%, HER2 positive. She underwent genetic testing which was negative. She received neoadjuvant chemotherapy with TCHP.  She underwent a right lumpectomy and sentinel node biopsy with Dr. Motta and completed adjuvant RT.   She completed adjuvant Herceptin and Perjecta therapy.  She is taking letrozole.  She had a bilateral 3D diagnostic mammogram and automated breast ultrasound in February 2024 which were both BI-RADS 2.  She underwent fat grafting to the right breast and a left breast mastopexy in March 2024.  Follow-up breast cancer survivorship visit performed today.  She offers no new complaints today and there are no worrisome findings on today's clinical exam.  She is already scheduled for a mammogram in February.  I will add on the automated breast ultrasound secondary to her breast density.  She is up-to-date on colorectal and cervical cancer screenings.  We will see her back in 6 months or sooner should the need arise.  She was instructed to contact us with any changes or concerns in the interim.  All of her questions were answered today.        REASON FOR VISIT:   Survivorship      Previous therapy:  Oncology History   Malignant neoplasm of central portion of right breast in female, estrogen receptor positive (HCC)   3/29/2022 Biopsy    Right breast biopsy:  - Invasive ductal carcinoma  Grade 2  ER 70%   PA 70%  HER2 positive     4/6/2022 -  Cancer Staged     Staging form: Breast, AJCC 8th Edition  - Clinical stage from 4/6/2022: Stage IB (cT2, cN0, cM0, G2, ER+, W[place]+, HER2+) - Signed by Azalea Motta MD on 4/6/2022  Stage prefix: Initial diagnosis  Method of lymph node assessment: Clinical  Histologic grading system: 3 grade system       5/4/2022 -  Chemotherapy    Pegfilgrastim-bmez (ZIEXTENZO), 6 mg, Subcutaneous, Once, 6 of 6 cycles  Administration: 6 mg (5/5/2022), 6 mg (5/26/2022), 6 mg (6/16/2022), 6 mg (7/7/2022), 6 mg (7/28/2022), 6 mg (8/18/2022)  fosaprepitant (EMEND) IVPB, 150 mg, Intravenous, Once, 6 of 6 cycles  Administration: 150 mg (5/4/2022), 150 mg (5/25/2022), 150 mg (6/15/2022), 150 mg (7/6/2022), 150 mg (7/27/2022), 150 mg (8/17/2022)  pertuzumab (PERJETA) IVPB, 840 mg, Intravenous, Once, 18 of 18 cycles  Administration: 840 mg (5/4/2022), 420 mg (5/25/2022), 420 mg (9/7/2022), 420 mg (6/15/2022), 420 mg (7/6/2022), 420 mg (7/27/2022), 420 mg (8/17/2022), 420 mg (9/28/2022), 420 mg (10/19/2022), 420 mg (11/9/2022), 420 mg (11/30/2022), 420 mg (12/21/2022), 420 mg (1/11/2023), 420 mg (2/1/2023), 420 mg (2/22/2023), 420 mg (3/15/2023), 420 mg (4/5/2023), 420 mg (4/26/2023)  CARBOplatin (PARAPLATIN) IVPB (GOG AUC DOSING), 588.6 mg, Intravenous, Once, 6 of 6 cycles  Administration: 588.6 mg (5/4/2022), 588.6 mg (5/25/2022), 588.6 mg (6/15/2022), 582.6 mg (7/6/2022), 588.6 mg (7/27/2022), 582.6 mg (8/17/2022)  trastuzumab-qyyp (TRAZIMERA) chemo infusion, 6 mg/kg = 330 mg (100 % of original dose 6 mg/kg), Intravenous, Once, 11 of 11 cycles  Dose modification: 6 mg/kg (original dose 6 mg/kg, Cycle 8), 6 mg/kg (original dose 6 mg/kg, Cycle 9), 6 mg/kg (original dose 6 mg/kg, Cycle 12)  Administration: 330 mg (9/28/2022), 330 mg (10/19/2022), 330 mg (11/9/2022), 361 mg (11/30/2022), 361 mg (12/21/2022), 361 mg (1/11/2023), 361 mg (2/1/2023), 361 mg (2/22/2023), 361 mg (3/15/2023), 361 mg (4/5/2023), 361 mg (4/26/2023)  DOCEtaxel (TAXOTERE) chemo infusion, 75  mg/m2 = 120 mg, Intravenous, Once, 6 of 6 cycles  Administration: 120 mg (5/4/2022), 120 mg (5/25/2022), 120 mg (6/15/2022), 120 mg (7/6/2022), 120 mg (7/27/2022), 120 mg (8/17/2022)  trastuzumab (HERCEPTIN) chemo infusion, 439 mg, Intravenous, Once, 7 of 7 cycles  Administration: 450 mg (5/4/2022), 330 mg (5/25/2022), 330 mg (9/7/2022), 330 mg (6/15/2022), 330 mg (7/6/2022), 330 mg (7/27/2022), 330 mg (8/17/2022)     8/2022 Genetic Testing    [facility] BRCAplus STAT Panel (8 genes): ASHU, BRCA1, BRCA2, CDH1, CHEK2, PALB2, PTEN, TP53 with reflex to [facility] CancerNext + RNA (28 additional genes): APC, AXIN2 BARD1, BRIP1, BMPR1A, CDK4, CDKN2A, DICER1, EPCAM, GREM1, HOXB13, MLH1, MSH2, MSH3, MSH6, MUTYH, NBN, NF1, NTHL1, PMS2, POLD1, POLE, RAD51C, RAD51D, RECQL SMAD4, SMARCA4, STK11    Negative     9/30/2022 Surgery    Right lumpectomy with sentinel lymph node biopsy:  - clear margins  - 0/8 lymph nodes    Dr. Motta     10/18/2022 -  Cancer Staged    Staging form: Breast, AJCC 8th Edition  - Pathologic stage from 10/18/2022: No Stage Recommended (ypT1c, pN0(sn), cM0, G2, ER+, MA+, HER2+) - Signed by Azalea Motta MD on 10/18/2022  Stage prefix: Post-therapy  Method of lymph node assessment: [place] lymph node biopsy  Histologic grading system: 3 grade system       11/11/2022 - 12/9/2022 Radiation    Plan ID Energy Fractions Dose per Fraction (cGy) Dose Correction (cGy) Total Dose Delivered (cGy) Elapsed Days   R Breast 6X 16 / 16 266 0 4,256 24   R Brst Boost 12E 4 / 4 250 0 1,000 3   Dr. Henry     11/2022 -  Hormone Therapy    Letrozole   Dr. Jason           Patient ID: Emily Hernandez is a 64 y.o. female  Presenting today for follow-up breast cancer survivorship visit.  She has not appreciated any changes on self breast exam.  She denies persistent headaches, back pain or bone pain, cough or shortness of breath, abdominal pain.  She had fat grafting to the right breast and a left breast mastopexy and is happy with the  "results.  Continues on letrozole.          Review of Systems   Constitutional:  Negative for activity change, appetite change, chills, fatigue, fever and unexpected weight change.   Respiratory:  Negative for cough and shortness of breath.    Cardiovascular:  Negative for chest pain.   Gastrointestinal:  Negative for abdominal pain, constipation, diarrhea, nausea and vomiting.   Musculoskeletal:  Negative for arthralgias, back pain, gait problem and myalgias.   Skin:  Negative for color change and rash.   Neurological:  Negative for dizziness and headaches.   Hematological:  Negative for adenopathy.   Psychiatric/Behavioral:  Negative for agitation and confusion.    All other systems reviewed and are negative.      Objective:    Blood pressure 118/82, pulse 82, temperature 97.8 °F (36.6 °C), temperature source Temporal, resp. rate 16, height 5' 5\" (1.651 m), weight 57.2 kg (126 lb), SpO2 97%.  Body mass index is 20.97 kg/m².      Physical Exam  Vitals reviewed.   Constitutional:       General: She is not in acute distress.     Appearance: Normal appearance. She is well-developed. She is not diaphoretic.   HENT:      Head: Normocephalic and atraumatic.   Cardiovascular:      Rate and Rhythm: Normal rate and regular rhythm.      Heart sounds: Normal heart sounds.   Pulmonary:      Effort: Pulmonary effort is normal.      Breath sounds: Normal breath sounds.   Chest:   Breasts:     Right: Skin change (surigcal scars) present. No swelling, bleeding, inverted nipple, mass, nipple discharge or tenderness.      Left: Skin change (mastopexy scars) present. No swelling, bleeding, inverted nipple, mass, nipple discharge or tenderness.      Comments: Rcw port  Abdominal:      Palpations: Abdomen is soft. There is no mass.      Tenderness: There is no abdominal tenderness.   Musculoskeletal:         General: Normal range of motion.      Cervical back: Normal range of motion.   Lymphadenopathy:      Upper Body:      Right upper " body: No supraclavicular or axillary adenopathy.      Left upper body: No supraclavicular or axillary adenopathy.   Skin:     General: Skin is warm and dry.      Findings: No rash.   Neurological:      Mental Status: She is alert and oriented to person, place, and time.   Psychiatric:         Speech: Speech normal.         Discussed symptoms related to disease recurrence, Yes    Evaluated for late effects related to cancer treatment, Yes    Screening current for cervical cancer, Yes, describe: up to date    Screening current for colon cancer, Yes, describe: up to date    Cancer rehabilitation services addressed, No    Screening current for osteoporosis, Yes, dxa 12/22

## 2024-07-24 ENCOUNTER — OFFICE VISIT (OUTPATIENT)
Dept: OBGYN CLINIC | Facility: HOSPITAL | Age: 64
End: 2024-07-24
Payer: COMMERCIAL

## 2024-07-24 VITALS
HEART RATE: 77 BPM | DIASTOLIC BLOOD PRESSURE: 78 MMHG | HEIGHT: 65 IN | BODY MASS INDEX: 21.31 KG/M2 | SYSTOLIC BLOOD PRESSURE: 121 MMHG | WEIGHT: 127.9 LBS

## 2024-07-24 DIAGNOSIS — M18.11 PRIMARY OSTEOARTHRITIS OF FIRST CARPOMETACARPAL JOINT OF RIGHT HAND: ICD-10-CM

## 2024-07-24 DIAGNOSIS — G56.01 CARPAL TUNNEL SYNDROME ON RIGHT: ICD-10-CM

## 2024-07-24 DIAGNOSIS — M65.311 TRIGGER THUMB OF RIGHT HAND: Primary | ICD-10-CM

## 2024-07-24 PROCEDURE — 99213 OFFICE O/P EST LOW 20 MIN: CPT | Performed by: ORTHOPAEDIC SURGERY

## 2024-07-24 NOTE — PROGRESS NOTES
ASSESSMENT/PLAN:    Assessment:   Carpal Tunnel Syndrome right - minimal irritation  Right thumb trigger finger - mostly resolved  Right thumb CMC arthritis - mostly resolved    Plan:   Discussed treatment options including continued observation, continued night time carpal tunnel bracing, anti-inflammatories, hand therapy, cortisone injection, versus surgical intervention.  Patient elected for conservative treatment at this time due to no pain and minimal symptoms.  Patient will follow-up as needed for treatment if symptoms return.      Follow Up:  PRN    To Do Next Visit:  Re-evaluation right thumb CMC arthritis, right carpal tunnel, right thumb trigger finger    General Discussions:  CMC Arthritis: The anatomy and physiology of carpometacarpal joint arthritis was discussed with the patient today in the office.  Deterioration of the articular cartilage eventually leads to hypermobility at the thumb CMC joint, resulting in joint subluxation, osteophyte formation, cystic changes within the trapezium and base of the first metacarpal, as well as subchondral sclerosis.  Eventually, pain, limited mobility, and compensatory hyperextension at the metacarpophalangeal joint may develop.  While normal activity and usage of the thumb joint may provide a painful experience to the patient, this typically does not result in damage to the thumb or hand.  Treatment options include resting thumb spica splints to decreased joint edema, pain, and inflammation.  Therapy exercises to strengthen the thenar musculature may relieve pain, but do not alter the overall continued development of osteoarthritis.  Oral medications, topical medications, corticosteroid injections may decrease pain and increase overall function.  Eventually, approximately 5% of patients may require surgical intervention.     Carpal Tunnel Syndrome: The anatomy and physiology of carpal tunnel syndrome was discussed with the patient today.  Increase pressure  localized under the transverse carpal ligament can cause pain, numbness, tingling, or dysesthesias within the median nerve distribution as well as feelings of fatigue, clumsiness, or awkwardness.  These symptoms typically occur at night and worse in the morning upon waking.  Eventually, untreated carpal tunnel syndrome can result in weakness and permanent loss of muscle within the thenar compartment of the hand.  Treatment options were discussed with the patient.  Conservative treatment includes nocturnal resting splints to keep the nerve in a neutral position, ergonomic changes within the work or home environment, activity modification, and tendon gliding exercises.  Steroid injections within the carpal canal can help a majority of patients, however this is often self-limited in a majority of patients.  Surgical intervention to divide the transverse carpal ligament typically results in a long-lasting relief of the patient's complaints, with the recurrence rate of less than 1%.   Trigger FInger: The anatomy and physiology of trigger finger was discussed with the patient today in the office.  Edema and increased contact pressure within the flexor tendons at the A1 pulley can cause pain, crepitation, and limitation of function.  Treatment options include resting MP blocking splints to decrease edema, oral anti-inflammatory medications, home or formal therapy exercises, up to 2 steroid injections within the tendon sheath, or surgical release.  While majority of patients do respond to conservative treatment, up to 20% may require surgical release.     Operative Discussions:      _____________________________________________________  CHIEF COMPLAINT:  Chief Complaint   Patient presents with    Right Wrist - Carpal Tunnel    Right Thumb - Triggering     TF Injection - #2 6/5  CMC - No Injection          SUBJECTIVE:  Emily Hernandez is a 64 y.o. female who presents for follow-up regarding right thumb CMC osteoarthritis,  right thumb trigger finger, and right carpal tunnel. She was initially seen by Lizandro Duran on 4/26/24 where she received the CS injection for a right trigger thumb. She received her second right thumb trigger finger CSI on 6/5/24. She reports that since the injection, she had continued clicking for approximately 2 weeks which has almost completely resolved. She states that the carpal tunnel symptoms are very minimal being at most 5 minutes per day when she first awakens. She does report that she notices that she sleeps on her hands. She no longer has pain with thumb movements.  Radiation: None  Previous Treatments: first right thumb trigger finger injection by Lizandro Duran on 4/26/24, cock up wrist brace right wrist for sleeping both with improvement, second right thumb trigger finger on 6/4/24.    Handedness: right  Work status: Unknown    PAST MEDICAL HISTORY:  Past Medical History:   Diagnosis Date    Abnormal Pap smear of cervix 2010    Followed up following year with normal pap smear    Anemia March 28,2022    Due to infusion therapy?    Asthma 2018 2018 to Jan 2021, i was diagnosed with asthma, jan 2021, i did nit have asthma according to the breathing test the asthma doctor performed, i get allergiy shot every 2 weeks for 2 different types of molds    Cancer (HCC)     breast    Diarrhea     occasional    Port-A-Cath in place     R side chest    Varicella 1971    I have not had it since then.       PAST SURGICAL HISTORY:  Past Surgical History:   Procedure Laterality Date    BREAST BIOPSY Right 03/29/2022    [facility]    BREAST LUMPECTOMY Right 09/30/2022    Procedure: ML BREAST;  Surgeon: Azalea Motta MD;  Location: [facility];  Service: Surgical Oncology    CATARACT EXTRACTION Left 2019    with lens implant    IR PORT PLACEMENT  04/20/2022    LYMPH NODE BIOPSY Right 09/30/2022    Procedure: SLN BX;  Surgeon: Azalea Motta MD;  Location: [facility];  Service: Surgical Oncology    MANDIBLE SURGERY      jaw  surgery for crooked jaw 1993, 1998    NH GRAFTING OF AUTOLOGOUS FAT BY LIPO 50 CC OR LESS Right 3/13/2024    Procedure: FAT GRAFTING TO RIGHT BREAST.;  Surgeon: Zandra Diamond MD;  [facility]: [facility];  Service: Plastics    NH MASTOPEXY Left 3/13/2024    Procedure: LEFT MASTOPEXY BREAST FOR SYMMETRY;  Surgeon: Zandra Diamond MD;  [facility];  Service: Plastics    ROTATOR CUFF REPAIR Right 2020    US BREAST CLIP NEEDLE LOC RIGHT Right 03/29/2022    US GUIDED BREAST BIOPSY RIGHT COMPLETE Right 03/29/2022       FAMILY HISTORY:  Family History   Problem Relation Age of Onset    Prostate cancer Father     Asthma Father         He has had asthma previously    No Known Problems Sister     Asthma Brother         Has had asthma previously    No Known Problems Daughter     No Known Problems Daughter     Lung cancer Maternal Aunt     No Known Problems Paternal Aunt     No Known Problems Paternal Aunt     No Known Problems Maternal Grandmother     No Known Problems Maternal Grandfather     Breast cancer Paternal Grandmother 80        I was told she had breast cancer in her 80’s.    No Known Problems Paternal Grandfather     Stomach cancer Other        SOCIAL HISTORY:  Social History     Tobacco Use    Smoking status: Never     Passive exposure: Never    Smokeless tobacco: Never   Vaping Use    Vaping status: Never Used   Substance Use Topics    Alcohol use: Not Currently    Drug use: Never       MEDICATIONS:    Current Outpatient Medications:     albuterol (2.5 mg/3 mL) 0.083 % nebulizer solution, USE 1 VIAL IN NEBULIZER EVERY 4 HOURS AS NEEDED FOR WHEEZING, Disp: , Rfl:     albuterol (PROVENTIL HFA,VENTOLIN HFA) 90 mcg/act inhaler, , Disp: , Rfl:     cetirizine (ZyrTEC) 10 mg tablet, Take 10 mg by mouth if needed, Disp: , Rfl:     denosumab (Prolia) 60 mg/mL, as directed Subcutaneous, Disp: , Rfl:     EPINEPHrine (EPIPEN) 0.3 mg/0.3 mL SOAJ, , Disp: , Rfl:     letrozole (FEMARA) 2.5 mg tablet, Take 1  "tablet by mouth once daily, Disp: 30 tablet, Rfl: 5    lidocaine-prilocaine (EMLA) cream, Apply topically on the skin over the Port-A-Cath as advised, Disp: 30 g, Rfl: 1    loperamide (IMODIUM) 2 mg capsule, Take 2 mg by mouth 4 (four) times a day as needed for diarrhea, Disp: , Rfl:     sodium chloride (OCEAN) 0.65 % nasal spray, Saline Nasal Mist, Disp: , Rfl:     docusate sodium (COLACE) 100 mg capsule, Take 1 capsule (100 mg total) by mouth 3 (three) times a day as needed for constipation (Patient not taking: Reported on 7/24/2024), Disp: 40 capsule, Rfl: 1    enoxaparin (Lovenox) 40 mg/0.4 mL, Inject 0.4 mL (40 mg total) under the skin in the morning for 7 days Use right or left thigh. (Patient not taking: Reported on 7/11/2024), Disp: 2.8 mL, Rfl: 0    ondansetron (ZOFRAN-ODT) 4 mg disintegrating tablet, Take 1 tablet (4 mg total) by mouth every 6 (six) hours as needed for nausea or vomiting (Patient not taking: Reported on 7/24/2024), Disp: 20 tablet, Rfl: 0    oxyCODONE (Roxicodone) 5 immediate release tablet, Take 1 tablet (5 mg total) by mouth every 4 (four) hours as needed for moderate pain for up to 12 doses Max Daily Amount: 30 mg (Patient not taking: Reported on 7/24/2024), Disp: 12 tablet, Rfl: 0    senna (SENOKOT) 8.6 mg, Take 2 tablets (17.2 mg total) by mouth daily at bedtime (Patient not taking: Reported on 7/11/2024), Disp: 30 tablet, Rfl: 1    ALLERGIES:  Allergies   Allergen Reactions    Molds & Smuts Hives and Anaphylaxis     Sore throat  Sore throat      Other Other (See Comments), Anaphylaxis and Hives     Mold/gets sore throat    Nuts - Food Allergy Hives     BRAZILIAN NUTS           REVIEW OF SYSTEMS:  Pertinent items are noted in HPI.  A comprehensive review of systems was negative.    LABS:  HgA1c: No results found for: \"HGBA1C\"  BMP:   Lab Results   Component Value Date    CALCIUM 9.9 07/03/2024    K 4.4 07/03/2024    CO2 28 07/03/2024     07/03/2024    BUN 15 07/03/2024    " "CREATININE 0.72 07/03/2024         _____________________________________________________  PHYSICAL EXAMINATION:  Vital signs: /78   Pulse 77   Ht 5' 5\" (1.651 m)   Wt 58 kg (127 lb 14.4 oz)   BMI 21.28 kg/m²   General: well developed and well nourished, alert, oriented times 3, and appears comfortable  Psychiatric: Normal  HEENT: Trachea Midline, No torticollis  Cardiovascular: No discernable arrhythmia  Pulmonary: No wheezing or stridor  Abdomen: No rebound or guarding  Extremities: No peripheral edema  Skin: No masses, erythema, lacerations, fluctation, ulcerations  Neurovascular: Sensation Intact to the Median, Ulnar, Radial Nerve, Motor Intact to the Median, Ulnar, Radial Nerve, and Pulses Intact    MUSCULOSKELETAL EXAMINATION:  right wrist -  Patient presents with no obvious anatomical deformity  Skin is warm and dry to touch with no signs of erythema, ecchymosis, infection  ROM WNL  No TTP on exam  MMT: 5/5 throughout  No soft tissue swelling or effusion noted  Full FDS, FDP, extensor mechanisms are intact  - Thenar atrophy, - intrinsic atrophy  - Tinel's at carpal tunnel  - Phalen's sign  - Carpal Tunnel Compression  - AP Drawer  - LM Drawer  - Finklestein  - Ulnar / Radial impaction   - Stark's  Demonstrates WNL wrist, elbow, and shoulder motion  Forearm compartments are soft and supple  2+ Distal radial pulse with brisk capillary refill to the fingers  Radial, median, ulnar motor and sensory distribution intact  Sensation to light touch intact distally    Right Thumb:  Mild clicking in office today of right thumb - no triggering  Nodule present at A1 pulley of right thumb  Positive Shoulder sign  Resolved TTP over right thumb CMC  - grind  - shuck  - circumduction  _____________________________________________________  STUDIES REVIEWED:  No new imaging at time of visit    US of right wrist from 5/17/24 demonstrate findings suspicious for carpal tunnel syndrome. Bifid median nerve.      PROCEDURES " PERFORMED:  Procedures    Scribe Attestation      I,:  Cata Bustos am acting as a scribe while in the presence of the attending physician.:       I,:  Arnie Lee MD personally performed the services described in this documentation    as scribed in my presence.:

## 2024-07-25 ENCOUNTER — HOSPITAL ENCOUNTER (OUTPATIENT)
Dept: NON INVASIVE DIAGNOSTICS | Facility: CLINIC | Age: 64
Discharge: HOME | End: 2024-07-25
Payer: COMMERCIAL

## 2024-07-25 VITALS
HEART RATE: 77 BPM | SYSTOLIC BLOOD PRESSURE: 121 MMHG | DIASTOLIC BLOOD PRESSURE: 78 MMHG | WEIGHT: 127 LBS | HEIGHT: 65 IN | BODY MASS INDEX: 21.16 KG/M2

## 2024-07-25 DIAGNOSIS — T45.1X5A CHEMOTHERAPY INDUCED CARDIOMYOPATHY (HCC): ICD-10-CM

## 2024-07-25 DIAGNOSIS — C50.911 HER2-POSITIVE CARCINOMA OF RIGHT BREAST (HCC): ICD-10-CM

## 2024-07-25 DIAGNOSIS — C50.111 MALIGNANT NEOPLASM OF CENTRAL PORTION OF RIGHT BREAST IN FEMALE, ESTROGEN RECEPTOR POSITIVE (HCC): ICD-10-CM

## 2024-07-25 DIAGNOSIS — C77.3 MALIGNANT NEOPLASM METASTATIC TO LYMPH NODE OF AXILLA (HCC): ICD-10-CM

## 2024-07-25 DIAGNOSIS — I42.7 CHEMOTHERAPY INDUCED CARDIOMYOPATHY (HCC): ICD-10-CM

## 2024-07-25 DIAGNOSIS — Z17.0 MALIGNANT NEOPLASM OF CENTRAL PORTION OF RIGHT BREAST IN FEMALE, ESTROGEN RECEPTOR POSITIVE (HCC): ICD-10-CM

## 2024-07-25 LAB
AORTIC ROOT: 3 CM
APICAL FOUR CHAMBER EJECTION FRACTION: 55 %
ASCENDING AORTA: 2.9 CM
BSA FOR ECHO PROCEDURE: 1.63 M2
E WAVE DECELERATION TIME: 208 MS
E/A RATIO: 0.77
FRACTIONAL SHORTENING: 38 (ref 28–44)
GLOBAL LONGITUIDAL STRAIN: -18 %
INTERVENTRICULAR SEPTUM IN DIASTOLE (PARASTERNAL SHORT AXIS VIEW): 0.9 CM
INTERVENTRICULAR SEPTUM: 0.9 CM (ref 0.6–1.1)
LAAS-AP2: 11.8 CM2
LAAS-AP4: 12.3 CM2
LEFT ATRIUM SIZE: 2.7 CM
LEFT ATRIUM VOLUME (MOD BIPLANE): 27 ML
LEFT ATRIUM VOLUME INDEX (MOD BIPLANE): 16.5 ML/M2
LEFT INTERNAL DIMENSION IN SYSTOLE: 2.3 CM (ref 2.1–4)
LEFT VENTRICULAR INTERNAL DIMENSION IN DIASTOLE: 3.7 CM (ref 3.5–6)
LEFT VENTRICULAR POSTERIOR WALL IN END DIASTOLE: 0.9 CM
LEFT VENTRICULAR STROKE VOLUME: 40 ML
LVSV (TEICH): 40 ML
MV E'TISSUE VEL-LAT: 12 CM/S
MV E'TISSUE VEL-SEP: 10 CM/S
MV PEAK A VEL: 0.82 M/S
MV PEAK E VEL: 63 CM/S
MV STENOSIS PRESSURE HALF TIME: 60 MS
MV VALVE AREA P 1/2 METHOD: 3.67
RA PRESSURE ESTIMATED: 5 MMHG
RIGHT ATRIUM AREA SYSTOLE A4C: 16.2 CM2
RIGHT VENTRICLE ID DIMENSION: 3.6 CM
RV PSP: 25 MMHG
SL CV LEFT ATRIUM LENGTH A2C: 4.2 CM
SL CV LV EF: 60
SL CV PED ECHO LEFT VENTRICLE DIASTOLIC VOLUME (MOD BIPLANE) 2D: 59 ML
SL CV PED ECHO LEFT VENTRICLE SYSTOLIC VOLUME (MOD BIPLANE) 2D: 19 ML
TR MAX PG: 20 MMHG
TR PEAK VELOCITY: 2.2 M/S
TRICUSPID ANNULAR PLANE SYSTOLIC EXCURSION: 2.5 CM
TRICUSPID VALVE PEAK REGURGITATION VELOCITY: 2.22 M/S

## 2024-07-25 PROCEDURE — 93356 MYOCRD STRAIN IMG SPCKL TRCK: CPT

## 2024-07-25 PROCEDURE — 93306 TTE W/DOPPLER COMPLETE: CPT | Performed by: INTERNAL MEDICINE

## 2024-07-25 PROCEDURE — 93356 MYOCRD STRAIN IMG SPCKL TRCK: CPT | Performed by: INTERNAL MEDICINE

## 2024-07-25 PROCEDURE — 93306 TTE W/DOPPLER COMPLETE: CPT

## 2024-07-30 ENCOUNTER — HOSPITAL ENCOUNTER (OUTPATIENT)
Dept: INFUSION CENTER | Facility: CLINIC | Age: 64
Discharge: HOME | End: 2024-07-30
Payer: COMMERCIAL

## 2024-07-30 VITALS
TEMPERATURE: 97.9 F | HEART RATE: 80 BPM | RESPIRATION RATE: 18 BRPM | DIASTOLIC BLOOD PRESSURE: 70 MMHG | SYSTOLIC BLOOD PRESSURE: 105 MMHG

## 2024-07-30 DIAGNOSIS — M81.8 OSTEOPOROSIS DUE TO AROMATASE INHIBITOR: Primary | ICD-10-CM

## 2024-07-30 DIAGNOSIS — T38.6X5A OSTEOPOROSIS DUE TO AROMATASE INHIBITOR: Primary | ICD-10-CM

## 2024-07-30 PROCEDURE — 96372 THER/PROPH/DIAG INJ SC/IM: CPT

## 2024-07-30 RX ADMIN — DENOSUMAB 60 MG: 60 INJECTION SUBCUTANEOUS at 12:12

## 2024-07-30 NOTE — PROGRESS NOTES
Pt presents for prolia. CrCl 62.1, Ca 9.9, pt denies any recent or upcoming dental work. Pt tolerated injection to left arm without incident. Pt declined AVS, aware of next appt 8/14 for port flush and next prolia 1/30 at 12pm.

## 2024-07-31 ENCOUNTER — OFFICE VISIT (OUTPATIENT)
Dept: HEMATOLOGY ONCOLOGY | Facility: CLINIC | Age: 64
End: 2024-07-31
Payer: COMMERCIAL

## 2024-07-31 ENCOUNTER — TELEPHONE (OUTPATIENT)
Age: 64
End: 2024-07-31

## 2024-07-31 VITALS
SYSTOLIC BLOOD PRESSURE: 100 MMHG | HEART RATE: 79 BPM | HEIGHT: 65 IN | BODY MASS INDEX: 21.33 KG/M2 | TEMPERATURE: 96.5 F | DIASTOLIC BLOOD PRESSURE: 62 MMHG | WEIGHT: 128 LBS | OXYGEN SATURATION: 96 % | RESPIRATION RATE: 18 BRPM

## 2024-07-31 DIAGNOSIS — M81.8 OSTEOPOROSIS DUE TO AROMATASE INHIBITOR: ICD-10-CM

## 2024-07-31 DIAGNOSIS — Z95.828 PORT-A-CATH IN PLACE: ICD-10-CM

## 2024-07-31 DIAGNOSIS — I42.7 CHEMOTHERAPY INDUCED CARDIOMYOPATHY (HCC): ICD-10-CM

## 2024-07-31 DIAGNOSIS — C50.111 MALIGNANT NEOPLASM OF CENTRAL PORTION OF RIGHT BREAST IN FEMALE, ESTROGEN RECEPTOR POSITIVE (HCC): ICD-10-CM

## 2024-07-31 DIAGNOSIS — T45.1X5A CHEMOTHERAPY INDUCED CARDIOMYOPATHY (HCC): ICD-10-CM

## 2024-07-31 DIAGNOSIS — T38.6X5A OSTEOPOROSIS DUE TO AROMATASE INHIBITOR: ICD-10-CM

## 2024-07-31 DIAGNOSIS — C77.3 MALIGNANT NEOPLASM METASTATIC TO LYMPH NODE OF AXILLA (HCC): Primary | ICD-10-CM

## 2024-07-31 DIAGNOSIS — Z17.0 MALIGNANT NEOPLASM OF CENTRAL PORTION OF RIGHT BREAST IN FEMALE, ESTROGEN RECEPTOR POSITIVE (HCC): ICD-10-CM

## 2024-07-31 DIAGNOSIS — C50.911 HER2-POSITIVE CARCINOMA OF RIGHT BREAST (HCC): ICD-10-CM

## 2024-07-31 PROBLEM — Z17.31 HER2-POSITIVE CARCINOMA OF RIGHT BREAST (HCC): Status: ACTIVE | Noted: 2024-07-31

## 2024-07-31 PROCEDURE — 99214 OFFICE O/P EST MOD 30 MIN: CPT | Performed by: INTERNAL MEDICINE

## 2024-07-31 RX ORDER — LETROZOLE 2.5 MG/1
2.5 TABLET, FILM COATED ORAL DAILY
Qty: 30 TABLET | Refills: 5 | Status: SHIPPED | OUTPATIENT
Start: 2024-07-31

## 2024-07-31 RX ORDER — LIDOCAINE AND PRILOCAINE 25; 25 MG/G; MG/G
CREAM TOPICAL
Qty: 30 G | Refills: 1 | Status: SHIPPED | OUTPATIENT
Start: 2024-07-31

## 2024-07-31 NOTE — PROGRESS NOTES
HPI: Patient is here with her .  This is continuation of care for triple positive right breast cancer, T2 NX diagnosed in March 2022 . Tumor mass was 2.5 cm  with negative axilla on ultrasound and negative distant metastatic disease.  Path  report was invasive mammary carcinoma, grade 2, ER 70-80% and SD 70-80% and HER2 3+ positive.  Patient received neoadjuvant chemo immunotherapy and she received 6 cycles of TCHP and after that she had lumpectomy and sentinel lymph node sampling  on 09/30/2022 and there was residual 1.8 cm invasive cancer.  Margins were clear. All  8 sentinel lymph nodes were negative for metastatic disease.  Patient received radiation.  Patient had Herceptin plus Perjeta to complete 1 year of therapy and that was completed in April 2023.  Letrozole was started in November 2022 and she will take that for a total of 10 years.  She has osteopenia and she is taking calcium and vitamin D and is  on Prolia. Negative genetic testing for significant variant.  No history of cancers in her family.   Patient is less nervous and anxious now .  Has less tiredness.  She had CT scan and bone scan recently because occasionally she would have has right lower chest anteriorly.  Imaging studies did not show metastatic disease.  She states this pain has been there ever since she had surgery.    ROS:   Reviewed 12 systems: See symptoms in HPI  Presently no other neurological, cardiac, pulmonary, GI and  symptoms other than listed in HPI.  Other symptoms are in HPI.  No symptoms like fever, chills, bleeding, skin rash, weight loss, night sweats, arthritic symptoms,   weakness, numbness, claudication and gait problem. No frequent infections.  Not unusually sensitive to heat or cold. No swelling of the ankles. No swollen glands.  Patient is less anxious.      Physical Exam:       7/31/24 7/30/24 7/25/24   Blood Pressure 100/62 105/70 121/78   Pulse 79 80 77   Respirations 18 18 --   Temperature 96.5 °F (35.8  "°C) (Abnormal)   97.9 °F (36.6 °C) --   Temp Source -- Temporal --   SpO2 96 % -- --   Weight - Scale 58.1 kg (128 lb) -- 57.6 kg (127 lb)   Height 5' 5\" (1.651 m) -- 5' 5\" (1.651 m)   Pain Score Zero -- --     Alert and oriented and not in distress.  Vital signs are above.  No icterus.  No oral thrush.  No palpable neck mass.  Clear lung fields.  Regular heart rate.  Soft and nontender abdomen.  No palpable abdominal mass.  No ascites.  No edema of ankles.  No calf tenderness.  No focal neurological deficit.  No skin rash.  Lymph nodes are not palpably enlarged in the neck and axillary areas.  No clubbing.   Patient is anxious.  Performance status 1.   No lymphedema.    Historical Information   Past Medical History:   Diagnosis Date   • Abnormal Pap smear of cervix 2010    Followed up following year with normal pap smear   • Anemia March 28,2022    Due to infusion therapy?   • Asthma 2018 2018 to Jan 2021, i was diagnosed with asthma, jan 2021, i did nit have asthma according to the breathing test the asthma doctor performed, i get allergiy shot every 2 weeks for 2 different types of molds   • Cancer (HCC)     breast   • Diarrhea     occasional   • Port-A-Cath in place     R side chest   • Varicella 1971    I have not had it since then.     Past Surgical History:   Procedure Laterality Date   • BREAST BIOPSY Right 03/29/2022    [facility]   • BREAST LUMPECTOMY Right 09/30/2022    Procedure: ML BREAST;  Surgeon: Azalea Motta MD;  Location: [facility];  Service: Surgical Oncology   • CATARACT EXTRACTION Left 2019    with lens implant   • IR PORT PLACEMENT  04/20/2022   • LYMPH NODE BIOPSY Right 09/30/2022    Procedure: SLN BX;  Surgeon: Azalea Motta MD;  Location: [facility];  Service: Surgical Oncology   • MANDIBLE SURGERY      jaw surgery for crooked jaw 1993, 1998   • WI GRAFTING OF AUTOLOGOUS FAT BY LIPO 50 CC OR LESS Right 3/13/2024    Procedure: FAT GRAFTING TO RIGHT BREAST.;  Surgeon: Zandra Diamond MD;  " Location: [facility];  Service: Plastics   • OR MASTOPEXY Left 3/13/2024    Procedure: LEFT MASTOPEXY BREAST FOR SYMMETRY;  Surgeon: Zandra Diamond MD;  [facility];  Service: Plastics   • ROTATOR CUFF REPAIR Right 2020   • US BREAST CLIP NEEDLE LOC RIGHT Right 03/29/2022   • US GUIDED BREAST BIOPSY RIGHT COMPLETE Right 03/29/2022     Social History   Social History     Substance and Sexual Activity   Alcohol Use Not Currently     Social History     Substance and Sexual Activity   Drug Use Never     Social History     Tobacco Use   Smoking Status Never   • Passive exposure: Never   Smokeless Tobacco Never     Family History:   Family History   Problem Relation Age of Onset   • Prostate cancer Father    • Asthma Father         He has had asthma previously   • No Known Problems Sister    • Asthma Brother         Has had asthma previously   • No Known Problems Daughter    • No Known Problems Daughter    • Lung cancer Maternal Aunt    • No Known Problems Paternal Aunt    • No Known Problems Paternal Aunt    • No Known Problems Maternal Grandmother    • No Known Problems Maternal Grandfather    • Breast cancer Paternal Grandmother 80        I was told she had breast cancer in her 80’s.   • No Known Problems Paternal Grandfather    • Stomach cancer Other          Current Outpatient Medications:   •  albuterol (2.5 mg/3 mL) 0.083 % nebulizer solution, USE 1 VIAL IN NEBULIZER EVERY 4 HOURS AS NEEDED FOR WHEEZING, Disp: , Rfl:   •  albuterol (PROVENTIL HFA,VENTOLIN HFA) 90 mcg/act inhaler, , Disp: , Rfl:   •  cetirizine (ZyrTEC) 10 mg tablet, Take 10 mg by mouth if needed, Disp: , Rfl:   •  denosumab (Prolia) 60 mg/mL, as directed Subcutaneous, Disp: , Rfl:   •  EPINEPHrine (EPIPEN) 0.3 mg/0.3 mL SOAJ, , Disp: , Rfl:   •  letrozole (FEMARA) 2.5 mg tablet, Take 1 tablet (2.5 mg total) by mouth daily, Disp: 30 tablet, Rfl: 5  •  lidocaine-prilocaine (EMLA) cream, Apply topically on the skin over the  Port-A-Cath as advised, Disp: 30 g, Rfl: 1  •  loperamide (IMODIUM) 2 mg capsule, Take 2 mg by mouth 4 (four) times a day as needed for diarrhea, Disp: , Rfl:   •  sodium chloride (OCEAN) 0.65 % nasal spray, Saline Nasal Mist, Disp: , Rfl:   •  docusate sodium (COLACE) 100 mg capsule, Take 1 capsule (100 mg total) by mouth 3 (three) times a day as needed for constipation (Patient not taking: Reported on 7/24/2024), Disp: 40 capsule, Rfl: 1  •  enoxaparin (Lovenox) 40 mg/0.4 mL, Inject 0.4 mL (40 mg total) under the skin in the morning for 7 days Use right or left thigh. (Patient not taking: Reported on 7/11/2024), Disp: 2.8 mL, Rfl: 0  •  ondansetron (ZOFRAN-ODT) 4 mg disintegrating tablet, Take 1 tablet (4 mg total) by mouth every 6 (six) hours as needed for nausea or vomiting (Patient not taking: Reported on 7/24/2024), Disp: 20 tablet, Rfl: 0  •  oxyCODONE (Roxicodone) 5 immediate release tablet, Take 1 tablet (5 mg total) by mouth every 4 (four) hours as needed for moderate pain for up to 12 doses Max Daily Amount: 30 mg (Patient not taking: Reported on 7/24/2024), Disp: 12 tablet, Rfl: 0  •  senna (SENOKOT) 8.6 mg, Take 2 tablets (17.2 mg total) by mouth daily at bedtime (Patient not taking: Reported on 7/11/2024), Disp: 30 tablet, Rfl: 1    Allergies   Allergen Reactions   • Molds & Smuts Hives and Anaphylaxis     Sore throat  Sore throat     • Other Other (See Comments), Anaphylaxis and Hives     Mold/gets sore throat   • Nuts - Food Allergy Hives     BRAZILIAN NUTS                 Lab Results: I have reviewed all pertinent labs.  LABS:  Comprehensive metabolic panel  Status: Final result      Contains abnormal data Comprehensive metabolic panel  Order: [id number]   Status: Final result       Visible to patient: Yes (seen)       Next appt: 04/16/2024 at 02:00 PM in Obstetrics and Gynecology (Mattie Ferrer MD)       Dx: Malignant neoplasm of central portion...    0 Result Notes            Component  Ref Range  & Units 3/6/24 12:23 PM 2/1/24  2:25 PM 1/18/24 12:13 PM 11/16/23 12:37 PM 8/24/23 12:45 PM 5/17/23  1:47 PM 4/5/23 12:47 PM   Sodium  135 - 147 mmol/L 137 138 137 138 137 138 138   Potassium  3.5 - 5.3 mmol/L 4.3 3.8 4.1 4.0 4.2 3.9 4.2   Chloride  96 - 108 mmol/L 104 102 105 103 101 103 105   CO2  21 - 32 mmol/L 28 30 26 28 29 29 27   ANION GAP  mmol/L 5 6 6 7 7 6 R 6 R   BUN  5 - 25 mg/dL 14 15 20 23 18 20 19   Creatinine  0.60 - 1.30 mg/dL 0.66 0.69 CM 0.69 CM 0.69 CM 0.73 CM 0.68 CM 0.74 CM   Comment: Standardized to IDMS reference method   Glucose  65 - 140 mg/dL 93  77 CM 92 CM 92 CM 94 CM 88 CM   Comment: If the patient is fasting, the ADA then defines impaired fasting glucose as > 100 mg/dL and diabetes as > or equal to 123 mg/dL.   Calcium  8.4 - 10.2 mg/dL 9.4 9.4 9.5 10.1 9.4 9.7 9.4   AST  13 - 39 U/L 20  18 17 20 19 18   ALT  7 - 52 U/L 13  13 CM 11 CM 20 CM 16 CM 14 CM   Comment: Specimen collection should occur prior to Sulfasalazine administration due to the potential for falsely depressed results.   Alkaline Phosphatase  34 - 104 U/L 33 Low   35 30 Low  36 38 36   Total Protein  6.4 - 8.4 g/dL 6.6  6.7 6.6 6.5 6.7 6.6   Albumin  3.5 - 5.0 g/dL 4.4  4.3 4.4 4.0 4.3 4.3   Total Bilirubin  0.20 - 1.00 mg/dL 0.50  0.43 CM 0.44 CM 0.44 CM 0.43 CM 0.55 CM   Comment: Use of this assay is not recommended for patients undergoing treatment with eltrombopag due to the potential for falsely elevated results.  N-acetyl-p-benzoquinone imine (metabolite of Acetaminophen) will generate erroneously low results in samples for patients that have taken an overdose of Acetaminophen.   eGFR  ml/min/1.73sq m 94 93 93 93 88 94 87                          Component  Ref Range & Units 7/3/24 12:26 PM 3/6/24 12:23 PM 11/16/23 12:37 PM 8/24/23 12:45 PM   CA 27-29  0.0 - 38.6 U/mL 9.7 14.5 CM 12.9 CM 10.5 CM   Comment: Siemens Reclip.Itaur Immunochemiluminometric Methodology (ICMA)  Values obtained with different assay methods or  kits cannot be used  interchangeably. Results cannot be interpreted as absolute evidence  of the presence or absence of malignant disease.          Ref Range & Units 7/3/24 12:26 PM 3/6/24 12:23 PM 2/1/24  2:25 PM 11/16/23 12:37 PM 11/16/23 12:37 PM 8/24/23 12:45 PM 5/17/23  1:47 PM   WBC  4.31 - 10.16 Thousand/uL 5.57 3.66 Low  5.61  5.72 6.01 4.71   RBC  3.81 - 5.12 Million/uL 4.11 4.07 4.26  4.16 4.30 4.00   Hemoglobin  11.5 - 15.4 g/dL 13.1 12.5 13.5  12.7 13.2 12.3   Hematocrit  34.8 - 46.1 % 39.8 38.7 40.8  39.4 41.8 38.1   MCV  82 - 98 fL 97 95 96  95 97 95   MCH  26.8 - 34.3 pg 31.9 30.7 31.7  30.5 30.7 30.8   MCHC  31.4 - 37.4 g/dL 32.9 32.3 33.1  32.2 31.6 32.3   RDW  11.6 - 15.1 % 11.7 12.0 12.1  12.1 12.4 12.4   MPV  8.9 - 12.7 fL 9.5 9.7 8.8 Low   9.5 9.0 9.3   Platelets  149 - 390 Thousands/uL 222 202 230  252 281 244   nRBC  /100 WBCs 0 0 0   0 0   Segmented %  43 - 75 % 59 51 58 45  61 60   Immature Grans %  0 - 2 % 0 0 0   0 0   Lymphocytes %  14 - 44 % 26 28 27 35  23 21   Monocytes %  4 - 12 % 7 9 7 7  10 7   Eosinophils Relative  0 - 6 % 7 High  10 High  7 High  10 High   5 10 High    Basophils Relative  0 - 1 % 1 2 High  1 3 High   1 2 High    Absolute Neutrophils  1.85 - 7.62 Thousands/µL 3.25 1.89 3.24 2.57 R  3.68 2.80   Absolute Immature Grans  0.00 - 0.20 Thousand/uL 0.00 0.00 0.01   0.02 0.01   Absolute Lymphocytes  0.60 - 4.47 Thousands/µL 1.44 1.02 1.51 2.00 R  1.39 1.00   Absolute Monocytes  0.17 - 1.22 Thousand/µL 0.40 0.31 0.39 0.40 R  0.59 0.34   Eosinophils Absolute  0.00 - 0.61 Thousand/µL 0.40 0.36 0.38 0.57 High  R  0.27 0.49   Basophils Absolute  0.00 - 0.10 Thousands/µL 0.08 0.08 0.08 0.17 High  R  0.06 0.07   Total Counted          RBC Morphology    Normal      Platelet Estimate    Adequate R                                  Results for orders placed or performed during the hospital encounter of 07/25/24   Echo complete w/ contrast if indicated   Result Value Ref Range    BSA  1.63 m2    A4C EF 55 %    LVIDd 3.70 cm    LVIDS 2.30 cm    IVSd 0.90 cm    LVPWd 0.90 cm    FS 38 28 - 44    MV E' Tissue Velocity Septal 10 cm/s    MV E' Tissue Velocity Lateral 12 cm/s    LA Volume Index (BP) 16.5 mL/m2    E/A ratio 0.77     E wave deceleration time 208 ms    MV Peak E Phil 63 cm/s    MV Peak A Phil 0.82 m/s    RVID d 3.6 cm    Tricuspid annular plane systolic excursion 2.50 cm    LA size 2.7 cm    LA length (A2C) 4.20 cm    LA volume (BP) 27 mL    RAA A4C 16.2 cm2    MV stenosis pressure 1/2 time 60 ms    MV valve area p 1/2 method 3.67     TR Peak Phil 2.2 m/s    Triscuspid Valve Regurgitation Peak Gradient 20.0 mmHg    Ao root 3.00 cm    Asc Ao 2.9 cm    Tricuspid valve peak regurgitation velocity 2.22 m/s    Left ventricular stroke volume (2D) 40.00 mL    IVS 0.9 cm    LEFT VENTRICLE SYSTOLIC VOLUME (MOD BIPLANE) 2D 19 mL    LV DIASTOLIC VOLUME (MOD BIPLANE) 2D 59 mL    Left Atrium Area-systolic Four Chamber 12.3 cm2    Left Atrium Area-systolic Apical Two Chamber 11.8 cm2    LVSV, 2D 40 mL    GLS -18 %    LV EF 60     Est. RA pres 5.0 mmHg    Right Ventricular Peak Systolic Pressure 25.00 mmHg         Imaging Studies: I have personally reviewed pertinent reports.    IMPRESSION:     1. Stable exam. No scintigraphic evidence of osseous metastasis.           Workstation performed: [id number]        Imaging    NM bone scan whole body (Order: [id number]) - 12/21/23    MPRESSION:     No new findings to suggest metastatic disease in the chest, abdomen, or pelvis.  New presumed post radiation treatment change in the anterior right upper lobe.           Workstation performed: [id number]        Imaging    CT chest abdomen pelvis w contrast (Order: [id number]) - 12/6/2023  .      Reviewed above test results and discussed with patient and her     Assessment and Plan:  See diagnoses, orders instructions below  Patient is here with her .  This is continuation of care for triple positive right  breast cancer, T2 NX diagnosed in March 2022 . Tumor mass was 2.5 cm  with negative axilla on ultrasound and negative distant metastatic disease.  Path  report was invasive mammary carcinoma, grade 2, ER 70-80% and TN 70-80% and HER2 3+ positive.  Patient received neoadjuvant chemo immunotherapy and she received 6 cycles of TCHP and after that she had lumpectomy and sentinel lymph node sampling  on 09/30/2022 and there was residual 1.8 cm invasive cancer.  Margins were clear. All  8 sentinel lymph nodes were negative for metastatic disease.  Patient received radiation.  Patient had Herceptin plus Perjeta to complete 1 year of therapy and that was completed in April 2023.  Letrozole was started in November 2022 and she will take that for a total of 10 years.  She has osteopenia and she is taking calcium and vitamin D and is  on Prolia. Negative genetic testing for significant variant.  No history of cancers in her family.   Patient is less nervous and anxious now .  Has less tiredness.  She had CT scan and bone scan recently because occasionally she would have has right lower chest anteriorly.  Imaging studies did not show metastatic disease.  She states this pain has been there ever since she had surgery.    Physical examination and test results are as recorded and discussed in detail.  Letrozole will be continued for a total of 10 years.  She will stay on  calcium with vitamin D and Prolia.   She will continue to have echocardiogram for now, once every 6 months.   Discussed all this with patient in detail.  Questions answered  Patient  has Port-A-Cath and she gets that flushed.  Previously discussed that single drug Kadcyla in the adjuvant setting was found to be better than Herceptin but not tested against Herceptin plus Perjeta and I do not think that will be tested.  Discussed importance of self-breast examination, eating healthy foods, staying active and health screening test.  Patient is capable of self-care.   Discussed precautions against coronavirus and flu and other infections.  Goal of therapy will be cure if possible   All discussed in very much detail.  Questions answered.  1. Malignant neoplasm of central portion of right breast in female, estrogen receptor positive (HCC)    - letrozole (FEMARA) 2.5 mg tablet; Take 1 tablet (2.5 mg total) by mouth daily  Dispense: 30 tablet; Refill: 5  - lidocaine-prilocaine (EMLA) cream; Apply topically on the skin over the Port-A-Cath as advised  Dispense: 30 g; Refill: 1  - Echo follow up/limited w/ contrast if indicated; Future  - CBC and differential; Future  - Comprehensive metabolic panel; Future  - Cancer antigen 27.29; Future    2. Port-A-Cath in place    - lidocaine-prilocaine (EMLA) cream; Apply topically on the skin over the Port-A-Cath as advised  Dispense: 30 g; Refill: 1    3. 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 HER2-positive carcinoma of right breast (HCC)      5. 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HCC)    - Echo follow up/limited w/ contrast if indicated; Future    6. Osteoporosis due to aromatase inhibitor        Renewed letrozole and Emla cream.  Blood work prior to next visit in 3 months.  Ordered echocardiogram for January 2025.  Port flush every 6 weeks.  Folbee every 6 months.       Patient will continue to follow with her primary physician and other consultants.  Patient voiced understanding and agrees     This note has been generated by voice recognition software.  Therefore there maybe spelling, grammar, and other errors.  Please contact if questions arise.    Counseling / Coordination of Care  ..  Provided counseling and support

## 2024-07-31 NOTE — PATIENT INSTRUCTIONS
Renewed letrozole and Emla cream.  Blood work prior to next visit in 3 months.  Ordered echocardiogram for January 2025.  Port flush every 6 weeks.  Folbee every 6 months.

## 2024-07-31 NOTE — TELEPHONE ENCOUNTER
"Pt called to report that she forgot to mention to Dr. Jason that last year around this time she had bronchitis and she feels good right now but she mentioned that she did has slight wheezing today which was noted in today's visit and wonders that if she does end up having bronchitis in the near future and is prescribed prednisone, how may days post prolia injection which was administered yesterday, when is it ok to start prednisone if needed? Please advise. I did search on [employer] to see prednisone and prolia interaction and it mentions catalog \"d\" meaning consider therapy modification.   "

## 2024-08-01 NOTE — TELEPHONE ENCOUNTER
Discussed with Dr. Jason  He does not see interaction with the two medications  Mychart message sent to patient

## 2024-08-14 ENCOUNTER — HOSPITAL ENCOUNTER (OUTPATIENT)
Dept: INFUSION CENTER | Facility: CLINIC | Age: 64
Discharge: HOME | End: 2024-08-14

## 2024-08-14 VITALS
SYSTOLIC BLOOD PRESSURE: 103 MMHG | DIASTOLIC BLOOD PRESSURE: 69 MMHG | HEART RATE: 80 BPM | TEMPERATURE: 96.8 F | RESPIRATION RATE: 16 BRPM

## 2024-08-14 NOTE — PROGRESS NOTES
Pt arrived for PORT flush. Offers no new complaints but states that her BP decreased slightly after she received PROLIA.  BP today is 103/69.    PORT accessed without any incident, flushed and de-accessed intact.    Pt tolerated same well.    Next appt for 9/26/2024 at 12:00 noon made and info given to pt.     Was discharged in stable condition.

## 2024-09-13 DIAGNOSIS — M81.8 OSTEOPOROSIS DUE TO AROMATASE INHIBITOR: Primary | ICD-10-CM

## 2024-09-13 DIAGNOSIS — T38.6X5A OSTEOPOROSIS DUE TO AROMATASE INHIBITOR: Primary | ICD-10-CM

## 2024-09-15 DIAGNOSIS — T38.6X5A OSTEOPOROSIS DUE TO AROMATASE INHIBITOR: Primary | ICD-10-CM

## 2024-09-15 DIAGNOSIS — M81.8 OSTEOPOROSIS DUE TO AROMATASE INHIBITOR: Primary | ICD-10-CM

## 2024-09-23 ENCOUNTER — TELEPHONE (OUTPATIENT)
Dept: OBGYN CLINIC | Facility: HOSPITAL | Age: 64
End: 2024-09-23

## 2024-09-23 NOTE — TELEPHONE ENCOUNTER
Called and spoke w/pt and relayed JARRET Duran's msg. Offered to make appt w/Dr Lee. She will CB tomorrow after she checks w/'s schedule to make appt w/Dr Lee. No further questions/concerns.

## 2024-09-23 NOTE — TELEPHONE ENCOUNTER
Caller: Emily    Doctor: Lizandro    Reason for call: She believes her thumb is getting better. The patient Is calling to say  she thinks the 3 rd shot will help.    Call back#: [phone]

## 2024-09-23 NOTE — TELEPHONE ENCOUNTER
Caller: Emily     Doctor: Lizandro Duran /     Reason for call: Patient is seen by Dr. Lee for her right thumb trigger finger.    There is no available appointment until November.    Patient is requesting to see Lizandro Duran for her 3rd CSI injection.    Please advise     Call back#: [phone]

## 2024-09-23 NOTE — TELEPHONE ENCOUNTER
We do not perform more than 2 injections per finger for trigger fingers.  After 2 injections there is a risk of tendon rupture which is a much larger problem to deal with.  If she is still experiencing symptoms of her trigger thumb she should make an appointment with Dr. Lee to discuss possible surgical intervention.

## 2024-09-26 ENCOUNTER — HOSPITAL ENCOUNTER (OUTPATIENT)
Dept: INFUSION CENTER | Facility: CLINIC | Age: 64
Discharge: HOME | End: 2024-09-26
Payer: COMMERCIAL

## 2024-09-26 DIAGNOSIS — C50.111 MALIGNANT NEOPLASM OF CENTRAL PORTION OF RIGHT BREAST IN FEMALE, ESTROGEN RECEPTOR POSITIVE (HCC): ICD-10-CM

## 2024-09-26 DIAGNOSIS — Z17.0 MALIGNANT NEOPLASM OF CENTRAL PORTION OF RIGHT BREAST IN FEMALE, ESTROGEN RECEPTOR POSITIVE (HCC): ICD-10-CM

## 2024-09-26 DIAGNOSIS — C77.3 MALIGNANT NEOPLASM METASTATIC TO LYMPH NODE OF AXILLA (HCC): ICD-10-CM

## 2024-09-26 DIAGNOSIS — Z95.828 PORT-A-CATH IN PLACE: Primary | ICD-10-CM

## 2024-09-26 LAB
ALBUMIN SERPL BCG-MCNC: 4.4 G/DL (ref 3.5–5)
ALP SERPL-CCNC: 37 U/L (ref 34–104)
ALT SERPL W P-5'-P-CCNC: 13 U/L (ref 7–52)
ANION GAP SERPL CALCULATED.3IONS-SCNC: 6 MMOL/L (ref 4–13)
AST SERPL W P-5'-P-CCNC: 18 U/L (ref 13–39)
BASOPHILS # BLD AUTO: 0.09 THOUSANDS/ΜL (ref 0–0.1)
BASOPHILS NFR BLD AUTO: 2 % (ref 0–1)
BILIRUB SERPL-MCNC: 0.51 MG/DL (ref 0.2–1)
BUN SERPL-MCNC: 17 MG/DL (ref 5–25)
CALCIUM SERPL-MCNC: 9.2 MG/DL (ref 8.4–10.2)
CHLORIDE SERPL-SCNC: 105 MMOL/L (ref 96–108)
CO2 SERPL-SCNC: 27 MMOL/L (ref 21–32)
CREAT SERPL-MCNC: 0.65 MG/DL (ref 0.6–1.3)
EOSINOPHIL # BLD AUTO: 0.29 THOUSAND/ΜL (ref 0–0.61)
EOSINOPHIL NFR BLD AUTO: 5 % (ref 0–6)
ERYTHROCYTE [DISTWIDTH] IN BLOOD BY AUTOMATED COUNT: 12.2 % (ref 11.6–15.1)
GFR SERPL CREATININE-BSD FRML MDRD: 94 ML/MIN/1.73SQ M
GLUCOSE SERPL-MCNC: 91 MG/DL (ref 65–140)
HCT VFR BLD AUTO: 39.2 % (ref 34.8–46.1)
HGB BLD-MCNC: 12.4 G/DL (ref 11.5–15.4)
IMM GRANULOCYTES # BLD AUTO: 0.06 THOUSAND/UL (ref 0–0.2)
IMM GRANULOCYTES NFR BLD AUTO: 1 % (ref 0–2)
LYMPHOCYTES # BLD AUTO: 1.14 THOUSANDS/ΜL (ref 0.6–4.47)
LYMPHOCYTES NFR BLD AUTO: 21 % (ref 14–44)
MCH RBC QN AUTO: 31.2 PG (ref 26.8–34.3)
MCHC RBC AUTO-ENTMCNC: 31.6 G/DL (ref 31.4–37.4)
MCV RBC AUTO: 99 FL (ref 82–98)
MONOCYTES # BLD AUTO: 0.36 THOUSAND/ΜL (ref 0.17–1.22)
MONOCYTES NFR BLD AUTO: 7 % (ref 4–12)
NEUTROPHILS # BLD AUTO: 3.62 THOUSANDS/ΜL (ref 1.85–7.62)
NEUTS SEG NFR BLD AUTO: 64 % (ref 43–75)
NRBC BLD AUTO-RTO: 0 /100 WBCS
PLATELET # BLD AUTO: 230 THOUSANDS/UL (ref 149–390)
PMV BLD AUTO: 10 FL (ref 8.9–12.7)
POTASSIUM SERPL-SCNC: 4.1 MMOL/L (ref 3.5–5.3)
PROT SERPL-MCNC: 6.5 G/DL (ref 6.4–8.4)
RBC # BLD AUTO: 3.98 MILLION/UL (ref 3.81–5.12)
SODIUM SERPL-SCNC: 138 MMOL/L (ref 135–147)
WBC # BLD AUTO: 5.56 THOUSAND/UL (ref 4.31–10.16)

## 2024-09-26 PROCEDURE — 86300 IMMUNOASSAY TUMOR CA 15-3: CPT

## 2024-09-26 PROCEDURE — 85025 COMPLETE CBC W/AUTO DIFF WBC: CPT

## 2024-09-26 PROCEDURE — 80053 COMPREHEN METABOLIC PANEL: CPT

## 2024-09-26 NOTE — PROGRESS NOTES
Pt without complaint. Pt tolerated port flush and central labs drawn from port per [facility] protocol. Pt declines AVS today, aware of next appt scheduled for 11/7/24 1200

## 2024-09-28 LAB — CANCER AG27-29 SERPL-ACNC: 17.9 U/ML (ref 0–38.6)

## 2024-10-09 ENCOUNTER — OFFICE VISIT (OUTPATIENT)
Dept: OBGYN CLINIC | Facility: HOSPITAL | Age: 64
End: 2024-10-09
Payer: COMMERCIAL

## 2024-10-09 VITALS — BODY MASS INDEX: 21.33 KG/M2 | WEIGHT: 128 LBS | HEIGHT: 65 IN

## 2024-10-09 DIAGNOSIS — M65.311 TRIGGER THUMB OF RIGHT HAND: Primary | ICD-10-CM

## 2024-10-09 PROCEDURE — 99214 OFFICE O/P EST MOD 30 MIN: CPT | Performed by: ORTHOPAEDIC SURGERY

## 2024-10-09 RX ORDER — LIDOCAINE HYDROCHLORIDE AND EPINEPHRINE 10; 10 MG/ML; UG/ML
20 INJECTION, SOLUTION INFILTRATION; PERINEURAL ONCE
OUTPATIENT
Start: 2024-10-09 | End: 2024-10-09

## 2024-10-09 NOTE — PROGRESS NOTES
ASSESSMENT/PLAN:    Assessment:   Right trigger thumb  Right CMC OA (asymptomatic)    Plan:   The patient has an examination consistent with right trigger thumb  I have discussed with the patient the pathophysiology of this diagnosis and reviewed how the examination correlates with this diagnosis.  Discussed with the patient that she has already had 2 injection then next step in treatment would be surgical intervention.     Right trigger thumb release (under local)    Follow Up:  After Surgery    To Do Next Visit:  Sutures out    Operative Discussions:  Trigger Finger Release: The anatomy and physiology of trigger finger was discussed with the patient today in the office.  Edema and increased contact pressure within the flexor tendons at the A1 pulley can cause pain, crepitation, and limitation of function.  Treatment options include resting MP blocking splints to decrease edema, oral anti-inflammatory medications, home or formal therapy exercises, up to 2 steroid injections or surgical release.  While majority of patients do respond to conservative treatment, up to 20% may require surgical release.  The patient has elected release of the trigger finger.  The patient has elected to undergo a release of the A1 pulley (trigger finger).  A small incision will be made over the palmar aspect of the hand, the tendon sheath holding the flexor tendons will be released.  In the postoperative period, light activities are allowed immediately, driving is allowed when narcotic medication has stopped, and the incision may get wet after 2 days.  Heavy activities (lifting more than approximately 10 pounds) will be allowed after the follow up appointment in 1-2 weeks.  While the pain and discomfort within the wrist typically improves rapidly, some residual discomfort may be present for up to 6 weeks.  The nodule that is typically palpable in the palmar aspect of the hand will not be removed, as this would necessitate removal of a  portion of the flexor tendon, however the catching, clicking, and locking should resolve.  Approximate success rate is 98%.The risks and benefits of the procedure were explained to the patient, which include, but are not limited to: Bleeding, infection, recurrence, pain, scar, damage to tendons, damage to nerves, and damage to blood vessels, need for future surgery and complications related to anesthesia.  If bony work is done, risks also include malunion and nonunion.  These risks, along with alternative conservative treatment options, and postoperative protocols were voiced back and understood by the patient.  All questions were answered to the patient's satisfaction.  The patient agrees to comply with a standard postoperative protocol, and is willing to proceed.  Education was provided via written and auditory forms.  There were no barriers to learning. Written handouts regarding wound care, incision and scar care, and general preoperative information, as well as risks and benefits were provided to the patient.      _____________________________________________________  CHIEF COMPLAINT:  Chief Complaint   Patient presents with    Right Wrist - Carpal Tunnel    Right Thumb - Triggering     TF Injection - #2 6/5  CMC - No Injection          SUBJECTIVE:  Emily Hernandez is a 64 y.o. female who presents for follow up regarding right trigger thumb  Since last visit, Emily Hernandez has tried steroid injections 6/05/24 with only partial relief.  Today there is Catching and Locking to the right thumb.  Symptoms started to return 8/2024  Radiation: None  Associated symptoms: Catching and Locking  Handedness: right      PAST MEDICAL HISTORY:  Past Medical History:   Diagnosis Date    Abnormal Pap smear of cervix 2010    Followed up following year with normal pap smear    Anemia March 28,2022    Due to infusion therapy?    Asthma 2018 2018 to Jan 2021, i was diagnosed with asthma, jan 2021, i did nit have asthma according  to the breathing test the asthma doctor performed, i get allergiy shot every 2 weeks for 2 different types of molds    Cancer (HCC)     breast    Diarrhea     occasional    Port-A-Cath in place     R side chest    Varicella 1971    I have not had it since then.       PAST SURGICAL HISTORY:  Past Surgical History:   Procedure Laterality Date    BREAST BIOPSY Right 03/29/2022    [facility]    BREAST LUMPECTOMY Right 09/30/2022    Procedure: ML BREAST;  Surgeon: Azalea Motta MD;  Location: [facility];  Service: Surgical Oncology    CATARACT EXTRACTION Left 2019    with lens implant    IR PORT PLACEMENT  04/20/2022    LYMPH NODE BIOPSY Right 09/30/2022    Procedure: SLN BX;  Surgeon: Azalea Motta MD;  Location: [facility];  Service: Surgical Oncology    MANDIBLE SURGERY      jaw surgery for crooked jaw 1993, 1998    MA GRAFTING OF AUTOLOGOUS FAT BY LIPO 50 CC OR LESS Right 3/13/2024    Procedure: FAT GRAFTING TO RIGHT BREAST.;  Surgeon: Zandra Diamond MD;  Location: [facility];  Service: Plastics    MA MASTOPEXY Left 3/13/2024    Procedure: LEFT MASTOPEXY BREAST FOR SYMMETRY;  Surgeon: Zandra Diamond MD;  [facility];  Service: Plastics    ROTATOR CUFF REPAIR Right 2020    US BREAST CLIP NEEDLE LOC RIGHT Right 03/29/2022    US GUIDED BREAST BIOPSY RIGHT COMPLETE Right 03/29/2022       FAMILY HISTORY:  Family History   Problem Relation Age of Onset    Prostate cancer Father     Asthma Father         He has had asthma previously    No Known Problems Sister     Asthma Brother         Has had asthma previously    No Known Problems Daughter     No Known Problems Daughter     Lung cancer Maternal Aunt     No Known Problems Paternal Aunt     No Known Problems Paternal Aunt     No Known Problems Maternal Grandmother     No Known Problems Maternal Grandfather     Breast cancer Paternal Grandmother 80        I was told she had breast cancer in her 80’s.    No Known Problems Paternal Grandfather     Stomach  cancer Other        SOCIAL HISTORY:  Social History     Tobacco Use    Smoking status: Never     Passive exposure: Never    Smokeless tobacco: Never   Vaping Use    Vaping status: Never Used   Substance Use Topics    Alcohol use: Not Currently    Drug use: Never       MEDICATIONS:    Current Outpatient Medications:     albuterol (2.5 mg/3 mL) 0.083 % nebulizer solution, USE 1 VIAL IN NEBULIZER EVERY 4 HOURS AS NEEDED FOR WHEEZING, Disp: , Rfl:     albuterol (PROVENTIL HFA,VENTOLIN HFA) 90 mcg/act inhaler, , Disp: , Rfl:     cetirizine (ZyrTEC) 10 mg tablet, Take 10 mg by mouth if needed, Disp: , Rfl:     denosumab (Prolia) 60 mg/mL, as directed Subcutaneous, Disp: , Rfl:     EPINEPHrine (EPIPEN) 0.3 mg/0.3 mL SOAJ, , Disp: , Rfl:     letrozole (FEMARA) 2.5 mg tablet, Take 1 tablet (2.5 mg total) by mouth daily, Disp: 30 tablet, Rfl: 5    lidocaine-prilocaine (EMLA) cream, Apply topically on the skin over the Port-A-Cath as advised, Disp: 30 g, Rfl: 1    loperamide (IMODIUM) 2 mg capsule, Take 2 mg by mouth 4 (four) times a day as needed for diarrhea, Disp: , Rfl:     sodium chloride (OCEAN) 0.65 % nasal spray, Saline Nasal Mist, Disp: , Rfl:     docusate sodium (COLACE) 100 mg capsule, Take 1 capsule (100 mg total) by mouth 3 (three) times a day as needed for constipation (Patient not taking: Reported on 7/24/2024), Disp: 40 capsule, Rfl: 1    enoxaparin (Lovenox) 40 mg/0.4 mL, Inject 0.4 mL (40 mg total) under the skin in the morning for 7 days Use right or left thigh. (Patient not taking: Reported on 7/11/2024), Disp: 2.8 mL, Rfl: 0    ondansetron (ZOFRAN-ODT) 4 mg disintegrating tablet, Take 1 tablet (4 mg total) by mouth every 6 (six) hours as needed for nausea or vomiting (Patient not taking: Reported on 7/24/2024), Disp: 20 tablet, Rfl: 0    oxyCODONE (Roxicodone) 5 immediate release tablet, Take 1 tablet (5 mg total) by mouth every 4 (four) hours as needed for moderate pain for up to 12 doses Max Daily  "Amount: 30 mg (Patient not taking: Reported on 7/24/2024), Disp: 12 tablet, Rfl: 0    senna (SENOKOT) 8.6 mg, Take 2 tablets (17.2 mg total) by mouth daily at bedtime (Patient not taking: Reported on 7/11/2024), Disp: 30 tablet, Rfl: 1    ALLERGIES:  Allergies   Allergen Reactions    Molds & Smuts Hives and Anaphylaxis     Sore throat  Sore throat      Other Other (See Comments), Anaphylaxis and Hives     Mold/gets sore throat    Nuts - Food Allergy Hives     BRAZILIAN NUTS           REVIEW OF SYSTEMS:  Pertinent items are noted in HPI.  A comprehensive review of systems was negative.    LABS:  HgA1c: No results found for: \"HGBA1C\"  BMP:   Lab Results   Component Value Date    CALCIUM 9.2 09/26/2024    K 4.1 09/26/2024    CO2 27 09/26/2024     09/26/2024    BUN 17 09/26/2024    CREATININE 0.65 09/26/2024           _____________________________________________________  PHYSICAL EXAMINATION:  Vital signs: Ht 5' 5\" (1.651 m)   Wt 58.1 kg (128 lb)   BMI 21.30 kg/m²   General: well developed and well nourished, alert, oriented times 3, and appears comfortable  Psychiatric: Normal  HEENT: Trachea Midline, No torticollis  Cardiovascular: No discernable arrhythmia  Pulmonary: No wheezing or stridor  Abdomen: No rebound or guarding  Extremities: No peripheral edema  Skin: No masses, erythema, lacerations, fluctation, ulcerations  Neurovascular: Sensation Intact to the Median, Ulnar, Radial Nerve, Motor Intact to the Median, Ulnar, Radial Nerve, and Pulses Intact    MUSCULOSKELETAL EXAMINATION:  Right thumb:  Positive palpable nodule over the A1 pulley.  Positive tenderness to palpation over A1 pulley. Positive catching. Positive clicking.   Positive shoulder sign    _____________________________________________________  STUDIES REVIEWED:  No Studies to review      PROCEDURES PERFORMED:  Procedures  No Procedures performed today  Scribe Attestation      I,:  Britt Lenz MA am acting as a scribe while in the " presence of the attending physician.:       I,:  Arnie Lee MD personally performed the services described in this documentation    as scribed in my presence.:

## 2024-10-09 NOTE — PATIENT INSTRUCTIONS
[facility]  Arnie Lee MD  [place] [address]  [phone]  You have chosen to undergo surgery for your condition. Any surgery is associated with risks of potential complications. Certain medical problems such as smoking, diabetes, thyroid disease, neuropathy, malnutrition or bleeding problems may increase your risk of complications.  Complications after surgery are rare but include the following:  Bleeding Infection  Scar Pain  Tenderness Problems with healing  Damage to nerves Damage to blood vessels  Lack of desired results Need for further surgery  Numbness Stiffness  Problems with anesthesia Blood clots  Need for hardware removal (if inserted)    If bony work is done, other risks include:  Delayed bone healing  Lack of bone healing  Bones healing in wrong position    While these risks and complications are rare and infrequent they are still important to know and discuss. If you have any other questions, please let me know.  _____________________________________________________________________________________  It can be difficult, but it is possible to get on with your life with only one hand for the first few weeks after your operation. The following are a few suggestions that may be helpful when you're recovering from your hand problem or from your hand surgery. While most of these suggestions are really only applicable if your dominant or your writing hand is affected, some apply to problems involving either hand. Most daily activities can be accomplished with some modifications, rather than the need for store bought devices.    Before surgery, if you can:  Ask for help: Have others help you with: childcare, housework, and meals.  Practice: Dressing, undressing, using the toilet, brushing your teeth, showering.  Prepare: for the first few days after surgery.  Open and re-sealed cans and bottles that  you may need.  Open medication containers and leave them easy-to-read open. Make sure you put these medication containers out of reach of children, even if you don't expect children to visit you.  Prepare and think about “no-cut”meals-sandwiches, ground meats, etc.    It helps to have…  In the shower  Plastic bags and rubber bands to cover bandages-the ray that newspapers come in are good. Also, small trashcan liners will work. Use 2 of these at a time. The other option is an oversized rubber glove that may be purchased at a food store to aid in dishwashing.  A bottle sponge (soft sponge on a long stick)-for the armpit of your“good hand”  Shower brush  At hairbrush in the shower will help you to wash your hair  A cotton terrycloth bathrobe to aid you in drying your back    In the bathroom  Toothpaste, shampoo, etc. in a flip top or pump dispenser.  Flossers (dental floss on a“Y”shaped handle)  Consider an electric razor    In the bedroom  Back scratcher  Large sleeve shirts and tops  Put away clothing which buttons, fastens or snaps in the back, or which uses drawstrings    In the kitchen  A rubber jar opener [place]-to help open jars, but also to keep things from sliding around while you are working on them.  Double suction cup pads (“the little octopus”)-to hold items while you use or wash them  An electric can opener with a lid magnet strong enough to hold the can in the air-for one-handed use.  Consider a“wash and wear” haircut  Good [place]!    Incision & Scar Care  You should keep your bandages dry and clean; change your dressings if you see drainage coming through your dressings.  Signs of infection include increased pain, swelling, redness, warmth, and excessive or foul-smelling drainage. Please contact our office if you experience signs of infection.  You may wash with soap and water after given permission.  Sutures will be removed between 7-14 days after surgery if the wound is healed. Patients who smoke, have  diabetes, or have nutritional problems may need longer to heal.  Steri-strips may be placed across your incision at this time, which will fall off or peel away.  To help prevent infection, do not submerse your incision area for 2-3 weeks (i.e. no hot tubs, pools, ocean, dish water, fish ponds, fish tanks, bathtubs).  Swelling, bruising, and numbness are common after surgery. To help reduce these symptoms, keep the area elevated.    Scar Care: To improve the appearance of your scar, you can massage the healed area (using circular motions with your fingertip) for 5 minutes 3x a day after your steri-strips peel away. You can use regular hand lotion or Scar zone from the store. You may also use Silicone pads after the stitches are removed (available at the store). Redness and bumpiness of the scar are expected. These generally improve as healing progresses, but redness can be expected for up to 6-8 months.  ** If you have any questions or concerns, please call our office. [phone]  _____________________________________________________________________________________  Pain Management  Pain after an injury or surgery is common and should often be expected. There are many ways to manage and reduce this pain. This often does not include medications or may not exclusively include medication. Each patient, surgery and surgeon are unique, and the approach to management of pain is individual.  It is important to try to discuss your concerns and expectations regarding pain with your surgical team before and after surgery. They want to help you get better and have a good patient experience. Your patient experience includes understanding and treating your pain.  Here's what you may expect before surgery and how to manage your pain and medications after surgery. Again, the approach to pain management after injury or surgery is individualized, and this is general information. Your surgical team will have more specific  recommendations for you. Before using any of the methods explored here, please discuss with your medical team if these pain management methods are appropriate for you. We advise good communication with your team to let them help you achieve the best outcome.    Surgery Day  As your surgery begins, your surgeon and anesthesia team may give you medications by mouth, IV, and/or injection. Giving you medication as the surgery progresses not only helps you to decrease pain during the surgery, but it also reduces your pain post-surgery. You may also receive medication in the recovery room after surgery, if needed. In some cases, you will receive prescription pain medication and instructions for its use to use at home in the days following your surgery. You may also receive instructions on using over-the-counter pain medications. It is important to follow these directions carefully, as many over-the counter medications contain some of the same ingredients as prescription pain medications and using them together can result in a dangerous accidental overdose.    Post-Surgery Pain Management  While always important to follow your specific postoperative instructions, here are some different methods, outside of medication, that your team may recommend to reduce your pain:   Elevate: Elevating the injured area so it is higher than your heart can reduce swelling and pain. Swelling can increase quickly by putting your hand at your side, and this can make your dressing feel tight. Often, the pain associated with swelling is difficult to control, so it is best to avoid this problem.   Take care of your dressing: If your dressing/splint feels tight, and elevation for 10 minutes does not improve the tight sensation, contact your surgical team. It may be recommended that you unravel any tape or elastic wrap and loosen the outer bandage. If this does not help, you may be advised to tear, unravel, or cut the inner layers with blunt  tipped scissors. Make sure you are cutting on the opposite side of where your incision is located. When done, you will need to try to rebuild your dressing to keep your wound clean and covered. Before doing any of this, check with your medical team. They may want to be aware of the tight dressing and could have different instructions for loosening the dressing.   Keep moving: If allowed by your surgeon, try to frequently move the fingers, wrist, elbow, and/or shoulder that are outside of the splint or cast. You can do this gently and slowly. This improves blood flow, which limits swelling and prevents bandages from feeling tight. It may be uncomfortable to move at first, but the discomfort will often improve with time and frequently improves with motion. Your surgeon will be more specific about what to move and what to rest.   Ice the area: Icing the painful area will typically reduce swelling and inflammation and reduce pain. However, there may be certain procedures (such as surgery on arteries, skin grafts or flaps) where ice could be harmful, so consult your surgeon before using ice.   Heat the area: If you are in the phase of care where you can remove your dressing or splint, you may be able to try heat. Heat increases blood flow to an area and can help with muscle spasms, muscle soreness and joint pain.   Avoid smoking: Chemicals present in cigarettes can increase pain. Reducing or quitting smoking can improve your pain.   Consume vitamin C: Consuming 500mg of vitamin C daily for 6 weeks may reduce pain after some injuries. However, it is ascorbic acid, which can upset your stomach if you have heartburn or gastritis.    Post-Surgery Medication Management  The pain-management methods listed above are often effective when used in combination with taking medications post-surgery. There are many different classes of medication that can help pain. Some can be purchased over the counter, and some require a  prescription. All medicines can have some benefits and some adverse reactions/side effects. Your surgical team will balance these issues to provide a plan for you. Some commonly prescribed medications can include:   Tylenol (Acetaminophen)   Aleve (Naprosyn/naproxen)   Motrin/Advil (Ibuprofen)   Celebrex (Celecoxib)   Toradol (Ketorolac)    When taking medication, keep the following in mind:   It may take 30-60 minutes for your body to absorb the medication after you take it by mouth, so be patient.   Longer-acting medications used before bedtime may help you sleep better the first few nights after surgery.   The first few nights post-surgery will generally be the toughest.   Do not exceed the dose recommended by your physician or combine medications without consulting with your physician.    If you are unfamiliar with these medications, your surgeon can specify how much medication you should take, for how long, and how often.      Opioids  Opioids are a type of pain medication made from the poppy plant that is used to make opium and heroin. They can be effective in treating pain, but opioids should be used at a last resort, in limited amounts, and for a limited number of days. Use of these medications should only be done under the guidance of your doctor. When taking opioids, you are at risk of becoming dependent on the medicine, and they may become less effective over time.  Oxycodone and hydrocodone are two of the most commonly used and effective opioid “pain” pills. These pills are frequently combined with Tylenol (acetaminophen), but it must be done carefully. Be sure to consult your surgeon before doing so. Your surgeon will give you a customized plan for managing your pain based on your type of surgery, number of procedures, duration of surgery, etc. Keep in mind that many opioids are combined with Tylenol (acetaminophen) already in the pill, so take care to follow your prescribed directions and not to take  more opioids or acetaminophen than prescribed. Overdoses of each of these medications can be dangerous and life threatening. Learn more about opioids, including the side effects, how to safely use them, and how to properly dispose of any extras.    Following the program below will greatly decrease your post-operative pain.  1. Aleve (naproxen) 220 mg and Tylenol Arthritis 650 mg on the afternoon/evening of surgery. Do NOT take Aleve if you have a history of gastric ulcers, uncontrolled reflux or have been told previously by a physician that you should not take anti-inflammatory medications such as Advil/Aleve/Motrin.  2. Aleve (naproxen) 220 mg in the morning and afternoon, for about 2-3 days after the surgery; even if you have no pain. You can stop two days after surgery if your hand does not hurt.  3. Tylenol Arthritis (or any brand of acetaminophen 8-hour), 650 mg every eight hours, with a maximum dose of 3000 mg per day, for about 2-3 days after the surgery, even if you have no pain. Tylenol Arthritis plus Aleve is a case of 1+1=3, not 2. That is, they work together as a team to make each other stronger a. The maximum amount of Tylenol is 3000 mg per day, which is the same as 4 of the 650 mg pills. Remember; don't substitute any other medication for the Tylenol: don't take Motrin, aspirin, or any other over the counter medication. It must be Tylenol (or any brand of acetaminophen) for it to work as a team. Remember as well that Tylenol Arthritis is taken every 8 hours, the Aleve is twice a day.  4. Norco (hydrocodone/acetaminophen) 5/325 mg or a similar medication to assist with sleeping at night, and possibly every 6 hours during the day, ONLY IF NEEDED, for the first few days. You will be given a written prescription, but many patients find they do not need to take any or all of the Norco. Do not take the medication just because it was given to you; only take it if you need it. Remember that Norco is an opioid  pain medication and can lead to addiction, respiratory sedation, and death. In 2015 over 17,500 Americans  from opioid overdoses and I don't want this to happen to you. Opioid medications can also cause constipation, so please plan for that, as well as possible mental confusion and drowsiness. Do not drive while you are taking this medication.  With this protocol, you can expect your post-operative pain to be very manageable. The worst pain only lasts for the first 48 hours and improves significantly after that. By the time you see your surgeon for your post-operative visit you probably will no longer require any pain medication on a daily basis.    Important Information about Painkillers  You are being prescribed an opioid pain medication to help with severe pain after surgery. Use the medication sparingly as needed to reduce your pain. The goal is not to be pain-free, but to make the pain more tolerable. If you are able to take non-steroidal anti-inflammatory drugs (NSAIDs), alternate the prescription pain medication with over-the-counter Ibuprofen or Naproxen (if you do not have a history of reflux or stomach ulcers). This will allow you to take less opioid medication. Also, elevate the hand to reduce swelling and consider applying ice to the affected area for 15 minutes at a time, several times per day. Opioid medication is powerful and has the risk of overdose, abuse, and addiction. Only use the medication as directed by your physician and keep the pills in a safe place. When you no longer need the medication, please dispose of the pills properly as directed below. Allowing someone else to use your opioid prescription is illegal.  Also, possible side effects from opioid medications are over-sedation, itching, nausea/vomiting, and constipation. Do not drive a vehicle or operate machinery while taking the pain medications. Drink plenty of fluids and consider a stool softener to prevent constipation.  If the  pain you are experiencing is not severe, stop taking the opioid pills and only take over-the-counter medications such as Tylenol and Ibuprofen.  Disposing of unused pain medications:  (1) Follow pharmacist instructions on the bottle if available, or  (2) Call 1-594.434.5608 for a [employer] authorized collection site in your area, or  (3) If no collection site is available in your area, mix the pills with an undesirable substance such as used coffee grounds, annita litter, or dirt. Place this mixture in a sealed plastic bag. Place the bag in the household garbage.  Adapted from the opioid awareness section of the [employer], thanks to Jeannie Guzman and Wesley Acharya

## 2024-10-24 ENCOUNTER — TELEPHONE (OUTPATIENT)
Dept: PLASTIC SURGERY | Facility: CLINIC | Age: 64
End: 2024-10-24

## 2024-10-24 NOTE — TELEPHONE ENCOUNTER
Received call from patient asking if she can move her appt of 12/13/2024 at 11:30 with Dr Diamond in [facility]. Offered 1/3/2024 but patient decided against that date. Patient opted to keep appt as is.     Patient also verbalized she feels off today and has a headache/upset stomach. Per Ramila recommended she go to PCP as that seems unrelated to her surgery.     Patient verbalized understanding.

## 2024-10-30 ENCOUNTER — OFFICE VISIT (OUTPATIENT)
Dept: HEMATOLOGY ONCOLOGY | Facility: CLINIC | Age: 64
End: 2024-10-30
Payer: COMMERCIAL

## 2024-10-30 VITALS
HEART RATE: 87 BPM | DIASTOLIC BLOOD PRESSURE: 68 MMHG | TEMPERATURE: 98 F | OXYGEN SATURATION: 98 % | BODY MASS INDEX: 21.49 KG/M2 | RESPIRATION RATE: 16 BRPM | SYSTOLIC BLOOD PRESSURE: 128 MMHG | HEIGHT: 65 IN | WEIGHT: 129 LBS

## 2024-10-30 DIAGNOSIS — M81.8 OSTEOPOROSIS DUE TO AROMATASE INHIBITOR: ICD-10-CM

## 2024-10-30 DIAGNOSIS — Z17.0 MALIGNANT NEOPLASM OF CENTRAL PORTION OF RIGHT BREAST IN FEMALE, ESTROGEN RECEPTOR POSITIVE (HCC): Primary | ICD-10-CM

## 2024-10-30 DIAGNOSIS — Z95.828 PORT-A-CATH IN PLACE: ICD-10-CM

## 2024-10-30 DIAGNOSIS — Z17.31 HER2-POSITIVE CARCINOMA OF RIGHT BREAST (HCC): ICD-10-CM

## 2024-10-30 DIAGNOSIS — T38.6X5A OSTEOPOROSIS DUE TO AROMATASE INHIBITOR: ICD-10-CM

## 2024-10-30 DIAGNOSIS — C77.3 MALIGNANT NEOPLASM METASTATIC TO LYMPH NODE OF AXILLA (HCC): ICD-10-CM

## 2024-10-30 DIAGNOSIS — C50.111 MALIGNANT NEOPLASM OF CENTRAL PORTION OF RIGHT BREAST IN FEMALE, ESTROGEN RECEPTOR POSITIVE (HCC): Primary | ICD-10-CM

## 2024-10-30 DIAGNOSIS — C50.911 HER2-POSITIVE CARCINOMA OF RIGHT BREAST (HCC): ICD-10-CM

## 2024-10-30 PROCEDURE — 99214 OFFICE O/P EST MOD 30 MIN: CPT | Performed by: INTERNAL MEDICINE

## 2024-10-30 RX ORDER — LIDOCAINE/PRILOCAINE 2.5 %-2.5%
CREAM (GRAM) TOPICAL
Qty: 30 G | Refills: 1 | Status: SHIPPED | OUTPATIENT
Start: 2024-10-30

## 2024-10-30 RX ORDER — LETROZOLE 2.5 MG/1
2.5 TABLET, FILM COATED ORAL DAILY
Qty: 30 TABLET | Refills: 5 | Status: SHIPPED | OUTPATIENT
Start: 2024-10-30

## 2024-10-30 NOTE — PATIENT INSTRUCTIONS
Patient will get port flushed every 6 weeks.  He will get blood work on 1/22/225 at the infusion center at the time of port flush and Prolia will be a week after.  No change in letrozole and Prolia.  Follow-up in 4 months.

## 2024-10-30 NOTE — PROGRESS NOTES
HPI: Patient is here with her .  Oncological follow-up visit for triple positive (ER positive, WI positive and HER2 positive) right breast cancer, T2 NX that was diagnosed in March 2022 . Tumor mass was 2.5 cm  with negative axilla on ultrasound and negative distant metastatic disease.  Path  report was invasive mammary carcinoma, grade 2, ER 70-80% and WI 70-80% and HER2 3+ positive.  Patient received neoadjuvant chemo immunotherapy and she received 6 cycles of TCHP and after that she had lumpectomy and sentinel lymph node sampling  on 09/30/2022 and there was residual 1.8 cm invasive cancer.  Margins were clear. All  8 sentinel lymph nodes were negative for metastatic disease.  Patient received radiation.  Patient had Herceptin plus Perjeta to complete 1 year of therapy and that was completed in April 2023.  Letrozole was started in November 2022 and she will take that for a total of 10 years.  She has osteopenia and she is taking calcium and vitamin D and is  on Prolia. Negative genetic testing for significant variant.  No history of cancers in her family.   Patient is  nervous and anxious now .  Has less tiredness.  She had CT scan and bone scan in December 2023 and imaging studies did not show metastatic disease.     ROS:   Reviewed 12 systems: See symptoms in HPI  Presently no other neurological, cardiac, pulmonary, GI and  symptoms other than listed in HPI.  Other symptoms are in HPI.  No fever, chills, bleeding, skin rash, weight loss, night sweats, arthritic symptoms,   weakness, numbness, claudication and gait problem. No frequent infections.  Not unusually sensitive to heat or cold. No swelling of the ankles. No swollen glands.  Patient is  anxious.      Physical Exam:     10/30/24 10/9/24 8/14/24   Blood Pressure 128/68 -- 103/69   Pulse 87 -- 80   Respirations 16 -- 16   Temperature 98 °F (36.7 °C) -- 96.8 °F (36 °C) (Abnormal)     Temp Source Temporal -- Temporal   SpO2 98 % -- --   Weight -  "Scale 58.5 kg (129 lb) 58.1 kg (128 lb) --   Height 5' 5\" (1.651 m) 5' 5\" (1.651 m) --   Pain Score Two -- --   Patient is alert and oriented.  Patient is not in distress.  Vitals are above.  There is no icterus.  There is no oral thrush.  There is no palpable neck mass.  Lung fields are clear to percussion and auscultation.  Heart rate is regular.  There is no palpable abdominal mass.  Abdomen is soft and nontender.  There is no ascites.  There is no edema of the ankles.  There is no calf tenderness.  There is no   focal neurological deficit.  No skin rash.  Lymph nodes are not palpably enlarged in the neck and axillary areas.  No clubbing.   Patient is anxious.  Performance status 1.   No lymphedema.    Historical Information   Past Medical History:   Diagnosis Date    Abnormal Pap smear of cervix 2010    Followed up following year with normal pap smear    Anemia March 28,2022    Due to infusion therapy?    Asthma 2018 2018 to Jan 2021, i was diagnosed with asthma, jan 2021, i did nit have asthma according to the breathing test the asthma doctor performed, i get allergiy shot every 2 weeks for 2 different types of molds    Cancer (HCC)     breast    Diarrhea     occasional    Port-A-Cath in place     R side chest    Varicella 1971    I have not had it since then.     Past Surgical History:   Procedure Laterality Date    BREAST BIOPSY Right 03/29/2022    [facility]    BREAST LUMPECTOMY Right 09/30/2022    Procedure: ML BREAST;  Surgeon: Azalea Motta MD;  Location: [facility];  Service: Surgical Oncology    CATARACT EXTRACTION Left 2019    with lens implant    IR PORT PLACEMENT  04/20/2022    LYMPH NODE BIOPSY Right 09/30/2022    Procedure: SLN BX;  Surgeon: Azalea Motta MD;  Location: [facility];  Service: Surgical Oncology    MANDIBLE SURGERY      jaw surgery for crooked jaw 1993, 1998    ID GRAFTING OF AUTOLOGOUS FAT BY LIPO 50 CC OR LESS Right 3/13/2024    Procedure: FAT GRAFTING TO RIGHT BREAST.;  Surgeon: Zandar" Elizabeth Diamond MD;  [facility];  Service: Plastics    IA MASTOPEXY Left 3/13/2024    Procedure: LEFT MASTOPEXY BREAST FOR SYMMETRY;  Surgeon: Zandra Diamond MD;  [facility];  Service: Plastics    ROTATOR CUFF REPAIR Right 2020    US BREAST CLIP NEEDLE LOC RIGHT Right 03/29/2022    US GUIDED BREAST BIOPSY RIGHT COMPLETE Right 03/29/2022     Social History   Social History     Substance and Sexual Activity   Alcohol Use Not Currently     Social History     Substance and Sexual Activity   Drug Use Never     Social History     Tobacco Use   Smoking Status Never    Passive exposure: Never   Smokeless Tobacco Never     Family History:   Family History   Problem Relation Age of Onset    Prostate cancer Father     Asthma Father         He has had asthma previously    No Known Problems Sister     Asthma Brother         Has had asthma previously    No Known Problems Daughter     No Known Problems Daughter     Lung cancer Maternal Aunt     No Known Problems Paternal Aunt     No Known Problems Paternal Aunt     No Known Problems Maternal Grandmother     No Known Problems Maternal Grandfather     Breast cancer Paternal Grandmother 80        I was told she had breast cancer in her 80’s.    No Known Problems Paternal Grandfather     Stomach cancer Other          Current Outpatient Medications:     albuterol (2.5 mg/3 mL) 0.083 % nebulizer solution, USE 1 VIAL IN NEBULIZER EVERY 4 HOURS AS NEEDED FOR WHEEZING, Disp: , Rfl:     albuterol (PROVENTIL HFA,VENTOLIN HFA) 90 mcg/act inhaler, , Disp: , Rfl:     cetirizine (ZyrTEC) 10 mg tablet, Take 10 mg by mouth if needed, Disp: , Rfl:     denosumab (Prolia) 60 mg/mL, as directed Subcutaneous, Disp: , Rfl:     EPINEPHrine (EPIPEN) 0.3 mg/0.3 mL SOAJ, , Disp: , Rfl:     letrozole (FEMARA) 2.5 mg tablet, Take 1 tablet (2.5 mg total) by mouth daily, Disp: 30 tablet, Rfl: 5    lidocaine-prilocaine (EMLA) cream, Apply topically on the skin over the Port-A-Cath as  advised, Disp: 30 g, Rfl: 1    loperamide (IMODIUM) 2 mg capsule, Take 2 mg by mouth 4 (four) times a day as needed for diarrhea, Disp: , Rfl:     sodium chloride (OCEAN) 0.65 % nasal spray, Saline Nasal Mist, Disp: , Rfl:     docusate sodium (COLACE) 100 mg capsule, Take 1 capsule (100 mg total) by mouth 3 (three) times a day as needed for constipation (Patient not taking: Reported on 7/24/2024), Disp: 40 capsule, Rfl: 1    enoxaparin (Lovenox) 40 mg/0.4 mL, Inject 0.4 mL (40 mg total) under the skin in the morning for 7 days Use right or left thigh. (Patient not taking: Reported on 7/11/2024), Disp: 2.8 mL, Rfl: 0    ondansetron (ZOFRAN-ODT) 4 mg disintegrating tablet, Take 1 tablet (4 mg total) by mouth every 6 (six) hours as needed for nausea or vomiting (Patient not taking: Reported on 7/24/2024), Disp: 20 tablet, Rfl: 0    oxyCODONE (Roxicodone) 5 immediate release tablet, Take 1 tablet (5 mg total) by mouth every 4 (four) hours as needed for moderate pain for up to 12 doses Max Daily Amount: 30 mg (Patient not taking: Reported on 7/24/2024), Disp: 12 tablet, Rfl: 0    senna (SENOKOT) 8.6 mg, Take 2 tablets (17.2 mg total) by mouth daily at bedtime (Patient not taking: Reported on 7/11/2024), Disp: 30 tablet, Rfl: 1    Allergies   Allergen Reactions    Molds & Smuts Hives and Anaphylaxis     Sore throat  Sore throat      Other Other (See Comments), Anaphylaxis and Hives     Mold/gets sore throat    Nuts - Food Allergy Hives     BRAZILIAN NUTS                 Lab Results: I have reviewed all pertinent labs.  LABS:              Results for orders placed or performed during the hospital encounter of 09/26/24   CBC and differential    Collection Time: 09/26/24 12:41 PM   Result Value Ref Range    WBC 5.56 4.31 - 10.16 Thousand/uL    RBC 3.98 3.81 - 5.12 Million/uL    Hemoglobin 12.4 11.5 - 15.4 g/dL    Hematocrit 39.2 34.8 - 46.1 %    MCV 99 (H) 82 - 98 fL    MCH 31.2 26.8 - 34.3 pg    MCHC 31.6 31.4 - 37.4 g/dL     RDW 12.2 11.6 - 15.1 %    MPV 10.0 8.9 - 12.7 fL    Platelets 230 149 - 390 Thousands/uL    nRBC 0 /100 WBCs    Segmented % 64 43 - 75 %    Immature Grans % 1 0 - 2 %    Lymphocytes % 21 14 - 44 %    Monocytes % 7 4 - 12 %    Eosinophils Relative 5 0 - 6 %    Basophils Relative 2 (H) 0 - 1 %    Absolute Neutrophils 3.62 1.85 - 7.62 Thousands/µL    Absolute Immature Grans 0.06 0.00 - 0.20 Thousand/uL    Absolute Lymphocytes 1.14 0.60 - 4.47 Thousands/µL    Absolute Monocytes 0.36 0.17 - 1.22 Thousand/µL    Eosinophils Absolute 0.29 0.00 - 0.61 Thousand/µL    Basophils Absolute 0.09 0.00 - 0.10 Thousands/µL   Comprehensive metabolic panel    Collection Time: 09/26/24 12:41 PM   Result Value Ref Range    Sodium 138 135 - 147 mmol/L    Potassium 4.1 3.5 - 5.3 mmol/L    Chloride 105 96 - 108 mmol/L    CO2 27 21 - 32 mmol/L    ANION GAP 6 4 - 13 mmol/L    BUN 17 5 - 25 mg/dL    Creatinine 0.65 0.60 - 1.30 mg/dL    Glucose 91 65 - 140 mg/dL    Calcium 9.2 8.4 - 10.2 mg/dL    AST 18 13 - 39 U/L    ALT 13 7 - 52 U/L    Alkaline Phosphatase 37 34 - 104 U/L    Total Protein 6.5 6.4 - 8.4 g/dL    Albumin 4.4 3.5 - 5.0 g/dL    Total Bilirubin 0.51 0.20 - 1.00 mg/dL    eGFR 94 ml/min/1.73sq m   Cancer antigen 27.29    Collection Time: 09/26/24 12:41 PM   Result Value Ref Range    CA 27-29 17.9 0.0 - 38.6 U/mL         Imaging Studies: I have personally reviewed pertinent reports.    IMPRESSION:     1. Stable exam. No scintigraphic evidence of osseous metastasis.           Workstation performed: [id number]        Imaging    NM bone scan whole body (Order: [id number]) - 12/21/23    MPRESSION:     No new findings to suggest metastatic disease in the chest, abdomen, or pelvis.  New presumed post radiation treatment change in the anterior right upper lobe.           Workstation performed: [id number]        Imaging    CT chest abdomen pelvis w contrast (Order: [id number]) - 12/6/2023  .      Reviewed above test results and  discussed with patient and her     Assessment and Plan:  See diagnoses, orders instructions below  Patient is here with her .  Oncological follow-up visit for triple positive (ER positive, MO positive and HER2 positive) right breast cancer, T2 NX that was diagnosed in March 2022 . Tumor mass was 2.5 cm  with negative axilla on ultrasound and negative distant metastatic disease.  Path  report was invasive mammary carcinoma, grade 2, ER 70-80% and MO 70-80% and HER2 3+ positive.  Patient received neoadjuvant chemo immunotherapy and she received 6 cycles of TCHP and after that she had lumpectomy and sentinel lymph node sampling  on 09/30/2022 and there was residual 1.8 cm invasive cancer.  Margins were clear. All  8 sentinel lymph nodes were negative for metastatic disease.  Patient received radiation.  Patient had Herceptin plus Perjeta to complete 1 year of therapy and that was completed in April 2023.  Letrozole was started in November 2022 and she will take that for a total of 10 years.  She has osteopenia and she is taking calcium and vitamin D and is  on Prolia. Negative genetic testing for significant variant.  No history of cancers in her family.   Patient is  nervous and anxious now .  Has less tiredness.  She had CT scan and bone scan in December 2023 and imaging studies did not show metastatic disease.   Physical examination and test results are as recorded and discussed in detail.  Letrozole will be continued for a total of 10 years.  She will stay on  calcium with vitamin D and Prolia.  She will have follow-up DEXA scan.  She  is scheduled to have echocardiogram .    Discussed all this with patient in detail.  Questions answered  Patient  has Port-A-Cath and she gets that flushed.  Previously discussed that single drug Kadcyla in the adjuvant setting was found to be better than Herceptin but not tested against Herceptin plus Perjeta and I do not think that will be tested.  Discussed  importance of self-breast examination, eating healthy foods, staying active and health screening test.  Patient is capable of self-care.  Discussed precautions against coronavirus and flu and other infections.  Goal of therapy will be cure if possible   All discussed in very much detail.  Questions answered.  1. Malignant neoplasm of central portion of right breast in female, estrogen receptor positive (HCC)    - Cancer antigen 27.29; Future  - CBC and differential; Future  - Comprehensive metabolic panel; Future  - letrozole (FEMARA) 2.5 mg tablet; Take 1 tablet (2.5 mg total) by mouth daily  Dispense: 30 tablet; Refill: 5  - lidocaine-prilocaine (EMLA) cream; Apply topically on the skin over the Port-A-Cath as advised  Dispense: 30 g; Refill: 1    2. 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 HER2-positive carcinoma of right breast (HCC)      4. Osteoporosis due to aromatase inhibitor    - DXA bone density spine hip and pelvis; Future    5. Port-A-Cath in place    - lidocaine-prilocaine (EMLA) cream; Apply topically on the skin over the Port-A-Cath as advised  Dispense: 30 g; Refill: 1           Patient will continue to follow with her primary physician and other consultants.  Patient voiced understanding and agrees     This note has been generated by voice recognition software.  Therefore there maybe spelling, grammar, and other errors.  Please contact if questions arise.    Counseling / Coordination of Care  ..  Provided counseling and support

## 2024-11-07 ENCOUNTER — HOSPITAL ENCOUNTER (OUTPATIENT)
Dept: INFUSION CENTER | Facility: CLINIC | Age: 64
Discharge: HOME | End: 2024-11-07

## 2024-11-07 DIAGNOSIS — Z95.828 PORT-A-CATH IN PLACE: Primary | ICD-10-CM

## 2024-11-07 NOTE — PROGRESS NOTES
Emily Hernandez  tolerated treatment well with no complications.      Emily Hernandez is aware of future appt on 12/19/24 @ 1200.     AVS declined by Emily Hernandez.

## 2024-11-12 ENCOUNTER — HOSPITAL ENCOUNTER (OUTPATIENT)
Age: 64
Setting detail: OUTPATIENT SURGERY
Discharge: HOME | End: 2024-11-12
Attending: ORTHOPAEDIC SURGERY | Admitting: ORTHOPAEDIC SURGERY
Payer: COMMERCIAL

## 2024-11-12 VITALS
TEMPERATURE: 98.1 F | HEART RATE: 80 BPM | OXYGEN SATURATION: 100 % | SYSTOLIC BLOOD PRESSURE: 131 MMHG | BODY MASS INDEX: 20.66 KG/M2 | HEIGHT: 65 IN | WEIGHT: 124 LBS | DIASTOLIC BLOOD PRESSURE: 64 MMHG | RESPIRATION RATE: 16 BRPM

## 2024-11-12 DIAGNOSIS — M65.311 TRIGGER THUMB OF RIGHT HAND: Primary | ICD-10-CM

## 2024-11-12 PROCEDURE — 26055 INCISE FINGER TENDON SHEATH: CPT | Performed by: PHYSICIAN ASSISTANT

## 2024-11-12 PROCEDURE — 26055 INCISE FINGER TENDON SHEATH: CPT | Performed by: ORTHOPAEDIC SURGERY

## 2024-11-12 PROCEDURE — NC001 PR NO CHARGE: Performed by: ORTHOPAEDIC SURGERY

## 2024-11-12 RX ORDER — SENNOSIDES 8.6 MG
650 CAPSULE ORAL EVERY 8 HOURS PRN
Qty: 30 TABLET | Refills: 0 | Status: SHIPPED | OUTPATIENT
Start: 2024-11-12

## 2024-11-12 RX ORDER — TRAMADOL HYDROCHLORIDE 50 MG/1
50 TABLET ORAL EVERY 6 HOURS PRN
Status: DISCONTINUED | OUTPATIENT
Start: 2024-11-12 | End: 2024-11-12 | Stop reason: HOSPADM

## 2024-11-12 RX ORDER — MAGNESIUM HYDROXIDE 1200 MG/15ML
LIQUID ORAL AS NEEDED
Status: DISCONTINUED | OUTPATIENT
Start: 2024-11-12 | End: 2024-11-12 | Stop reason: HOSPADM

## 2024-11-12 RX ORDER — COVID-19 ANTIGEN TEST
220 KIT MISCELLANEOUS 2 TIMES DAILY
Qty: 60 CAPSULE | Refills: 0 | Status: SHIPPED | OUTPATIENT
Start: 2024-11-12 | End: 2024-12-12

## 2024-11-12 RX ORDER — ACETAMINOPHEN 325 MG/1
650 TABLET ORAL EVERY 6 HOURS PRN
Status: DISCONTINUED | OUTPATIENT
Start: 2024-11-12 | End: 2024-11-12 | Stop reason: HOSPADM

## 2024-11-12 RX ORDER — ONDANSETRON 2 MG/ML
4 INJECTION INTRAMUSCULAR; INTRAVENOUS EVERY 6 HOURS PRN
Status: DISCONTINUED | OUTPATIENT
Start: 2024-11-12 | End: 2024-11-12 | Stop reason: HOSPADM

## 2024-11-12 RX ORDER — TRAMADOL HYDROCHLORIDE 50 MG/1
50 TABLET ORAL EVERY 6 HOURS PRN
Qty: 5 TABLET | Refills: 0 | Status: SHIPPED | OUTPATIENT
Start: 2024-11-12

## 2024-11-12 RX ORDER — LIDOCAINE HYDROCHLORIDE AND EPINEPHRINE 10; 10 MG/ML; UG/ML
20 INJECTION, SOLUTION INFILTRATION; PERINEURAL ONCE
Status: DISCONTINUED | OUTPATIENT
Start: 2024-11-12 | End: 2024-11-12

## 2024-11-12 RX ADMIN — LIDOCAINE HYDROCHLORIDE,EPINEPHRINE BITARTRATE: 10; .01 INJECTION, SOLUTION INFILTRATION; PERINEURAL at 10:40

## 2024-11-12 NOTE — OP NOTE
OPERATIVE REPORT  PATIENT NAME: Emily Hernandez  :  1960  MRN: [id number]  Pt Location: [facility]    SURGERY DATE: 24    Surgeons and Role:     * Arnie Lee MD - Primary     * Moise Magallon PA-C - Assisting    Pre-Op Diagnosis:  Trigger thumb of right hand [M65.311]    Post-Op Diagnosis:  .Trigger thumb of right hand [M65.311]    Procedure(s) (LRB):  Right trigger thumb release (Right)    Specimen(s):  No specimens collected during this procedure.    Estimated Blood Loss:   Minimal      Anesthesia Type:   Local    Operative Indications:  The patient has a history of right trigger thumb that was recalcitrant to conservative management.  The decision was made to bring the patient to the operating room for right trigger thumb release under local.  Risks of the procedure were explained which include, but are not limited to bleeding; infection; damage to nerves, arteries,veins, tendons; scar; pain; need for reoperation; failure to give desired result; and risks of anaesthesia.  All questions were answered to satisfaction and they were willing to proceed.         Operative Findings:  Right trigger thumb    Complications:   None    Procedure and Technique:  After the patient, site, and procedure were identified, the patient was brought into the operating room in a supine position.  Local anaesthesia was adminstered in the preoperative holding area.  A tourniquet was not used.  The  right upper extremity was then prepped and drapped in a normal, sterile, orthopedic fashion.    After the patient, site, and procedure were once again identified, attention was turned to the right thumb.  An incision was made over the flexor tendon sheath at the level of the A1 pulley.  Dissection was carried out in-line with the flexor tendon sheath and the radial and ulnar digital artery and nerve were protected.  The A1 pulley was identified at the base of the incision.  Under direct visualization, the A1 pulley was  divided along the midline in its entirety with care taken to avoid injury to the underlying tendon.  The tendons were examined to ensure that no further catching, popping, clicking or locking occurred with motion of the finger.       At the completion of the procedure, hemostasis was obtained with cautery and direct pressure.  The wounds were copiously irrigated with sterile solution.  The wounds were closed with Prolene.  Sterile dressings were applied, including Xeroform, gauze, tweeners, webril, ACE.  Please note, all sponge, needle, and instrument counts were correct prior to closure.  Loupe magnification was utilized.  The patient tolerated the procedure well.     I was present for all critical portions of the procedure., A qualified resident physician was not available., and A physician assistant was required during the procedure for retraction, tissue handling, dissection and suturing.    Patient Disposition:  PACU  and hemodynamically stable    SIGNATURE: Arnie Lee MD  DATE: 11/12/24  TIME: 11:04 AM

## 2024-11-12 NOTE — H&P
Diagnosis: Right trigger thumb    Procedure: Right trigger thumb release under local anesthesia

## 2024-11-17 DIAGNOSIS — M81.8 OSTEOPOROSIS DUE TO AROMATASE INHIBITOR: Primary | ICD-10-CM

## 2024-11-17 DIAGNOSIS — T38.6X5A OSTEOPOROSIS DUE TO AROMATASE INHIBITOR: Primary | ICD-10-CM

## 2024-11-20 DIAGNOSIS — T38.6X5A OSTEOPOROSIS DUE TO AROMATASE INHIBITOR: Primary | ICD-10-CM

## 2024-11-20 DIAGNOSIS — M81.8 OSTEOPOROSIS DUE TO AROMATASE INHIBITOR: Primary | ICD-10-CM

## 2024-11-21 ENCOUNTER — TELEPHONE (OUTPATIENT)
Age: 64
End: 2024-11-21

## 2024-11-21 NOTE — TELEPHONE ENCOUNTER
Lvm for pt regarding appt on 12/13 with Dr Diamond in [place]. Informed pt that Dr Diamond is no longer with our practice and we will need to reschedule her. Advised pt that I rescheduled her to be seen on 12/10 with JASMINA Aquino in [place] at 1130 so spots don't fill up too quickly with the changes. Advised pt to callback to r/s or cancel this appt if needed.

## 2024-11-22 ENCOUNTER — OFFICE VISIT (OUTPATIENT)
Dept: OBGYN CLINIC | Facility: HOSPITAL | Age: 64
End: 2024-11-22

## 2024-11-22 VITALS — HEIGHT: 65 IN | BODY MASS INDEX: 20.66 KG/M2 | WEIGHT: 124 LBS

## 2024-11-22 DIAGNOSIS — M65.311 TRIGGER THUMB OF RIGHT HAND: Primary | ICD-10-CM

## 2024-11-22 PROCEDURE — 99024 POSTOP FOLLOW-UP VISIT: CPT | Performed by: PHYSICIAN ASSISTANT

## 2024-11-22 RX ORDER — FLUTICASONE PROPIONATE 50 MCG
1 SPRAY, SUSPENSION (ML) NASAL DAILY
COMMUNITY

## 2024-11-22 NOTE — PROGRESS NOTES
"Assessment:   S/P Right trigger thumb release - Right on 11/12/2024    Plan:   Patient was advised to avoid soaking for 2 to 3 days to allow the incision to finish healing  Patient was advised that they can have palmar pain for 3 to 4 months after surgery which is normal  They was advised to use heat massage as demonstrated in the office  They will follow-up with us as needed    Follow Up:  PRN    To Do Next Visit:  Reevaluation      CHIEF COMPLAINT:  Chief Complaint   Patient presents with    Right Thumb - Post-op, Suture / Staple Removal     TFR-          SUBJECTIVE:  Emily Hernandez is a 64 y.o. female who presents for follow up after Right trigger thumb release - Right on 11/12/2024.  Today patient has no clicking and locking noted.       PHYSICAL EXAMINATION:  Vital signs: Ht 5' 5\" (1.651 m)   Wt 56.2 kg (124 lb)   BMI 20.63 kg/m²   General: well developed and well nourished, alert, oriented times 3, and appears comfortable  Psychiatric: Normal    MUSCULOSKELETAL EXAMINATION:  Incision: Clean, dry, intact  Range of Motion: As expected, opposition intact, full composite fist possible, and no clicking and locking  Neurovascular status: Neuro intact, good cap refill  Activity Restrictions: No restrictions  Done today: Sutures out      STUDIES REVIEWED:  No Studies to review      PROCEDURES PERFORMED:  Procedures  No Procedures performed today    "

## 2024-12-10 ENCOUNTER — OFFICE VISIT (OUTPATIENT)
Dept: PLASTIC SURGERY | Facility: CLINIC | Age: 64
End: 2024-12-10
Payer: COMMERCIAL

## 2024-12-10 DIAGNOSIS — Z85.3 HISTORY OF RIGHT BREAST CANCER: ICD-10-CM

## 2024-12-10 DIAGNOSIS — Z92.3 HISTORY OF RADIATION THERAPY: ICD-10-CM

## 2024-12-10 DIAGNOSIS — N64.89 BREAST ASYMMETRY IN FEMALE: Primary | ICD-10-CM

## 2024-12-10 PROCEDURE — 99213 OFFICE O/P EST LOW 20 MIN: CPT

## 2024-12-16 ENCOUNTER — HOSPITAL ENCOUNTER (OUTPATIENT)
Dept: BONE DENSITY | Facility: CLINIC | Age: 64
Discharge: HOME | End: 2024-12-16
Payer: COMMERCIAL

## 2024-12-16 DIAGNOSIS — T38.6X5A OSTEOPOROSIS DUE TO AROMATASE INHIBITOR: ICD-10-CM

## 2024-12-16 DIAGNOSIS — M81.8 OSTEOPOROSIS DUE TO AROMATASE INHIBITOR: ICD-10-CM

## 2024-12-16 PROCEDURE — 77080 DXA BONE DENSITY AXIAL: CPT

## 2024-12-16 NOTE — PROGRESS NOTES
[facility]  [address], [address], [place] [address]  [phone]    Patient Identification: Emily Hernandez is a 64 y.o. female     History of Present Illness: The patient is a 64 y.o.  year-old female  who presents to the office for post-op visit. Patient is 28 days s/p Right trigger thumb release - Right  on 11/12/2024 by Dr. Diamond.  Patient is accompanied by  at today's visit.  Applying scar cream daily. She is overall satisfied with the appearance of her breasts and feels they are symmetrical in shape and size. States she currently has other health concerns involving her bladder, is seeing Urogyn specialist.  Patient has no complaints at this time.    Past Medical History:   Diagnosis Date    Abnormal Pap smear of cervix 2010    Followed up following year with normal pap smear    Anemia March 28,2022    Due to infusion therapy?    Asthma 2018 2018 to Jan 2021, i was diagnosed with asthma, jan 2021, i did nit have asthma according to the breathing test the asthma doctor performed, i get allergiy shot every 2 weeks for 2 different types of molds    Cancer (HCC)     breast    Diarrhea     occasional    Port-A-Cath in place     R side chest    Varicella 1971    I have not had it since then.          Review of Systems  Constitutional: Denies fevers, chills or pain.  Skin: Denies any warmth, erythema, edema or mucopurulent drainage. Bruising    Physical Exam    Breast: Surgical incisions are healing well. No hypertrophy or hyperpigmentation. Nipple perfusion is intact. There are no signs of infection, obvious hematoma, seroma or wound dehiscence. Breasts symmetric in shape and size.      Assessment and Plan:  The patient is an 64 y.o.  year-old female who presents to the office for post-op edema. Patient is 28 days s/p Right trigger thumb release - Right  on 11/12/2024 by Dr. Diamond    -Breast incision sites healing well. Breasts symmetrical in size and  shape  -Apply Aquaphor to incision sites daily. Discussed daily scar massage and use of silicone scar gel/tape to promote scar softening and flattening. The patient was educated on scar care and that it can take up to 1 year for full scar maturation.  -Patient is happy with her breasts, does not wish to pursue any further surgical intervention.  -Continue f/u with Surg Onc as recommended.  -The patient may return in 1 year for a yearly breast exam or as needed at this time.  -The patient is to call the office with any questions or concerns. All of the patient's questions were answered at this time and they agree with the plan of care.      Joan Velazco PA-C  [facility]

## 2024-12-19 ENCOUNTER — HOSPITAL ENCOUNTER (OUTPATIENT)
Dept: INFUSION CENTER | Facility: CLINIC | Age: 64
Discharge: HOME | End: 2024-12-19
Payer: COMMERCIAL

## 2024-12-19 DIAGNOSIS — Z95.828 PORT-A-CATH IN PLACE: Primary | ICD-10-CM

## 2024-12-19 PROCEDURE — 96523 IRRIG DRUG DELIVERY DEVICE: CPT

## 2024-12-19 NOTE — PROGRESS NOTES
Pt here for port flush only.  Pt states Dr. Jason told her to have her labs done in January before her prolia shot and that today she just needs her port flushed.  Port flushed per protocol.  Pt declined AVS but is aware of her appt 1/22/ at 12:00

## 2025-01-16 ENCOUNTER — OFFICE VISIT (OUTPATIENT)
Dept: SURGICAL ONCOLOGY | Facility: CLINIC | Age: 65
End: 2025-01-16
Payer: COMMERCIAL

## 2025-01-16 VITALS
OXYGEN SATURATION: 98 % | HEART RATE: 92 BPM | HEIGHT: 65 IN | RESPIRATION RATE: 14 BRPM | DIASTOLIC BLOOD PRESSURE: 82 MMHG | SYSTOLIC BLOOD PRESSURE: 126 MMHG | TEMPERATURE: 98.4 F | BODY MASS INDEX: 21.19 KG/M2 | WEIGHT: 127.2 LBS

## 2025-01-16 DIAGNOSIS — Z17.0 MALIGNANT NEOPLASM OF CENTRAL PORTION OF RIGHT BREAST IN FEMALE, ESTROGEN RECEPTOR POSITIVE (HCC): Primary | ICD-10-CM

## 2025-01-16 DIAGNOSIS — Z79.811 USE OF LETROZOLE (FEMARA): ICD-10-CM

## 2025-01-16 DIAGNOSIS — C50.111 MALIGNANT NEOPLASM OF CENTRAL PORTION OF RIGHT BREAST IN FEMALE, ESTROGEN RECEPTOR POSITIVE (HCC): Primary | ICD-10-CM

## 2025-01-16 PROCEDURE — 99214 OFFICE O/P EST MOD 30 MIN: CPT | Performed by: SURGERY

## 2025-01-16 NOTE — PROGRESS NOTES
Surgical Oncology Follow Up       [address]  [facility] SURGICAL ONCOLOGY [place]  [address]  [facility] [address]    Emily Hernandez  1960  [id number]  [address] [facility] SURGICAL ONCOLOGY [place]  [address]  [facility] [address]    Chief Complaint   Patient presents with    Follow-up       Assessment & Plan   Diagnoses and all orders for this visit:    Malignant neoplasm of central portion of right breast in female, estrogen receptor positive (HCC)    Use of letrozole (Femara)        Advance Care Planning/Advance Directives:  Discussed disease status, cancer treatment plans and/or cancer treatment goals with the patient.     Oncology History:    Oncology History   Malignant neoplasm of central portion of right breast in female, estrogen receptor positive (HCC)   3/29/2022 Biopsy    Right breast biopsy:  - Invasive ductal carcinoma  Grade 2  ER 70%   MI 70%  HER2 positive     4/6/2022 -  Cancer Staged    Staging form: Breast, AJCC 8th Edition  - Clinical stage from 4/6/2022: Stage IB (cT2, cN0, cM0, G2, ER+, MI+, HER2+) - Signed by Azalea Motta MD on 4/6/2022  Stage prefix: Initial diagnosis  Method of lymph node assessment: Clinical  Histologic grading system: 3 grade system       5/4/2022 -  Chemotherapy    Pegfilgrastim-bmez (ZIEXTENZO), 6 mg, Subcutaneous, Once, 6 of 6 cycles  Administration: 6 mg (5/5/2022), 6 mg (5/26/2022), 6 mg (6/16/2022), 6 mg (7/7/2022), 6 mg (7/28/2022), 6 mg (8/18/2022)  fosaprepitant (EMEND) IVPB, 150 mg, Intravenous, Once, 6 of 6 cycles  Administration: 150 mg (5/4/2022), 150 mg (5/25/2022), 150 mg (6/15/2022), 150 mg (7/6/2022), 150 mg (7/27/2022), 150 mg (8/17/2022)  pertuzumab (PERJETA) IVPB, 840 mg, Intravenous, Once, 18 of 18 cycles  Administration: 840 mg (5/4/2022), 420 mg (5/25/2022), 420 mg (9/7/2022), 420 mg (6/15/2022), 420 mg (7/6/2022), 420 mg (7/27/2022), 420 mg (8/17/2022), 420 mg (9/28/2022), 420 mg  (10/19/2022), 420 mg (11/9/2022), 420 mg (11/30/2022), 420 mg (12/21/2022), 420 mg (1/11/2023), 420 mg (2/1/2023), 420 mg (2/22/2023), 420 mg (3/15/2023), 420 mg (4/5/2023), 420 mg (4/26/2023)  CARBOplatin (PARAPLATIN) IVPB ([facility] AUC DOSING), 588.6 mg, Intravenous, Once, 6 of 6 cycles  Administration: 588.6 mg (5/4/2022), 588.6 mg (5/25/2022), 588.6 mg (6/15/2022), 582.6 mg (7/6/2022), 588.6 mg (7/27/2022), 582.6 mg (8/17/2022)  trastuzumab-qyyp (TRAZIMERA) chemo infusion, 6 mg/kg = 330 mg (100 % of original dose 6 mg/kg), Intravenous, Once, 11 of 11 cycles  Dose modification: 6 mg/kg (original dose 6 mg/kg, Cycle 8), 6 mg/kg (original dose 6 mg/kg, Cycle 9), 6 mg/kg (original dose 6 mg/kg, Cycle 12)  Administration: 330 mg (9/28/2022), 330 mg (10/19/2022), 330 mg (11/9/2022), 361 mg (11/30/2022), 361 mg (12/21/2022), 361 mg (1/11/2023), 361 mg (2/1/2023), 361 mg (2/22/2023), 361 mg (3/15/2023), 361 mg (4/5/2023), 361 mg (4/26/2023)  DOCEtaxel (TAXOTERE) chemo infusion, 75 mg/m2 = 120 mg, Intravenous, Once, 6 of 6 cycles  Administration: 120 mg (5/4/2022), 120 mg (5/25/2022), 120 mg (6/15/2022), 120 mg (7/6/2022), 120 mg (7/27/2022), 120 mg (8/17/2022)  trastuzumab (HERCEPTIN) chemo infusion, 439 mg, Intravenous, Once, 7 of 7 cycles  Administration: 450 mg (5/4/2022), 330 mg (5/25/2022), 330 mg (9/7/2022), 330 mg (6/15/2022), 330 mg (7/6/2022), 330 mg (7/27/2022), 330 mg (8/17/2022)     8/2022 Genetic Testing    [facility] BRCAplus STAT Panel (8 genes): ASHU, BRCA1, BRCA2, CDH1, CHEK2, PALB2, PTEN, TP53 with reflex to [facility] CancerNext + RNA (28 additional genes): APC, AXIN2 BARD1, BRIP1, BMPR1A, CDK4, CDKN2A, DICER1, EPCAM, GREM1, HOXB13, MLH1, MSH2, MSH3, MSH6, MUTYH, NBN, NF1, NTHL1, PMS2, POLD1, POLE, RAD51C, RAD51D, RECQL SMAD4, SMARCA4, STK11    Negative     9/30/2022 Surgery    Right lumpectomy with sentinel lymph node biopsy:  - clear margins  - 0/8 lymph nodes    Dr. Motta     10/18/2022 -  Cancer Staged    Staging  form: Breast, AJCC 8th Edition  - Pathologic stage from 10/18/2022: No Stage Recommended (ypT1c, pN0(sn), cM0, G2, ER+, KS+, HER2+) - Signed by Azalea Motta MD on 10/18/2022  Stage prefix: Post-therapy  Method of lymph node assessment: [place] lymph node biopsy  Histologic grading system: 3 grade system       11/11/2022 - 12/9/2022 Radiation    Plan ID Energy Fractions Dose per Fraction (cGy) Dose Correction (cGy) Total Dose Delivered (cGy) Elapsed Days   R Breast 6X 16 / 16 266 0 4,256 24   R Brst Boost 12E 4 / 4 250 0 1,000 3   Dr. Henry     11/2022 -  Hormone Therapy    Letrozole   Dr. Jason         History of Present Illness: Breast cancer follow-up, denies any current breast referable concerns, other issues as noted in ROS, states that she is tolerating her letrozole  -Interval History: As noted    Review of Systems:  Review of Systems   Constitutional: Negative.  Negative for appetite change, fever and unexpected weight change.   HENT: Negative.  Negative for trouble swallowing.    Eyes: Negative.    Respiratory: Negative.  Negative for cough and shortness of breath.    Cardiovascular: Negative.  Negative for chest pain.   Gastrointestinal: Negative.  Negative for abdominal pain, nausea and vomiting.   Endocrine: Negative.    Genitourinary:  Negative for dysuria.        Bladder prolapse, having upcoming surgery at [facility]   Musculoskeletal: Negative.  Negative for arthralgias and myalgias.   Skin: Negative.    Allergic/Immunologic: Negative.    Neurological: Negative.  Negative for headaches.   Hematological: Negative.  Negative for adenopathy. Does not bruise/bleed easily.   Psychiatric/Behavioral: Negative.         Patient Active Problem List   Diagnosis    Malignant neoplasm of central portion of right breast in female, estrogen receptor positive (HCC)    Port-A-Cath in place    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HCC)    Osteoporosis due to aromatase inhibitor    Rib pain    Use of letrozole (Femara)     S/P breast reconstruction, bilateral    Carpal tunnel syndrome on right     Past Medical History:   Diagnosis Date    Abnormal Pap smear of cervix 2010    Followed up following year with normal pap smear    Anemia March 28,2022    Due to infusion therapy?    Asthma 2018 2018 to Jan 2021, i was diagnosed with asthma, jan 2021, i did nit have asthma according to the breathing test the asthma doctor performed, i get allergiy shot every 2 weeks for 2 different types of molds    Diarrhea     occasional    Port-A-Cath in place     R side chest    Varicella 1971    I have not had it since then.     Past Surgical History:   Procedure Laterality Date    BREAST BIOPSY Right 03/29/2022    [facility]    BREAST LUMPECTOMY Right 09/30/2022    Procedure: ML BREAST;  Surgeon: Azalea Motta MD;  [facility]: [facility];  Service: Surgical Oncology    CATARACT EXTRACTION Left 2019    with lens implant    IR PORT PLACEMENT  04/20/2022    LYMPH NODE BIOPSY Right 09/30/2022    Procedure: SLN BX;  Surgeon: Azalea Motta MD;  Location: [facility];  Service: Surgical Oncology    MANDIBLE SURGERY      jaw surgery for crooked jaw 1993, 1998    IL GRAFTING OF AUTOLOGOUS FAT BY LIPO 50 CC OR LESS Right 3/13/2024    Procedure: FAT GRAFTING TO RIGHT BREAST.;  Surgeon: Zandra Diamond MD;  [facility];  Service: Plastics    IL MASTOPEXY Left 3/13/2024    Procedure: LEFT MASTOPEXY BREAST FOR SYMMETRY;  Surgeon: Zandra Diamond MD;  [facility];  Service: Plastics    IL TENDON SHEATH INCISION Right 11/12/2024    Procedure: Right trigger thumb release;  Surgeon: Arnie Lee MD;  [facility];  Service: Orthopedics    ROTATOR CUFF REPAIR Right 2020    US BREAST CLIP NEEDLE LOC RIGHT Right 03/29/2022    US GUIDED BREAST BIOPSY RIGHT COMPLETE Right 03/29/2022     Family History   Problem Relation Age of Onset    Prostate cancer Father     Asthma Father         He has had asthma previously    No Known Problems  Sister     Asthma Brother         Has had asthma previously    No Known Problems Daughter     No Known Problems Daughter     Lung cancer Maternal Aunt     No Known Problems Paternal Aunt     No Known Problems Paternal Aunt     No Known Problems Maternal Grandmother     No Known Problems Maternal Grandfather     Breast cancer Paternal Grandmother 80        I was told she had breast cancer in her 80’s.    No Known Problems Paternal Grandfather     Stomach cancer Other      Social History     Socioeconomic History    Marital status: /Civil Union     Spouse name: Not on file    Number of children: Not on file    Years of education: Not on file    Highest education level: Not on file   Occupational History    Not on file   Tobacco Use    Smoking status: Never     Passive exposure: Never    Smokeless tobacco: Never   Vaping Use    Vaping status: Never Used   Substance and Sexual Activity    Alcohol use: Not Currently    Drug use: Never    Sexual activity: Not Currently     Partners: Male     Birth control/protection: Condom Male   Other Topics Concern    Not on file   Social History Narrative    Not on file     Social Drivers of Health     Financial Resource Strain: Low Risk  (2/4/2024)    [facility]    Overall Financial Resource Strain (CARDIA)     Difficulty of Paying Living Expenses: Not very hard   Food Insecurity: No Food Insecurity (2/4/2024)    [facility]    Hunger Vital Sign     Worried About Running Out of Food in the Last Year: Never true     Ran Out of Food in the Last Year: Never true   Transportation Needs: No Transportation Needs (2/4/2024)    [facility]    PRAPARE - Transportation     Lack of Transportation (Medical): No     Lack of Transportation (Non-Medical): No   Physical Activity: Not on file   Stress: Stress  Concern Present (2/4/2024)    [facility]    [employer] - Occupational Stress Questionnaire     Feeling of Stress : To some extent   Social Connections: Feeling Socially Integrated (2/4/2024)    [facility]    OASIS : Social Isolation     How often do you feel lonely or isolated from those around you?: Rarely   Intimate Partner Violence: Not At Risk (2/4/2024)    [facility]    Humiliation, Afraid, Rape, and Kick questionnaire     Fear of Current or Ex-Partner: No     Emotionally Abused: No     Physically Abused: No     Sexually Abused: No   Housing Stability: Low Risk  (2/4/2024)    [facility]    Housing Stability Vital Sign     Unable to Pay for Housing in the Last Year: No     Number of Places Lived in the Last Year: 1     Unstable Housing in the Last Year: No       Current Outpatient Medications:     albuterol (2.5 mg/3 mL) 0.083 % nebulizer solution, USE 1 VIAL IN NEBULIZER EVERY 4 HOURS AS NEEDED FOR WHEEZING, Disp: , Rfl:     albuterol (PROVENTIL HFA,VENTOLIN HFA) 90 mcg/act inhaler, , Disp: , Rfl:     Calcium Acetate, Phos Binder, (CALCIUM ACETATE PO), Take by mouth, Disp: , Rfl:     cetirizine (ZyrTEC) 10 mg tablet, Take 10 mg by mouth if needed, Disp: , Rfl:     denosumab (Prolia) 60 mg/mL, as directed Subcutaneous, Disp: , Rfl:     EPINEPHrine (EPIPEN) 0.3 mg/0.3 mL SOAJ, , Disp: , Rfl:     fluticasone (FLONASE) 50 mcg/act nasal spray, 1 spray into each nostril daily, Disp: , Rfl:     letrozole (FEMARA) 2.5 mg tablet, Take 1 tablet (2.5 mg total) by mouth daily, Disp: 30 tablet, Rfl: 5    lidocaine-prilocaine (EMLA) cream, Apply topically on the skin over the Port-A-Cath as advised, Disp: 30 g, Rfl: 1    loperamide (IMODIUM) 2 mg  capsule, Take 2 mg by mouth 4 (four) times a day as needed for diarrhea, Disp: , Rfl:     sodium chloride (OCEAN) 0.65 % nasal spray, Saline Nasal Mist, Disp: , Rfl:     VITAMIN D, CHOLECALCIFEROL, PO, Take by mouth, Disp: , Rfl:     acetaminophen (TYLENOL) 650 mg CR tablet, Take 1 tablet (650 mg total) by mouth every 8 (eight) hours as needed for mild pain, Disp: 30 tablet, Rfl: 0    docusate sodium (COLACE) 100 mg capsule, Take 1 capsule (100 mg total) by mouth 3 (three) times a day as needed for constipation (Patient not taking: Reported on 7/24/2024), Disp: 40 capsule, Rfl: 1    enoxaparin (Lovenox) 40 mg/0.4 mL, Inject 0.4 mL (40 mg total) under the skin in the morning for 7 days Use right or left thigh. (Patient not taking: Reported on 7/11/2024), Disp: 2.8 mL, Rfl: 0    Naproxen Sodium 220 MG CAPS, Take 1 capsule (220 mg total) by mouth 2 (two) times a day (Patient not taking: Reported on 11/22/2024), Disp: 60 capsule, Rfl: 0    ondansetron (ZOFRAN-ODT) 4 mg disintegrating tablet, Take 1 tablet (4 mg total) by mouth every 6 (six) hours as needed for nausea or vomiting (Patient not taking: Reported on 7/24/2024), Disp: 20 tablet, Rfl: 0    oxyCODONE (Roxicodone) 5 immediate release tablet, Take 1 tablet (5 mg total) by mouth every 4 (four) hours as needed for moderate pain for up to 12 doses Max Daily Amount: 30 mg (Patient not taking: Reported on 7/24/2024), Disp: 12 tablet, Rfl: 0    senna (SENOKOT) 8.6 mg, Take 2 tablets (17.2 mg total) by mouth daily at bedtime (Patient not taking: Reported on 7/11/2024), Disp: 30 tablet, Rfl: 1    traMADol (Ultram) 50 mg tablet, Take 1 tablet (50 mg total) by mouth every 6 (six) hours as needed for moderate pain (Patient not taking: Reported on 11/22/2024), Disp: 5 tablet, Rfl: 0  Allergies   Allergen Reactions    Molds & Smuts Hives and Anaphylaxis     Sore throat  Sore throat      Other Other (See Comments), Anaphylaxis and Hives     Mold/gets sore throat    Nuts - Food  Allergy Hives     [place] NUTS           The following portions of the patient's history were reviewed and updated as appropriate: allergies, current medications, past family history, past medical history, past social history, past surgical history, and problem list.        Vitals:    01/16/25 0821   BP: 126/82   Pulse: 92   Resp: 14   Temp: 98.4 °F (36.9 °C)   SpO2: 98%       Physical Exam  Constitutional:       General: She is not in acute distress.     Appearance: Normal appearance. She is well-developed.   HENT:      Head: Normocephalic and atraumatic.   Cardiovascular:      Heart sounds: Normal heart sounds.   Pulmonary:      Breath sounds: Normal breath sounds.   Chest:   Breasts:     Right: Skin change (Lumpectomy scar) present. No swelling, bleeding, inverted nipple, mass, nipple discharge or tenderness.      Left: No swelling, bleeding, inverted nipple, mass, nipple discharge, skin change or tenderness.      Comments: Port right infraclavicular  Abdominal:      Palpations: Abdomen is soft.   Musculoskeletal:      Right lower leg: No edema.      Left lower leg: No edema.   Lymphadenopathy:      Upper Body:      Right upper body: No supraclavicular, axillary or pectoral adenopathy.      Left upper body: No supraclavicular, axillary or pectoral adenopathy.   Neurological:      Mental Status: She is alert and oriented to person, place, and time.   Psychiatric:         Mood and Affect: Mood normal.           Results:    2/23/2024 bilateral 3D diagnostic mammogram was benign, density was a 3    2/23/2024 automated breast ultrasound was also benign        I reviewed the above imaging data.    Discussion/Summary: 64-year-old female status post neoadjuvant chemo and anti-HER2 therapy for a clinical T2 N0 invasive duct carcinoma.  She was also hormone positive and is currently on letrozole.  She had breast conservation including radiation therapy.  She continues to have her port in place and would like to maintain  this.  There is no evidence of disease based on exam today.  Her mammogram last year was benign as was the automated breast ultrasound.  She is scheduled for imaging in 1 month.  We will see her again in 6 months for another survivorship visit or sooner should the need arise.

## 2025-01-22 ENCOUNTER — HOSPITAL ENCOUNTER (OUTPATIENT)
Dept: INFUSION CENTER | Facility: CLINIC | Age: 65
Discharge: HOME | End: 2025-01-22
Payer: COMMERCIAL

## 2025-01-22 DIAGNOSIS — C50.111 MALIGNANT NEOPLASM OF CENTRAL PORTION OF RIGHT BREAST IN FEMALE, ESTROGEN RECEPTOR POSITIVE (HCC): ICD-10-CM

## 2025-01-22 DIAGNOSIS — Z95.828 PORT-A-CATH IN PLACE: Primary | ICD-10-CM

## 2025-01-22 DIAGNOSIS — Z17.0 MALIGNANT NEOPLASM OF CENTRAL PORTION OF RIGHT BREAST IN FEMALE, ESTROGEN RECEPTOR POSITIVE (HCC): ICD-10-CM

## 2025-01-22 LAB
ALBUMIN SERPL BCG-MCNC: 4.4 G/DL (ref 3.5–5)
ALP SERPL-CCNC: 32 U/L (ref 34–104)
ALT SERPL W P-5'-P-CCNC: 12 U/L (ref 7–52)
ANION GAP SERPL CALCULATED.3IONS-SCNC: 7 MMOL/L (ref 4–13)
AST SERPL W P-5'-P-CCNC: 16 U/L (ref 13–39)
BASOPHILS # BLD AUTO: 0.07 THOUSANDS/ΜL (ref 0–0.1)
BASOPHILS NFR BLD AUTO: 2 % (ref 0–1)
BILIRUB SERPL-MCNC: 0.52 MG/DL (ref 0.2–1)
BUN SERPL-MCNC: 15 MG/DL (ref 5–25)
CALCIUM SERPL-MCNC: 9.7 MG/DL (ref 8.4–10.2)
CHLORIDE SERPL-SCNC: 105 MMOL/L (ref 96–108)
CO2 SERPL-SCNC: 27 MMOL/L (ref 21–32)
CREAT SERPL-MCNC: 0.63 MG/DL (ref 0.6–1.3)
EOSINOPHIL # BLD AUTO: 0.32 THOUSAND/ΜL (ref 0–0.61)
EOSINOPHIL NFR BLD AUTO: 7 % (ref 0–6)
ERYTHROCYTE [DISTWIDTH] IN BLOOD BY AUTOMATED COUNT: 12.1 % (ref 11.6–15.1)
GFR SERPL CREATININE-BSD FRML MDRD: 95 ML/MIN/1.73SQ M
GLUCOSE SERPL-MCNC: 86 MG/DL (ref 65–140)
HCT VFR BLD AUTO: 38.5 % (ref 34.8–46.1)
HGB BLD-MCNC: 12.5 G/DL (ref 11.5–15.4)
IMM GRANULOCYTES # BLD AUTO: 0.01 THOUSAND/UL (ref 0–0.2)
IMM GRANULOCYTES NFR BLD AUTO: 0 % (ref 0–2)
LYMPHOCYTES # BLD AUTO: 1.16 THOUSANDS/ΜL (ref 0.6–4.47)
LYMPHOCYTES NFR BLD AUTO: 25 % (ref 14–44)
MCH RBC QN AUTO: 30.9 PG (ref 26.8–34.3)
MCHC RBC AUTO-ENTMCNC: 32.5 G/DL (ref 31.4–37.4)
MCV RBC AUTO: 95 FL (ref 82–98)
MONOCYTES # BLD AUTO: 0.41 THOUSAND/ΜL (ref 0.17–1.22)
MONOCYTES NFR BLD AUTO: 9 % (ref 4–12)
NEUTROPHILS # BLD AUTO: 2.74 THOUSANDS/ΜL (ref 1.85–7.62)
NEUTS SEG NFR BLD AUTO: 57 % (ref 43–75)
NRBC BLD AUTO-RTO: 0 /100 WBCS
PLATELET # BLD AUTO: 244 THOUSANDS/UL (ref 149–390)
PMV BLD AUTO: 9.6 FL (ref 8.9–12.7)
POTASSIUM SERPL-SCNC: 4.3 MMOL/L (ref 3.5–5.3)
PROT SERPL-MCNC: 6.6 G/DL (ref 6.4–8.4)
RBC # BLD AUTO: 4.05 MILLION/UL (ref 3.81–5.12)
SODIUM SERPL-SCNC: 139 MMOL/L (ref 135–147)
WBC # BLD AUTO: 4.71 THOUSAND/UL (ref 4.31–10.16)

## 2025-01-22 PROCEDURE — 85025 COMPLETE CBC W/AUTO DIFF WBC: CPT

## 2025-01-22 PROCEDURE — 80053 COMPREHEN METABOLIC PANEL: CPT

## 2025-01-22 PROCEDURE — 86300 IMMUNOASSAY TUMOR CA 15-3: CPT

## 2025-01-22 NOTE — PROGRESS NOTES
Emily Hernandez  tolerated central labs well with no complications.      Emily Hernandez is aware of future appt on Thursday Jan 30, 2025 12:00 PM.     AVS declined by Emily Hernandez.

## 2025-01-23 LAB — CANCER AG27-29 SERPL-ACNC: 12.1 U/ML (ref 0–38.6)

## 2025-01-28 ENCOUNTER — HOSPITAL ENCOUNTER (OUTPATIENT)
Dept: NON INVASIVE DIAGNOSTICS | Facility: CLINIC | Age: 65
Discharge: HOME | End: 2025-01-28
Payer: COMMERCIAL

## 2025-01-28 VITALS
BODY MASS INDEX: 21.16 KG/M2 | SYSTOLIC BLOOD PRESSURE: 126 MMHG | WEIGHT: 127 LBS | HEIGHT: 65 IN | HEART RATE: 90 BPM | DIASTOLIC BLOOD PRESSURE: 82 MMHG

## 2025-01-28 DIAGNOSIS — C50.111 MALIGNANT NEOPLASM OF CENTRAL PORTION OF RIGHT BREAST IN FEMALE, ESTROGEN RECEPTOR POSITIVE (HCC): ICD-10-CM

## 2025-01-28 DIAGNOSIS — Z17.0 MALIGNANT NEOPLASM OF CENTRAL PORTION OF RIGHT BREAST IN FEMALE, ESTROGEN RECEPTOR POSITIVE (HCC): ICD-10-CM

## 2025-01-28 DIAGNOSIS — I42.7 CHEMOTHERAPY INDUCED CARDIOMYOPATHY (HCC): ICD-10-CM

## 2025-01-28 DIAGNOSIS — T45.1X5A CHEMOTHERAPY INDUCED CARDIOMYOPATHY (HCC): ICD-10-CM

## 2025-01-28 LAB
AORTIC ROOT: 3.2 CM
ASCENDING AORTA: 3 CM
BSA FOR ECHO PROCEDURE: 1.63 M2
E WAVE DECELERATION TIME: 164 MS
E/A RATIO: 1
FRACTIONAL SHORTENING: 35 (ref 28–44)
INTERVENTRICULAR SEPTUM IN DIASTOLE (PARASTERNAL SHORT AXIS VIEW): 0.8 CM
INTERVENTRICULAR SEPTUM: 0.8 CM (ref 0.6–1.1)
LAAS-AP2: 10.4 CM2
LAAS-AP4: 12.4 CM2
LEFT ATRIUM SIZE: 2.5 CM
LEFT ATRIUM VOLUME (MOD BIPLANE): 31 ML
LEFT ATRIUM VOLUME INDEX (MOD BIPLANE): 19 ML/M2
LEFT INTERNAL DIMENSION IN SYSTOLE: 2.6 CM (ref 2.1–4)
LEFT VENTRICLE DIASTOLIC VOLUME (MOD BIPLANE): 119 ML
LEFT VENTRICLE DIASTOLIC VOLUME INDEX (MOD BIPLANE): 73 ML/M2
LEFT VENTRICLE SYSTOLIC VOLUME (MOD BIPLANE): 44 ML
LEFT VENTRICLE SYSTOLIC VOLUME INDEX (MOD BIPLANE): 27 ML/M2
LEFT VENTRICULAR INTERNAL DIMENSION IN DIASTOLE: 4 CM (ref 3.5–6)
LEFT VENTRICULAR POSTERIOR WALL IN END DIASTOLE: 0.8 CM
LEFT VENTRICULAR STROKE VOLUME: 48 ML
LV EF BIPLANE MOD: 63 %
LV EF US.2D.A4C+ESTIMATED: 61 %
LVSV (TEICH): 48 ML
MV E'TISSUE VEL-LAT: 10 CM/S
MV E'TISSUE VEL-SEP: 11 CM/S
MV PEAK A VEL: 0.56 M/S
MV PEAK E VEL: 56 CM/S
MV STENOSIS PRESSURE HALF TIME: 48 MS
MV VALVE AREA P 1/2 METHOD: 4.58
RA PRESSURE ESTIMATED: 5 MMHG
RIGHT ATRIUM AREA SYSTOLE A4C: 15.4 CM2
RIGHT VENTRICLE ID DIMENSION: 2.9 CM
RV PSP: 23 MMHG
SL CV LEFT ATRIUM LENGTH A2C: 3.1 CM
SL CV LV EF: 55
SL CV PED ECHO LEFT VENTRICLE DIASTOLIC VOLUME (MOD BIPLANE) 2D: 71 ML
SL CV PED ECHO LEFT VENTRICLE SYSTOLIC VOLUME (MOD BIPLANE) 2D: 24 ML
TR MAX PG: 18 MMHG
TR PEAK VELOCITY: 2.1 M/S
TRICUSPID ANNULAR PLANE SYSTOLIC EXCURSION: 2.3 CM
TRICUSPID VALVE PEAK REGURGITATION VELOCITY: 2.12 M/S

## 2025-01-28 PROCEDURE — 93325 DOPPLER ECHO COLOR FLOW MAPG: CPT

## 2025-01-28 PROCEDURE — 93356 MYOCRD STRAIN IMG SPCKL TRCK: CPT | Performed by: INTERNAL MEDICINE

## 2025-01-28 PROCEDURE — 93308 TTE F-UP OR LMTD: CPT

## 2025-01-28 PROCEDURE — 93325 DOPPLER ECHO COLOR FLOW MAPG: CPT | Performed by: INTERNAL MEDICINE

## 2025-01-28 PROCEDURE — 93321 DOPPLER ECHO F-UP/LMTD STD: CPT | Performed by: INTERNAL MEDICINE

## 2025-01-28 PROCEDURE — 93321 DOPPLER ECHO F-UP/LMTD STD: CPT

## 2025-01-28 PROCEDURE — 93308 TTE F-UP OR LMTD: CPT | Performed by: INTERNAL MEDICINE

## 2025-01-30 ENCOUNTER — HOSPITAL ENCOUNTER (OUTPATIENT)
Dept: INFUSION CENTER | Facility: CLINIC | Age: 65
Discharge: HOME | End: 2025-01-30
Payer: COMMERCIAL

## 2025-01-30 VITALS
DIASTOLIC BLOOD PRESSURE: 67 MMHG | SYSTOLIC BLOOD PRESSURE: 139 MMHG | TEMPERATURE: 97.9 F | RESPIRATION RATE: 18 BRPM | HEART RATE: 95 BPM

## 2025-01-30 DIAGNOSIS — T38.6X5A OSTEOPOROSIS DUE TO AROMATASE INHIBITOR: Primary | ICD-10-CM

## 2025-01-30 DIAGNOSIS — M81.8 OSTEOPOROSIS DUE TO AROMATASE INHIBITOR: Primary | ICD-10-CM

## 2025-01-30 PROCEDURE — 96372 THER/PROPH/DIAG INJ SC/IM: CPT

## 2025-01-30 RX ADMIN — DENOSUMAB 60 MG: 60 INJECTION SUBCUTANEOUS at 12:11

## 2025-01-30 NOTE — PROGRESS NOTES
Pt presents for prolia. CrCl 70.2, Ca 9.7, pt denies any recent or upcoming dental work. Pt tolerated injection to left arm without incident. Pt declined AVS, aware of next appt 3/4 at 12pm for port flush/labs and next prolia appt 7/31 at 12pm.

## 2025-02-21 ENCOUNTER — TELEPHONE (OUTPATIENT)
Dept: OBGYN CLINIC | Facility: HOSPITAL | Age: 65
End: 2025-02-21

## 2025-02-25 ENCOUNTER — HOSPITAL ENCOUNTER (OUTPATIENT)
Dept: MAMMOGRAPHY | Facility: CLINIC | Age: 65
Discharge: HOME | End: 2025-02-25
Payer: COMMERCIAL

## 2025-02-25 ENCOUNTER — APPOINTMENT (OUTPATIENT)
Dept: ULTRASOUND IMAGING | Facility: CLINIC | Age: 65
End: 2025-02-25
Payer: COMMERCIAL

## 2025-02-25 VITALS — BODY MASS INDEX: 21.16 KG/M2 | HEIGHT: 65 IN | WEIGHT: 127 LBS

## 2025-02-25 DIAGNOSIS — R92.8 ABNORMAL FINDINGS ON DIAGNOSTIC IMAGING OF BREAST: ICD-10-CM

## 2025-02-25 PROCEDURE — G0279 TOMOSYNTHESIS, MAMMO: HCPCS

## 2025-02-25 PROCEDURE — 77066 DX MAMMO INCL CAD BI: CPT

## 2025-02-26 ENCOUNTER — HOSPITAL ENCOUNTER (OUTPATIENT)
Dept: RADIOLOGY | Age: 65
Discharge: HOME | End: 2025-02-26
Payer: COMMERCIAL

## 2025-02-26 DIAGNOSIS — Z12.39 ENCOUNTER FOR BREAST CANCER SCREENING OTHER THAN MAMMOGRAM: ICD-10-CM

## 2025-02-26 DIAGNOSIS — R92.30 DENSE BREASTS: ICD-10-CM

## 2025-02-26 PROCEDURE — 76641 ULTRASOUND BREAST COMPLETE: CPT

## 2025-02-27 ENCOUNTER — TELEPHONE (OUTPATIENT)
Age: 65
End: 2025-02-27

## 2025-02-27 NOTE — TELEPHONE ENCOUNTER
Patient is calling to request that her US breast screening bilateral complete (ABUS) and mammogram diagnostic bilateral w 3d & cad orders be placed so she can call and schedule for next year.  [facility] called but they were busy helping other patients.

## 2025-02-27 NOTE — TELEPHONE ENCOUNTER
Spoke with patient and reviewed HUSSAIN De Luna's recommendations that the imaging orders will be placed during her survivorship appointment in July. Pt verbalized understanding and agreeable with plan.

## 2025-03-04 ENCOUNTER — HOSPITAL ENCOUNTER (OUTPATIENT)
Dept: INFUSION CENTER | Facility: CLINIC | Age: 65
Discharge: HOME | End: 2025-03-04
Payer: COMMERCIAL

## 2025-03-04 DIAGNOSIS — Z95.828 PORT-A-CATH IN PLACE: Primary | ICD-10-CM

## 2025-03-04 PROCEDURE — 96523 IRRIG DRUG DELIVERY DEVICE: CPT

## 2025-03-04 NOTE — PROGRESS NOTES
Patient presents for port flush and labs. Port accessed, brisk blood return noted, and deaccessed per protocol without incident. Patient declines AVS. Aware of next appointment on 4/15 @ 12pm.

## 2025-03-05 ENCOUNTER — OFFICE VISIT (OUTPATIENT)
Dept: HEMATOLOGY ONCOLOGY | Facility: CLINIC | Age: 65
End: 2025-03-05
Payer: COMMERCIAL

## 2025-03-05 VITALS
SYSTOLIC BLOOD PRESSURE: 128 MMHG | HEIGHT: 65 IN | OXYGEN SATURATION: 98 % | WEIGHT: 127 LBS | DIASTOLIC BLOOD PRESSURE: 72 MMHG | BODY MASS INDEX: 21.16 KG/M2 | TEMPERATURE: 97.9 F | RESPIRATION RATE: 18 BRPM | HEART RATE: 95 BPM

## 2025-03-05 DIAGNOSIS — C50.911 HER2-POSITIVE CARCINOMA OF RIGHT BREAST (HCC): ICD-10-CM

## 2025-03-05 DIAGNOSIS — Z17.31 HER2-POSITIVE CARCINOMA OF RIGHT BREAST (HCC): ICD-10-CM

## 2025-03-05 DIAGNOSIS — M85.89 OSTEOPENIA OF MULTIPLE SITES: ICD-10-CM

## 2025-03-05 DIAGNOSIS — C50.111 MALIGNANT NEOPLASM OF CENTRAL PORTION OF RIGHT BREAST IN FEMALE, ESTROGEN RECEPTOR POSITIVE (HCC): ICD-10-CM

## 2025-03-05 DIAGNOSIS — C77.3 MALIGNANT NEOPLASM METASTATIC TO LYMPH NODE OF AXILLA (HCC): Primary | ICD-10-CM

## 2025-03-05 DIAGNOSIS — Z17.0 MALIGNANT NEOPLASM OF CENTRAL PORTION OF RIGHT BREAST IN FEMALE, ESTROGEN RECEPTOR POSITIVE (HCC): ICD-10-CM

## 2025-03-05 DIAGNOSIS — Z95.828 PORT-A-CATH IN PLACE: ICD-10-CM

## 2025-03-05 PROCEDURE — 99214 OFFICE O/P EST MOD 30 MIN: CPT | Performed by: INTERNAL MEDICINE

## 2025-03-05 RX ORDER — LETROZOLE 2.5 MG/1
2.5 TABLET, FILM COATED ORAL DAILY
Qty: 30 TABLET | Refills: 5 | Status: SHIPPED | OUTPATIENT
Start: 2025-03-05

## 2025-03-05 NOTE — PROGRESS NOTES
Name: Emily Hernandez      : 1960      MRN: [id number]  Encounter Provider: Charly Jason MD  Encounter Date: 3/5/2025   Encounter department: [facility]  :  Assessment & Plan  Malignant neoplasm of central portion of right breast in female, estrogen receptor positive (HCC)  Triple positive right breast cancer status post neoadjuvant chemotherapy and immunotherapy, TCHP followed by lumpectomy and sentinel lymph node sampling.  Herceptin and Perjeta were continued for a total of 1 year.  Patient had radiation therapy.  Letrozole was started in 2022.  Patient is on adjuvant letrozole and she has been tolerating that without much problem.  For osteopenia at multiple sites she has been on Prolia.  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  Patient had HER2 directed adjuvant chemotherapy as above.       Port-A-Cath in place  She gets it flushed       Osteopenia of multiple sites  Patient is on Prolia and she takes vitamin D and calcium.       Blood work in 2025.  Follow-up in 5 months.  Please continue taking Prolia.  Discussed clinical picture pertaining to breast cancer with the patient in detail.  Questions answered.  Goal is cure from breast cancer.  Patient is capable of self-care.  All discussed in detail.  Questions answered.  She has been continued on letrozole.I suggested self breast examination, eating healthy foods, staying active but to avoid falls and trauma.  Suggested health screening tests. Patient to continue to follow-up with primary physician and other consultants.  Provided counseling and support.  I used a dictation device to dictate this note and  there could be mistakes in my note and for that patient may contact my office.          History of Present Illness   Chief Complaint   Patient presents with    Follow-up   Patient is here with her .  Triple positive right breast cancer was diagnosed in March 2022 and that was T2 NX clinically.  Negative distant metastatic disease.  Grade 2.  ER 70-80% and MS 70-80% and HER2 3+ positive.  Patient had 6 cycles of neoadjuvant chemotherapy and immunotherapy, TCHP followed by lumpectomy and sentinel lymph node sampling in September 2022 and there was a residual 1.8 cm invasive cancer.  Lymph nodes were negative.  Patient received radiation.  She received Herceptin plus Perjeta for a total of 1 year.  Last treatment was in April 2023.  Since November 2022 patient has been on letrozole and she has been tolerating that without much problem.  For osteopenia at multiple sites patient has been receiving Prolia and also she takes vitamin D and calcium.  She has uterine prolapse and she will be going for hysterectomy and she is debating whether to have BSO or not.  Her gynecologist has suggested to have BSO also.  Patient gave me all this information.  She is anxious.    Oncology History   Cancer Staging   Malignant neoplasm of central portion of right breast in female, estrogen receptor positive (HCC)  Staging form: Breast, AJCC 8th Edition  - Clinical stage from 4/6/2022: Stage IB (cT2, cN0, cM0, G2, ER+, MS+, HER2+) - Signed by Azalea Motta MD on 4/6/2022  Stage prefix: Initial diagnosis  Method of lymph node assessment: Clinical  Histologic grading system: 3 grade system  - Pathologic stage from 10/18/2022: No Stage Recommended (ypT1c, pN0(sn), cM0, G2, ER+, MS+, HER2+) - Signed by Azalea Motta MD on 10/18/2022  Stage prefix: Post-therapy  Method of lymph node assessment: [place] lymph node biopsy  Histologic grading system: 3 grade system  Oncology History   Malignant neoplasm of central portion of right breast in  female, estrogen receptor positive (HCC)   3/29/2022 Biopsy    Right breast biopsy:  - Invasive ductal carcinoma  Grade 2  ER 70%   DE 70%  HER2 positive     4/6/2022 -  Cancer Staged    Staging form: Breast, AJCC 8th Edition  - Clinical stage from 4/6/2022: Stage IB (cT2, cN0, cM0, G2, ER+, DE+, HER2+) - Signed by Azalea Motta MD on 4/6/2022  Stage prefix: Initial diagnosis  Method of lymph node assessment: Clinical  Histologic grading system: 3 grade system       5/4/2022 -  Chemotherapy    Pegfilgrastim-bmez (ZIEXTENZO), 6 mg, Subcutaneous, Once, 6 of 6 cycles  Administration: 6 mg (5/5/2022), 6 mg (5/26/2022), 6 mg (6/16/2022), 6 mg (7/7/2022), 6 mg (7/28/2022), 6 mg (8/18/2022)  fosaprepitant (EMEND) IVPB, 150 mg, Intravenous, Once, 6 of 6 cycles  Administration: 150 mg (5/4/2022), 150 mg (5/25/2022), 150 mg (6/15/2022), 150 mg (7/6/2022), 150 mg (7/27/2022), 150 mg (8/17/2022)  pertuzumab (PERJETA) IVPB, 840 mg, Intravenous, Once, 18 of 18 cycles  Administration: 840 mg (5/4/2022), 420 mg (5/25/2022), 420 mg (9/7/2022), 420 mg (6/15/2022), 420 mg (7/6/2022), 420 mg (7/27/2022), 420 mg (8/17/2022), 420 mg (9/28/2022), 420 mg (10/19/2022), 420 mg (11/9/2022), 420 mg (11/30/2022), 420 mg (12/21/2022), 420 mg (1/11/2023), 420 mg (2/1/2023), 420 mg (2/22/2023), 420 mg (3/15/2023), 420 mg (4/5/2023), 420 mg (4/26/2023)  CARBOplatin (PARAPLATIN) IVPB (GOG AUC DOSING), 588.6 mg, Intravenous, Once, 6 of 6 cycles  Administration: 588.6 mg (5/4/2022), 588.6 mg (5/25/2022), 588.6 mg (6/15/2022), 582.6 mg (7/6/2022), 588.6 mg (7/27/2022), 582.6 mg (8/17/2022)  trastuzumab-qyyp (TRAZIMERA) chemo infusion, 6 mg/kg = 330 mg (100 % of original dose 6 mg/kg), Intravenous, Once, 11 of 11 cycles  Dose modification: 6 mg/kg (original dose 6 mg/kg, Cycle 8), 6 mg/kg (original dose 6 mg/kg, Cycle 9), 6 mg/kg (original dose 6 mg/kg, Cycle 12)  Administration: 330 mg (9/28/2022), 330 mg (10/19/2022), 330 mg (11/9/2022), 361 mg  (11/30/2022), 361 mg (12/21/2022), 361 mg (1/11/2023), 361 mg (2/1/2023), 361 mg (2/22/2023), 361 mg (3/15/2023), 361 mg (4/5/2023), 361 mg (4/26/2023)  DOCEtaxel (TAXOTERE) chemo infusion, 75 mg/m2 = 120 mg, Intravenous, Once, 6 of 6 cycles  Administration: 120 mg (5/4/2022), 120 mg (5/25/2022), 120 mg (6/15/2022), 120 mg (7/6/2022), 120 mg (7/27/2022), 120 mg (8/17/2022)  trastuzumab (HERCEPTIN) chemo infusion, 439 mg, Intravenous, Once, 7 of 7 cycles  Administration: 450 mg (5/4/2022), 330 mg (5/25/2022), 330 mg (9/7/2022), 330 mg (6/15/2022), 330 mg (7/6/2022), 330 mg (7/27/2022), 330 mg (8/17/2022)     8/2022 Genetic Testing    [facility] BRCAplus STAT Panel (8 genes): ASHU, BRCA1, BRCA2, CDH1, CHEK2, PALB2, PTEN, TP53 with reflex to [facility] CancerNext + RNA (28 additional genes): APC, AXIN2 BARD1, BRIP1, BMPR1A, CDK4, CDKN2A, DICER1, EPCAM, GREM1, HOXB13, MLH1, MSH2, MSH3, MSH6, MUTYH, NBN, NF1, NTHL1, PMS2, POLD1, POLE, RAD51C, RAD51D, RECQL SMAD4, SMARCA4, STK11    Negative     9/30/2022 Surgery    Right lumpectomy with sentinel lymph node biopsy:  - clear margins  - 0/8 lymph nodes    Dr. Motta     10/18/2022 -  Cancer Staged    Staging form: Breast, AJCC 8th Edition  - Pathologic stage from 10/18/2022: No Stage Recommended (ypT1c, pN0(sn), cM0, G2, ER+, WI+, HER2+) - Signed by Azalea Motta MD on 10/18/2022  Stage prefix: Post-therapy  Method of lymph node assessment: [place] lymph node biopsy  Histologic grading system: 3 grade system       11/11/2022 - 12/9/2022 Radiation    Plan ID Energy Fractions Dose per Fraction (cGy) Dose Correction (cGy) Total Dose Delivered (cGy) Elapsed Days   R Breast 6X 16 / 16 266 0 4,256 24   R Brst Boost 12E 4 / 4 250 0 1,000 3   Dr. Henry     11/2022 -  Hormone Therapy    Letrozole   Dr. Jason        Pertinent Medical History     03/05/25:    See details in HPI and assessment.    Review of Systems  .  Reviewed 12 systems.  Symptoms are as in HPI.  No other neurological,  "cardiac, pulmonary, GI and  symptoms other than listed in HPI.  No fevers, chills, bleeding, bone pains, skin rash, weight loss, night sweats and no swelling of the ankles and no swollen glands.      Objective   /72 (BP Location: Left arm, Patient Position: Sitting, Cuff Size: Adult)   Pulse 95   Temp 97.9 °F (36.6 °C) (Temporal)   Resp 18   Ht 5' 5\" (1.651 m)   Wt 57.6 kg (127 lb)   SpO2 98%   BMI 21.13 kg/m²     Pain Screening:  Pain Score: 0-No pain  ECOG   0  Physical Exam  Constitutional:       General: She is not in acute distress.     Appearance: Normal appearance. She is not ill-appearing.   HENT:      Head: Normocephalic and atraumatic.      Mouth/Throat:      Comments: No thrush.  Eyes:      General: No scleral icterus.     Conjunctiva/sclera: Conjunctivae normal.   Cardiovascular:      Rate and Rhythm: Normal rate and regular rhythm.      Heart sounds: Normal heart sounds. No murmur heard.  Pulmonary:      Effort: Pulmonary effort is normal. No respiratory distress.      Breath sounds: Normal breath sounds. No rhonchi or rales.   Abdominal:      General: Abdomen is flat. There is no distension.      Palpations: Abdomen is soft. There is no mass.      Tenderness: There is no abdominal tenderness.      Comments: No ascites.    Musculoskeletal:         General: No swelling or tenderness. Normal range of motion.      Cervical back: Normal range of motion. No tenderness.      Right lower leg: No edema.      Left lower leg: No edema.      Comments: No calf tenderness.   Lymphadenopathy:      Cervical: No cervical adenopathy.      Upper Body:      Right upper body: No supraclavicular or axillary adenopathy.      Left upper body: No supraclavicular or axillary adenopathy.   Skin:     Coloration: Skin is not jaundiced or pale.      Findings: No bruising or rash.      Nails: There is no clubbing.   Neurological:      General: No focal deficit present.      Mental Status: She is alert and oriented to " person, place, and time.      Motor: No weakness.      Coordination: Coordination normal.      Gait: Gait normal.   Psychiatric:         Behavior: Behavior normal.         Thought Content: Thought content normal.      Comments: Anxious     According to my medical assistant patient declined breast examination because she gets that from her breast surgeon.  No lymphedema.    Labs: I have reviewed the following labs:  Lab Results   Component Value Date/Time    WBC 4.71 01/22/2025 12:19 PM    RBC 4.05 01/22/2025 12:19 PM    Hemoglobin 12.5 01/22/2025 12:19 PM    Hematocrit 38.5 01/22/2025 12:19 PM    MCV 95 01/22/2025 12:19 PM    MCH 30.9 01/22/2025 12:19 PM    RDW 12.1 01/22/2025 12:19 PM    Platelets 244 01/22/2025 12:19 PM    Segmented % 57 01/22/2025 12:19 PM    Lymphocytes % 25 01/22/2025 12:19 PM    Monocytes % 9 01/22/2025 12:19 PM    Eosinophils Relative 7 (H) 01/22/2025 12:19 PM    Basophils Relative 2 (H) 01/22/2025 12:19 PM    Immature Grans % 0 01/22/2025 12:19 PM    Absolute Neutrophils 2.74 01/22/2025 12:19 PM     Lab Results   Component Value Date/Time    Potassium 4.3 01/22/2025 12:19 PM    Chloride 105 01/22/2025 12:19 PM    CO2 27 01/22/2025 12:19 PM    BUN 15 01/22/2025 12:19 PM    Creatinine 0.63 01/22/2025 12:19 PM    Calcium 9.7 01/22/2025 12:19 PM    AST 16 01/22/2025 12:19 PM    ALT 12 01/22/2025 12:19 PM    Alkaline Phosphatase 32 (L) 01/22/2025 12:19 PM    Total Protein 6.6 01/22/2025 12:19 PM    Albumin 4.4 01/22/2025 12:19 PM    Total Bilirubin 0.52 01/22/2025 12:19 PM    eGFR 95 01/22/2025 12:19 PM              Component  Ref Range & Units (hover) 1/22/25 12:19 PM 9/26/24 12:41 PM 7/3/24 12:26 PM 3/6/24 12:23 PM 11/16/23 12:37 PM 8/24/23 12:45 PM   CA 27-29 12.1 17.9 CM 9.7 CM 14.5 CM 12.9 CM 10.5 CM   Comment: Siemens Centaur Immunochemiluminometric Methodology (ICMA)  Values obtained with different assay methods or kits cannot be used  interchangeably. Results cannot be interpreted as  absolute evidence  of the presence or absence of malignant disease.                 IMPRESSION:   There is no evidence of malignancy in either breast.           ASSESSMENT/BI-RADS CATEGORY:  Left: 2 - Benign  Right: 2 - Benign  Overall: 2 - Benign     RECOMMENDATION:       - Routine screening mammogram in 1 year for both breasts.     Workstation ID: [id number]       Signed by:  Gena Randle MD                 Imaging    Mammo diagnostic bilateral w 3d & cad (Order: [id number]) - 2/25/2025    IMPRESSION:   No sonographic evidence of malignancy in either breast.     ASSESSMENT/BI-RADS CATEGORY:  Left: 1 - Negative  Right: 2 - Benign  Overall: 2 - Benign     RECOMMENDATION:       - Continue annual screening mammography for both breasts.     Workstation ID: [id number]        Signed by:  Liang Power MD                Imaging    US breast screening bilateral complete (ABUS) (Order: [id number]) - 2/26/2025    DXA bone density spine hip and pelvis  Status: Final result     PACS Images     Show images for DXA bone density spine hip and pelvis  Study Result    Narrative & Impression   DXA SCAN     CLINICAL HISTORY: 64-year-old postmenopausal female.  OTHER RISK FACTORS: Parental history of hip fracture status post minor injury, letrozole.     PHARMACOLOGIC THERAPY FOR OSTEOPOROSIS: Prolia.     TECHNIQUE: Bone densitometry was performed using a Hologic Horizon W bone densitometer.  Regions of interest appear properly placed.     COMPARISON: 12/13/2022.     RESULTS:     LUMBAR SPINE  Level: L1, L3, L4  (L2 vertebra excluded from analysis due to local structural abnormalities or artifact):  BMD: 1.116 gm/cm2  T-score: 0.6        LEFT  TOTAL HIP:  BMD: 0.890 gm/cm2  T-score: -0.4     LEFT  FEMORAL NECK:  BMD: 0.640 gm/cm2  T score: -1.9        IMPRESSION:     1. Low bone mass (osteopenia).        2.  Since a DXA study from 12/13/2022, there has been:  A  STATISTICALLY SIGNIFICANT INCREASE in bone mineral  density of 0.047 g/cm2 (5.6%) in the left total hip.  A  STATISTICALLY SIGNIFICANT INCREASE in bone mineral density of 0.039 g/cm2 (3.7)% in the lumbar spine.           3.  The 10 year risk of hip fracture is 1.2% with the 10 year risk of major osteoporotic fracture being 16% as calculated by the [employer] fracture risk assessment tool (FRAX, which is based on data generated by the WHO Collaborating [place]   for Metabolic Bone Diseases).     4.  The current Bone Health and Osteoporosis Foundation (BHOF) guidelines recommend treating patients with a T-score of -2.5 or less in the lumbar spine or hips, or in post-menopausal women and men over the age of 50 with low bone mass (osteopenia;   T-score between -1.0 and -2.5) and a FRAX 10 year risk score of > 3% for hip fracture and/or > 20% for major osteoporosis-related fracture (clinical vertebral, hip, forearm, or proximal humerus).     5.  The BHOF recommends follow-up DXA in 1-2 years after initiating or changing medical therapy for osteoporosis and at appropriate intervals thereafter, according to clinical circumstances. More frequent BMD testing may be warranted in higher-risk   individuals (multiple fractures, older age, very low BMD). Less frequent BMD testing is warranted as follow-up in patients with initial T-scores in the normal or slightly below normal range (osteopenia) and for patients who have remained fracture free on   treatment.        The FRAX algorithm has certain limitations:  -FRAX has not been validated in patients currently or previously treated with pharmacotherapy for osteoporosis.  In such patients, clinical judgment must be exercised in interpreting FRAX scores.  -Prior hip, vertebral and humeral fragility fractures appear to confer greater risk of subsequent fracture than fractures at other sites (this is especially true for individuals with severe vertebral fractures), but quantification of this incremental   risk is not  possible with FRAX.  -FRAX underestimates fracture risk in patients with history of multiple fragility fractures.  -FRAX may underestimate fracture risk in patients with history of frequent falls.  -It is not appropriate to use FRAX to monitor treatment response.        WHO CLASSIFICATION:  Normal (a T-score of -1.0 or higher)  Low bone mineral density (a T-score of less than -1.0 but higher than -2.5)  Osteoporosis (a T-score of -2.5 or less)  Severe osteoporosis (a T-score of -2.5 or less with a fragility fracture)        LEAST SIGNIFICANT CHANGE (AT 95% C.I):  Lumbar spine: 0.020 g/cm2 (2.2%)  Total hip: 0.033 g/cm2 (3.8%)  Forearm: 0.021 g/cm2 (3.3%)           SELECTED REFERENCES:     Jaymie MS, Garth SL, Cliff LYNCH, et al. The clinician's guide to prevention and treatment of osteoporosis. Osteoporos Int 2022; 33:0366-1571.     Jose D, Deon SB, Jocelyn A, et al. DXA reporting updates: 2023 Official Positions of the International Society for Clinical Densitometry. J Clin Densitom 2024; 27: 544008 (https://doi.org/10.1016/j.jocd.2023.352028).     Workstation performed: [id number]        Imaging    DXA bone density spine hip and pelvis (Order: [id number]) - 12/16/2024

## 2025-03-05 NOTE — ASSESSMENT & PLAN NOTE
Triple positive right breast cancer status post neoadjuvant chemotherapy and immunotherapy, TCHP followed by lumpectomy and sentinel lymph node sampling.  Herceptin and Perjeta were continued for a total of 1 year.  Patient had radiation therapy.  Letrozole was started in November 2022.  Patient is on adjuvant letrozole and she has been tolerating that without much problem.  For osteopenia at multiple sites she has been on Prolia.  Orders:    letrozole (FEMARA) 2.5 mg tablet; Take 1 tablet (2.5 mg total) by mouth daily    CBC and differential; Future    Comprehensive metabolic panel; Future    Cancer antigen 27.29; Future

## 2025-03-21 ENCOUNTER — TELEPHONE (OUTPATIENT)
Age: 65
End: 2025-03-21

## 2025-03-21 NOTE — TELEPHONE ENCOUNTER
Patient calling in, was told by Dr Motta to schedule her appt in January with her from now, as the last time she tried to schedule it there was nothing available, patient would like to be seen 1/19 or 1/21 if possible. Please call her back at [phone] to assist.   verbal instruction

## 2025-03-25 NOTE — TELEPHONE ENCOUNTER
Called patient and left a voicemail. Reminded her of her appointment in July (7/30/25 at 3:30) with Osvaldo and let her know I scheduled an additional 6 month follow up with Dr. Motta in January (January 21, 2026 at 3:45). [facility] number was left for call back if needed.

## 2025-04-04 ENCOUNTER — TELEPHONE (OUTPATIENT)
Age: 65
End: 2025-04-04

## 2025-04-04 NOTE — TELEPHONE ENCOUNTER
Patient called in was speaking to a Taina in billing. I did not see any notes.   Patient is coming to [place] location for soonest appointment in regards to prolapse. Patient usually goes to [facility]. When patient was speaking to billing they had said that patients insurance would not cover the appointment in [place]. Can you just please verify that patient can go there?   Thank You

## 2025-04-04 NOTE — TELEPHONE ENCOUNTER
Called number on file in regards to billing inquiry. Patient was in contact with billing office who advised her that her upcoming appointment would not be covered. Our office does accept [employer] insurance however this does not insure that this specific visit will be completely covered by her plan. Patient is looking to schedule for a pessary appointment. Patient may contact her insurance with the following CPT codes to see if her plan would cover this procedure. Unable to leave message, sent MyChart to patient to contact office to review information.    Pessary Insertion/Removal CPT Code: 96409    Pessary Cleaning/Check CPT Code: 46991

## 2025-04-14 ENCOUNTER — TELEPHONE (OUTPATIENT)
Age: 65
End: 2025-04-14

## 2025-04-14 NOTE — TELEPHONE ENCOUNTER
Patient called in with questions re: pessary. Wanted to know if the pessary was adustable/removeable and what procedure would be if there was discomfort. Reviewed insertion and evaluation is done in office visits, advised patient not to try to remove it herself if she has any concerns after insertion that she should call office. Patient verbalized understanding and is thankful.

## 2025-04-14 NOTE — TELEPHONE ENCOUNTER
Pt calling in asking about her pessary appt, pt was notified of her April 25th appt, and pt asking if needed adjustment for pessary, will she be able to come in for an appt, pt was notified if an adjustment was needed she would be able to be seen, no further questions.

## 2025-04-15 ENCOUNTER — HOSPITAL ENCOUNTER (OUTPATIENT)
Dept: INFUSION CENTER | Facility: CLINIC | Age: 65
Discharge: HOME | End: 2025-04-15
Payer: COMMERCIAL

## 2025-04-15 ENCOUNTER — TELEPHONE (OUTPATIENT)
Dept: OBGYN CLINIC | Facility: MEDICAL CENTER | Age: 65
End: 2025-04-15

## 2025-04-15 ENCOUNTER — TELEPHONE (OUTPATIENT)
Dept: OBGYN CLINIC | Facility: CLINIC | Age: 65
End: 2025-04-15

## 2025-04-15 DIAGNOSIS — Z95.828 PORT-A-CATH IN PLACE: Primary | ICD-10-CM

## 2025-04-15 PROCEDURE — 96523 IRRIG DRUG DELIVERY DEVICE: CPT

## 2025-04-15 NOTE — TELEPHONE ENCOUNTER
Spoke to pt regarding script and told pt I will get back in contact with her after provider reviews chart.

## 2025-04-15 NOTE — TELEPHONE ENCOUNTER
Pt came to office requesting her Mammogram and [facility] breast screening script can be place it. She want to schedule her Mammo and Us for next years. Please advise

## 2025-04-16 ENCOUNTER — TELEPHONE (OUTPATIENT)
Dept: OBGYN CLINIC | Facility: MEDICAL CENTER | Age: 65
End: 2025-04-16

## 2025-04-16 DIAGNOSIS — Z85.3 PERSONAL HISTORY OF BREAST CANCER: Primary | ICD-10-CM

## 2025-05-06 ENCOUNTER — TELEPHONE (OUTPATIENT)
Age: 65
End: 2025-05-06

## 2025-05-06 NOTE — TELEPHONE ENCOUNTER
Incoming call from patient. States she filled out medical release to have records from season of like OBGYN sent to office. Chart reviewed, can see medical release form filled out and scanned into chart 4/11/24. In media tab there are scans on 3/20/24 which have a small amount of documentation and pap result from 2/22/18 but there should be more recent pap that was performed by this office.     Routing to clerical team for follow-up. Unsure if med release will need to be resent to office to request records again.

## 2025-05-07 DIAGNOSIS — M81.8 OSTEOPOROSIS DUE TO AROMATASE INHIBITOR: Primary | ICD-10-CM

## 2025-05-07 DIAGNOSIS — T38.6X5A OSTEOPOROSIS DUE TO AROMATASE INHIBITOR: Primary | ICD-10-CM

## 2025-05-28 ENCOUNTER — HOSPITAL ENCOUNTER (OUTPATIENT)
Dept: INFUSION CENTER | Facility: CLINIC | Age: 65
Discharge: HOME | End: 2025-05-28
Payer: COMMERCIAL

## 2025-05-28 DIAGNOSIS — Z95.828 PORT-A-CATH IN PLACE: Primary | ICD-10-CM

## 2025-05-28 PROCEDURE — 96523 IRRIG DRUG DELIVERY DEVICE: CPT

## 2025-05-28 NOTE — PROGRESS NOTES
Pt. Denied new symptoms or concerns today. Port maintenance completed noting excellent blood return. Pt. Will return 7/9 at 1200 for labs prior to appt with Dr. Jason and future prolia injection.  AVS declined.

## 2025-05-28 NOTE — PLAN OF CARE
Problem: INFECTION - ADULT  Goal: Absence or prevention of progression during hospitalization  Description: INTERVENTIONS:  - Assess and monitor for signs and symptoms of infection  - Monitor lab/diagnostic results  - Monitor all insertion sites, i.e. indwelling lines, tubes, and drains  - Monitor endotracheal if appropriate and nasal secretions for changes in amount and color  - [place] appropriate cooling/warming therapies per order  - Administer medications as ordered  - Instruct and encourage patient and family to use good hand hygiene technique  - Identify and instruct in appropriate isolation precautions for identified infection/condition  5/28/2025 1254 by Tata Juarez RN  Outcome: Progressing  5/28/2025 1254 by Tata Juarez RN  Outcome: Progressing  Goal: Absence of fever/infection during neutropenic period  Description: INTERVENTIONS:  - Monitor WBC  - Perform strict hand hygiene  - Limit to healthy visitors only  - No plants, dried, fresh or silk flowers with santos in patient room  5/28/2025 1254 by Tata Juarez RN  Outcome: Progressing  5/28/2025 1254 by Tata Juarez RN  Outcome: Progressing     Problem: Knowledge Deficit  Goal: Patient/family/caregiver demonstrates understanding of disease process, treatment plan, medications, and discharge instructions  Description: Complete learning assessment and assess knowledge base.  Interventions:  - Provide teaching at level of understanding  - Provide teaching via preferred learning methods  5/28/2025 1254 by Tata Juarez RN  Outcome: Progressing  5/28/2025 1254 by Tata Juarez RN  Outcome: Progressing

## 2025-05-28 NOTE — PLAN OF CARE
Problem: INFECTION - ADULT  Goal: Absence or prevention of progression during hospitalization  Description: INTERVENTIONS:  - Assess and monitor for signs and symptoms of infection  - Monitor lab/diagnostic results  - Monitor all insertion sites, i.e. indwelling lines, tubes, and drains  - Monitor endotracheal if appropriate and nasal secretions for changes in amount and color  - [place] appropriate cooling/warming therapies per order  - Administer medications as ordered  - Instruct and encourage patient and family to use good hand hygiene technique  - Identify and instruct in appropriate isolation precautions for identified infection/condition  Outcome: Progressing  Goal: Absence of fever/infection during neutropenic period  Description: INTERVENTIONS:  - Monitor WBC  - Perform strict hand hygiene  - Limit to healthy visitors only  - No plants, dried, fresh or silk flowers with santos in patient room  Outcome: Progressing     Problem: Knowledge Deficit  Goal: Patient/family/caregiver demonstrates understanding of disease process, treatment plan, medications, and discharge instructions  Description: Complete learning assessment and assess knowledge base.  Interventions:  - Provide teaching at level of understanding  - Provide teaching via preferred learning methods  Outcome: Progressing

## 2025-07-09 ENCOUNTER — HOSPITAL ENCOUNTER (OUTPATIENT)
Dept: INFUSION CENTER | Facility: CLINIC | Age: 65
Discharge: HOME | End: 2025-07-09
Payer: COMMERCIAL

## 2025-07-09 DIAGNOSIS — C50.111 MALIGNANT NEOPLASM OF CENTRAL PORTION OF RIGHT BREAST IN FEMALE, ESTROGEN RECEPTOR POSITIVE (HCC): ICD-10-CM

## 2025-07-09 DIAGNOSIS — Z95.828 PORT-A-CATH IN PLACE: Primary | ICD-10-CM

## 2025-07-09 DIAGNOSIS — Z17.0 MALIGNANT NEOPLASM OF CENTRAL PORTION OF RIGHT BREAST IN FEMALE, ESTROGEN RECEPTOR POSITIVE (HCC): ICD-10-CM

## 2025-07-09 LAB
ALBUMIN SERPL BCG-MCNC: 4.2 G/DL (ref 3.5–5)
ALP SERPL-CCNC: 34 U/L (ref 34–104)
ALT SERPL W P-5'-P-CCNC: 13 U/L (ref 7–52)
ANION GAP SERPL CALCULATED.3IONS-SCNC: 6 MMOL/L (ref 4–13)
AST SERPL W P-5'-P-CCNC: 20 U/L (ref 13–39)
BASOPHILS # BLD AUTO: 0.08 THOUSANDS/ÂΜL (ref 0–0.1)
BASOPHILS NFR BLD AUTO: 2 % (ref 0–1)
BILIRUB SERPL-MCNC: 0.41 MG/DL (ref 0.2–1)
BUN SERPL-MCNC: 16 MG/DL (ref 5–25)
CALCIUM SERPL-MCNC: 9.2 MG/DL (ref 8.4–10.2)
CHLORIDE SERPL-SCNC: 107 MMOL/L (ref 96–108)
CO2 SERPL-SCNC: 27 MMOL/L (ref 21–32)
CREAT SERPL-MCNC: 0.68 MG/DL (ref 0.6–1.3)
EOSINOPHIL # BLD AUTO: 0.37 THOUSAND/ÂΜL (ref 0–0.61)
EOSINOPHIL NFR BLD AUTO: 9 % (ref 0–6)
ERYTHROCYTE [DISTWIDTH] IN BLOOD BY AUTOMATED COUNT: 12.1 % (ref 11.6–15.1)
GFR SERPL CREATININE-BSD FRML MDRD: 92 ML/MIN/1.73SQ M
GLUCOSE SERPL-MCNC: 79 MG/DL (ref 65–140)
HCT VFR BLD AUTO: 36.5 % (ref 34.8–46.1)
HGB BLD-MCNC: 12.1 G/DL (ref 11.5–15.4)
IMM GRANULOCYTES # BLD AUTO: 0 THOUSAND/UL (ref 0–0.2)
IMM GRANULOCYTES NFR BLD AUTO: 0 % (ref 0–2)
LYMPHOCYTES # BLD AUTO: 1.18 THOUSANDS/ÂΜL (ref 0.6–4.47)
LYMPHOCYTES NFR BLD AUTO: 29 % (ref 14–44)
MCH RBC QN AUTO: 31.3 PG (ref 26.8–34.3)
MCHC RBC AUTO-ENTMCNC: 33.2 G/DL (ref 31.4–37.4)
MCV RBC AUTO: 94 FL (ref 82–98)
MONOCYTES # BLD AUTO: 0.36 THOUSAND/ÂΜL (ref 0.17–1.22)
MONOCYTES NFR BLD AUTO: 9 % (ref 4–12)
NEUTROPHILS # BLD AUTO: 2.04 THOUSANDS/ÂΜL (ref 1.85–7.62)
NEUTS SEG NFR BLD AUTO: 51 % (ref 43–75)
NRBC BLD AUTO-RTO: 0 /100 WBCS
PLATELET # BLD AUTO: 208 THOUSANDS/UL (ref 149–390)
PMV BLD AUTO: 9.4 FL (ref 8.9–12.7)
POTASSIUM SERPL-SCNC: 4.1 MMOL/L (ref 3.5–5.3)
PROT SERPL-MCNC: 6.4 G/DL (ref 6.4–8.4)
RBC # BLD AUTO: 3.87 MILLION/UL (ref 3.81–5.12)
SODIUM SERPL-SCNC: 140 MMOL/L (ref 135–147)
WBC # BLD AUTO: 4.03 THOUSAND/UL (ref 4.31–10.16)

## 2025-07-09 PROCEDURE — 80053 COMPREHEN METABOLIC PANEL: CPT

## 2025-07-09 PROCEDURE — 85025 COMPLETE CBC W/AUTO DIFF WBC: CPT

## 2025-07-09 PROCEDURE — 86300 IMMUNOASSAY TUMOR CA 15-3: CPT

## 2025-07-09 NOTE — PROGRESS NOTES
Pt presents for central labs. Port accessed, positive blood return noted, labs collected per protocol. Pt declined AVS, aware of next appt 7/31 for prolia injection.

## 2025-07-10 LAB — CANCER AG27-29 SERPL-ACNC: <9 U/ML (ref 0–38.6)

## 2025-07-16 ENCOUNTER — OFFICE VISIT (OUTPATIENT)
Dept: URGENT CARE | Age: 65
End: 2025-07-16
Payer: COMMERCIAL

## 2025-07-16 VITALS
HEART RATE: 84 BPM | OXYGEN SATURATION: 99 % | SYSTOLIC BLOOD PRESSURE: 118 MMHG | RESPIRATION RATE: 18 BRPM | DIASTOLIC BLOOD PRESSURE: 70 MMHG | TEMPERATURE: 98.9 F

## 2025-07-16 DIAGNOSIS — R09.81 SINUS CONGESTION: ICD-10-CM

## 2025-07-16 DIAGNOSIS — J02.9 SORE THROAT: ICD-10-CM

## 2025-07-16 DIAGNOSIS — R05.1 ACUTE COUGH: Primary | ICD-10-CM

## 2025-07-16 LAB — S PYO AG THROAT QL: NEGATIVE

## 2025-07-16 PROCEDURE — 87880 STREP A ASSAY W/OPTIC: CPT | Performed by: PHYSICIAN ASSISTANT

## 2025-07-16 PROCEDURE — 99203 OFFICE O/P NEW LOW 30 MIN: CPT | Performed by: PHYSICIAN ASSISTANT

## 2025-07-16 PROCEDURE — 87070 CULTURE OTHR SPECIMN AEROBIC: CPT | Performed by: PHYSICIAN ASSISTANT

## 2025-07-16 RX ORDER — METHYLPREDNISOLONE 4 MG/1
TABLET ORAL
Qty: 1 EACH | Refills: 0 | Status: SHIPPED | OUTPATIENT
Start: 2025-07-16

## 2025-07-16 RX ORDER — BENZONATATE 100 MG/1
100 CAPSULE ORAL 3 TIMES DAILY PRN
Qty: 20 CAPSULE | Refills: 0 | Status: SHIPPED | OUTPATIENT
Start: 2025-07-16

## 2025-07-16 NOTE — PATIENT INSTRUCTIONS
Patient was educated on sore throat, acute cough and sinus congestion.  Patient was prescribed steroids, antibiotics and Tessalon Perles.  Do not take any anti-inflammatories while on steroids.  Eat on antibiotics.  Any chest pain or shortness of breath go to ED.  If cough persist recommend chest x-ray.    Follow-up with PCP over next few days.

## 2025-07-16 NOTE — PROGRESS NOTES
[facility]  Name: Emily Hernandez      : 1960      MRN: [id number]  Encounter Provider: Trinity Daly PA-C  Encounter Date: 2025   Encounter department: [facility] [place]  :  Assessment & Plan  Acute cough    Orders:    amoxicillin-clavulanate (AUGMENTIN) 875-125 mg per tablet; Take 1 tablet by mouth every 12 (twelve) hours for 7 days    methylPREDNISolone 4 MG tablet therapy pack; Use as directed on package    benzonatate (TESSALON PERLES) 100 mg capsule; Take 1 capsule (100 mg total) by mouth 3 (three) times a day as needed for cough    Sore throat    Orders:    POCT rapid ANTIGEN strepA    Throat culture; Future    amoxicillin-clavulanate (AUGMENTIN) 875-125 mg per tablet; Take 1 tablet by mouth every 12 (twelve) hours for 7 days    Sinus congestion    Orders:    amoxicillin-clavulanate (AUGMENTIN) 875-125 mg per tablet; Take 1 tablet by mouth every 12 (twelve) hours for 7 days    methylPREDNISolone 4 MG tablet therapy pack; Use as directed on package        Patient Instructions    Patient was educated on sore throat, acute cough and sinus congestion.  Patient was prescribed steroids, antibiotics and Tessalon Perles.  Do not take any anti-inflammatories while on steroids.  Eat on antibiotics.  Any chest pain or shortness of breath go to ED.  If cough persist recommend chest x-ray.    Follow-up with PCP over next few days.  Follow up with PCP in 3-5 days.  Proceed to  ER if symptoms worsen.    If tests are performed, our office will contact you with results only if changes need to made to the care plan discussed with you at the visit. [facility].    Chief Complaint:   Chief Complaint   Patient presents with    Cough     Productive cough post nasal drip and sore throat     History of Present Illness   Patient is a 65-year-old female who presents today to the urgent care complaining of headache, chills, sinus congestion,  sore throat and cough that is sometimes wet and dry.  Patient reports symptoms started 5 to 7 days ago.  Denies any known fevers.  Admits shortness of breath.  Admits history of asthma that she uses an inhaler for. Denies any history of diabetes.  Denies any current chest pain.  Admits allergies to mold in spots and nuts.    Cough  Associated symptoms include ear pain and a sore throat.         Review of Systems   HENT:  Positive for congestion, ear pain, sinus pressure, sinus pain and sore throat.    Respiratory:  Positive for cough.    Cardiovascular: Negative.    Psychiatric/Behavioral: Negative.       Past Medical History   Past Medical History[1]  Past Surgical History[2]  Family History[3]  she reports that she has never smoked. She has never been exposed to tobacco smoke. She has never used smokeless tobacco. She reports that she does not currently use alcohol. She reports that she does not use drugs.  Current Outpatient Medications   Medication Instructions    acetaminophen (TYLENOL) 650 mg, Oral, Every 8 hours PRN    albuterol (2.5 mg/3 mL) 0.083 % nebulizer solution USE 1 VIAL IN NEBULIZER EVERY 4 HOURS AS NEEDED FOR WHEEZING    albuterol (PROVENTIL HFA,VENTOLIN HFA) 90 mcg/act inhaler     Calcium Acetate, Phos Binder, (CALCIUM ACETATE PO) Oral    cetirizine (ZYRTEC) 10 mg, As needed    denosumab (Prolia) 60 mg/mL as directed Subcutaneous    docusate sodium (COLACE) 100 mg, Oral, 3 times daily PRN    enoxaparin (LOVENOX) 40 mg, Subcutaneous, Daily, Use right or left thigh.    EPINEPHrine (EPIPEN) 0.3 mg/0.3 mL SOAJ No dose, route, or frequency recorded.    fluticasone (FLONASE) 50 mcg/act nasal spray 1 spray, Nasal, Daily    letrozole (FEMARA) 2.5 mg, Oral, Daily    lidocaine-prilocaine (EMLA) cream Apply topically on the skin over the Port-A-Cath as advised    loperamide (IMODIUM) 2 mg, 4 times daily PRN    Naproxen Sodium 220 mg, Oral, 2 times daily    ondansetron (ZOFRAN-ODT) 4 mg, Oral, Every 6 hours  "PRN    oxyCODONE (ROXICODONE) 5 mg, Oral, Every 4 hours PRN    senna (SENOKOT) 17.2 mg, Oral, Daily at bedtime    sodium chloride (OCEAN) 0.65 % nasal spray Saline Nasal Mist    traMADol (ULTRAM) 50 mg, Oral, Every 6 hours PRN    VITAMIN D, CHOLECALCIFEROL, PO Oral   Allergies[4]     Objective   /70   Pulse 84   Temp 98.9 °F (37.2 °C)   Resp 18   SpO2 99%      Physical Exam  Vitals and nursing note reviewed.   Constitutional:       Appearance: Normal appearance.   HENT:      Head: Normocephalic.      Comments: Congestion noted over frontal and maxillary sinus.     Right Ear: Tympanic membrane, ear canal and external ear normal.      Left Ear: Tympanic membrane, ear canal and external ear normal.      Mouth/Throat:      Mouth: Mucous membranes are moist.      Pharynx: Posterior oropharyngeal erythema present.     Eyes:      Pupils: Pupils are equal, round, and reactive to light.       Cardiovascular:      Rate and Rhythm: Normal rate and regular rhythm.      Heart sounds: Normal heart sounds.   Pulmonary:      Breath sounds: Normal breath sounds. No wheezing.     Neurological:      General: No focal deficit present.      Mental Status: She is alert and oriented to person, place, and time.     Psychiatric:         Mood and Affect: Mood normal.         Behavior: Behavior normal.         Portions of the record may have been created with voice recognition software.  Occasional wrong word or \"sound a like\" substitutions may have occurred due to the inherent limitations of voice recognition software.  Read the chart carefully and recognize, using context, where substitutions have occurred.       [1]   Past Medical History:  Diagnosis Date    Abnormal Pap smear of cervix 2010    Followed up following year with normal pap smear    Anemia March 28,2022    Due to infusion therapy?    Asthma 2018 2018 to Jan 2021, i was diagnosed with asthma, jan 2021, i did nit have asthma according to the breathing test the " asthma doctor performed, i get allergiy shot every 2 weeks for 2 different types of molds    Breast cancer (HCC) 09/30/2022    Right    Diarrhea     occasional    Port-A-Cath in place     R side chest    Varicella 1971    I have not had it since then.   [2]   Past Surgical History:  Procedure Laterality Date    BREAST BIOPSY Right 03/29/2022    [facility]    BREAST LUMPECTOMY Right 09/30/2022    Procedure: ML BREAST;  Surgeon: Azalea Motta MD;  Location: [facility];  Service: Surgical Oncology    CATARACT EXTRACTION Left 2019    with lens implant    IR PORT PLACEMENT  04/20/2022    LYMPH NODE BIOPSY Right 09/30/2022    Procedure: SLN BX;  Surgeon: Azalea Motta MD;  Location: [facility];  Service: Surgical Oncology    MANDIBLE SURGERY      jaw surgery for crooked jaw 1993, 1998    HI GRAFTING OF AUTOLOGOUS FAT BY LIPO 50 CC OR LESS Right 3/13/2024    Procedure: FAT GRAFTING TO RIGHT BREAST.;  Surgeon: Zandra Diamond MD;  [facility];  Service: Plastics    HI MASTOPEXY Left 3/13/2024    Procedure: LEFT MASTOPEXY BREAST FOR SYMMETRY;  Surgeon: Zandra Diamond MD;  [facility];  Service: Plastics    HI TENDON SHEATH INCISION Right 11/12/2024    Procedure: Right trigger thumb release;  Surgeon: Arnie Lee MD;  [facility];  Service: Orthopedics    ROTATOR CUFF REPAIR Right 2020    US BREAST CLIP NEEDLE LOC RIGHT Right 03/29/2022    US GUIDED BREAST BIOPSY RIGHT COMPLETE Right 03/29/2022   [3]   Family History  Problem Relation Name Age of Onset    Prostate cancer Father Dad     Asthma Father Dad         He has had asthma previously    No Known Problems Sister      No Known Problems Daughter      No Known Problems Daughter      No Known Problems Maternal Grandmother      No Known Problems Maternal Grandfather      Breast cancer Paternal Grandmother Grandma [place] 80        I was told she had breast cancer in her 80’s.    No Known Problems Paternal Grandfather      Asthma Brother  Brother         Has had asthma previously    Lung cancer Maternal Aunt      No Known Problems Paternal Aunt      Breast cancer Paternal Aunt  80    Stomach cancer Other great aunt (maternal)    [4]   Allergies  Allergen Reactions    Molds & Smuts Hives and Anaphylaxis     Sore throat  Sore throat      Other Other (See Comments), Anaphylaxis and Hives     Mold/gets sore throat    Nuts - Food Allergy Hives     BRAZILIAN NUTS

## 2025-07-18 LAB — BACTERIA THROAT CULT: NORMAL

## 2025-07-29 ENCOUNTER — OFFICE VISIT (OUTPATIENT)
Dept: HEMATOLOGY ONCOLOGY | Facility: CLINIC | Age: 65
End: 2025-07-29
Payer: COMMERCIAL

## 2025-07-29 VITALS
DIASTOLIC BLOOD PRESSURE: 78 MMHG | HEIGHT: 65 IN | WEIGHT: 128.5 LBS | HEART RATE: 87 BPM | BODY MASS INDEX: 21.41 KG/M2 | SYSTOLIC BLOOD PRESSURE: 136 MMHG | TEMPERATURE: 98.4 F | RESPIRATION RATE: 18 BRPM | OXYGEN SATURATION: 94 %

## 2025-07-29 DIAGNOSIS — C77.3 MALIGNANT NEOPLASM METASTATIC TO LYMPH NODE OF AXILLA (HCC): ICD-10-CM

## 2025-07-29 DIAGNOSIS — C50.911 HER2-POSITIVE CARCINOMA OF RIGHT BREAST (HCC): ICD-10-CM

## 2025-07-29 DIAGNOSIS — Z17.0 MALIGNANT NEOPLASM OF CENTRAL PORTION OF RIGHT BREAST IN FEMALE, ESTROGEN RECEPTOR POSITIVE (HCC): Primary | ICD-10-CM

## 2025-07-29 DIAGNOSIS — M81.8 OSTEOPOROSIS DUE TO AROMATASE INHIBITOR: ICD-10-CM

## 2025-07-29 DIAGNOSIS — T38.6X5A OSTEOPOROSIS DUE TO AROMATASE INHIBITOR: ICD-10-CM

## 2025-07-29 DIAGNOSIS — Z17.31 HER2-POSITIVE CARCINOMA OF RIGHT BREAST (HCC): ICD-10-CM

## 2025-07-29 DIAGNOSIS — Z95.828 PORT-A-CATH IN PLACE: ICD-10-CM

## 2025-07-29 DIAGNOSIS — C50.111 MALIGNANT NEOPLASM OF CENTRAL PORTION OF RIGHT BREAST IN FEMALE, ESTROGEN RECEPTOR POSITIVE (HCC): Primary | ICD-10-CM

## 2025-07-29 PROCEDURE — 99214 OFFICE O/P EST MOD 30 MIN: CPT | Performed by: INTERNAL MEDICINE

## 2025-07-29 RX ORDER — LETROZOLE 2.5 MG/1
2.5 TABLET, FILM COATED ORAL DAILY
Qty: 30 TABLET | Refills: 5 | Status: SHIPPED | OUTPATIENT
Start: 2025-07-29

## 2025-07-29 RX ORDER — LIDOCAINE AND PRILOCAINE 25; 25 MG/G; MG/G
CREAM TOPICAL
Qty: 30 G | Refills: 1 | Status: SHIPPED | OUTPATIENT
Start: 2025-07-29

## 2025-07-30 ENCOUNTER — ONCOLOGY SURVIVORSHIP (OUTPATIENT)
Dept: SURGICAL ONCOLOGY | Facility: CLINIC | Age: 65
End: 2025-07-30
Payer: COMMERCIAL

## 2025-07-30 VITALS
HEART RATE: 64 BPM | TEMPERATURE: 98.6 F | SYSTOLIC BLOOD PRESSURE: 116 MMHG | BODY MASS INDEX: 21.33 KG/M2 | DIASTOLIC BLOOD PRESSURE: 78 MMHG | HEIGHT: 65 IN | RESPIRATION RATE: 17 BRPM | OXYGEN SATURATION: 98 % | WEIGHT: 128 LBS

## 2025-07-30 DIAGNOSIS — C50.111 MALIGNANT NEOPLASM OF CENTRAL PORTION OF RIGHT BREAST IN FEMALE, ESTROGEN RECEPTOR POSITIVE (HCC): Primary | ICD-10-CM

## 2025-07-30 DIAGNOSIS — Z79.811 USE OF LETROZOLE (FEMARA): ICD-10-CM

## 2025-07-30 DIAGNOSIS — Z17.0 MALIGNANT NEOPLASM OF CENTRAL PORTION OF RIGHT BREAST IN FEMALE, ESTROGEN RECEPTOR POSITIVE (HCC): Primary | ICD-10-CM

## 2025-07-30 PROCEDURE — 99213 OFFICE O/P EST LOW 20 MIN: CPT | Performed by: NURSE PRACTITIONER

## 2025-07-31 ENCOUNTER — HOSPITAL ENCOUNTER (OUTPATIENT)
Dept: INFUSION CENTER | Facility: CLINIC | Age: 65
Discharge: HOME | End: 2025-07-31
Payer: COMMERCIAL

## 2025-07-31 DIAGNOSIS — M81.8 OSTEOPOROSIS DUE TO AROMATASE INHIBITOR: Primary | ICD-10-CM

## 2025-07-31 DIAGNOSIS — T38.6X5A OSTEOPOROSIS DUE TO AROMATASE INHIBITOR: Primary | ICD-10-CM

## 2025-07-31 PROCEDURE — 96372 THER/PROPH/DIAG INJ SC/IM: CPT

## 2025-07-31 RX ADMIN — DENOSUMAB 60 MG: 60 INJECTION SUBCUTANEOUS at 12:16

## 2025-08-06 ENCOUNTER — ANNUAL EXAM (OUTPATIENT)
Dept: OBGYN CLINIC | Facility: MEDICAL CENTER | Age: 65
End: 2025-08-06
Payer: COMMERCIAL

## 2025-08-06 VITALS
BODY MASS INDEX: 21.16 KG/M2 | WEIGHT: 127 LBS | SYSTOLIC BLOOD PRESSURE: 118 MMHG | HEIGHT: 65 IN | DIASTOLIC BLOOD PRESSURE: 68 MMHG

## 2025-08-06 DIAGNOSIS — Z01.419 ENCOUNTER FOR WELL WOMAN EXAM WITH ROUTINE GYNECOLOGICAL EXAM: Primary | ICD-10-CM

## 2025-08-06 PROCEDURE — G0476 HPV COMBO ASSAY CA SCREEN: HCPCS | Performed by: OBSTETRICS & GYNECOLOGY

## 2025-08-06 PROCEDURE — 99396 PREV VISIT EST AGE 40-64: CPT | Performed by: OBSTETRICS & GYNECOLOGY

## 2025-08-07 LAB
HPV HR 12 DNA CVX QL NAA+PROBE: NEGATIVE
HPV16 DNA CVX QL NAA+PROBE: NEGATIVE
HPV18 DNA CVX QL NAA+PROBE: NEGATIVE

## 2025-08-14 LAB
LAB AP GYN PRIMARY INTERPRETATION: NORMAL
Lab: NORMAL

## (undated) DEVICE — CHLORAPREP HI-LITE 26ML ORANGE

## (undated) DEVICE — SUT PROLENE 4-0 PS-2 18 IN 8682G

## (undated) DEVICE — INTENDED FOR TISSUE SEPARATION, AND OTHER PROCEDURES THAT REQUIRE A SHARP SURGICAL BLADE TO PUNCTURE OR CUT.: Brand: BARD-PARKER ® CARBON RIB-BACK BLADES

## (undated) DEVICE — WET SKIN PREP TRAY: Brand: MEDLINE INDUSTRIES, INC.

## (undated) DEVICE — ELECTRODE BLADE MOD  E-Z CLEAN 6.5IN -0014M

## (undated) DEVICE — BETHLEHEM UNIVERSAL MINOR GEN: Brand: CARDINAL HEALTH

## (undated) DEVICE — DRAPE SURGIKIT SADDLE BAG

## (undated) DEVICE — OCCLUSIVE GAUZE STRIP,3% BISMUTH TRIBROMOPHENATE IN PETROLATUM BLEND: Brand: XEROFORM

## (undated) DEVICE — ADHESIVE SKIN HIGH VISCOSITY EXOFIN 1ML

## (undated) DEVICE — PROXIMATE SKIN STAPLERS (35 WIDE) CONTAINS 35 STAINLESS STEEL STAPLES (FIXED HEAD): Brand: PROXIMATE

## (undated) DEVICE — BULB SYRINGE,IRRIGATION WITH PROTECTIVE CAP: Brand: DOVER

## (undated) DEVICE — INTENDED FOR TISSUE SEPARATION, AND OTHER PROCEDURES THAT REQUIRE A SHARP SURGICAL BLADE TO PUNCTURE OR CUT.: Brand: BARD-PARKER SAFETY BLADES SIZE 15, STERILE

## (undated) DEVICE — SYRINGE 5ML LL

## (undated) DEVICE — SKIN MARKER DUAL TIP WITH RULER CAP, FLEXIBLE RULER AND LABELS: Brand: DEVON

## (undated) DEVICE — GLOVE SRG BIOGEL 8.5

## (undated) DEVICE — PENCIL ELECTROSURG E-Z CLEAN -0035H

## (undated) DEVICE — DISPOSABLE OR TOWEL: Brand: CARDINAL HEALTH

## (undated) DEVICE — GLOVE INDICATOR PI UNDERGLOVE SZ 7 BLUE

## (undated) DEVICE — HARVESTER FAT LIPOGRAFTER KIT

## (undated) DEVICE — ELECTRODE BLADE MOD E-Z CLEAN  2.75IN 7CM -0012AM

## (undated) DEVICE — TUBING INFILTRATION SOFTOUCH PUMP

## (undated) DEVICE — MAYO STAND COVER: Brand: CONVERTORS

## (undated) DEVICE — SUCTION FILTER LIPOSUCTION

## (undated) DEVICE — DRAPE SHEET THREE QUARTER

## (undated) DEVICE — SUT STRATAFIX SPIRAL PLUS 3-0 PS-2 30 X 30 CM SXMP2B408

## (undated) DEVICE — GLOVE SRG BIOGEL 6.5

## (undated) DEVICE — PICO 7 SINGLE 10X20CM: Brand: PICO™ 7

## (undated) DEVICE — SPONGE LAP 18 X 18 IN STRL RFD

## (undated) DEVICE — 3M™ TEGADERM™ TRANSPARENT FILM DRESSING FRAME STYLE, 1627, 4 IN X 10 IN (10 CM X 25 CM), 20/CT 4CT/CASE: Brand: 3M™ TEGADERM™

## (undated) DEVICE — SUT STRATAFIX SPIRAL MONOCRYL PLUS 3-0 PS-2 45CM SXMP1B107

## (undated) DEVICE — 3M™ TEGADERM™ TRANSPARENT FILM DRESSING FRAME STYLE, 1624W, 2-3/8 IN X 2-3/4 IN (6 CM X 7 CM), 100/CT 4CT/CASE: Brand: 3M™ TEGADERM™

## (undated) DEVICE — TELFA NON-ADHERENT ABSORBENT DRESSING: Brand: TELFA

## (undated) DEVICE — GLOVE INDICATOR PI UNDERGLOVE SZ 8 BLUE

## (undated) DEVICE — GLOVE SRG BIOGEL 8

## (undated) DEVICE — NEEDLE 18 G X 1 1/2 SAFETY

## (undated) DEVICE — SPONGE LAP 18 X 18 IN

## (undated) DEVICE — TELFA ADHESIVE ISLAND DRESSING: Brand: TELFA

## (undated) DEVICE — GAUZE SPONGES,16 PLY: Brand: CURITY

## (undated) DEVICE — PLASMABLADE PS200-040 4.0: Brand: PLASMABLADE™

## (undated) DEVICE — SUT MONOCRYL 4-0 PS-2 27 IN Y426H

## (undated) DEVICE — NEEDLE 25G X 1 1/2

## (undated) DEVICE — LINER SUCTION CANISTER 2000ML DISP

## (undated) DEVICE — PLUMEPEN PRO 10FT

## (undated) DEVICE — GLOVE SRG BIOGEL 7

## (undated) DEVICE — PACK UNIVERSAL DRAPES SUB-Q ICD

## (undated) DEVICE — TRAY FOLEY 16FR URIMETER SURESTEP

## (undated) DEVICE — ACE WRAP 6 IN STERILE

## (undated) DEVICE — SUT MONOCRYL 3-0 SH 27 IN Y416H

## (undated) DEVICE — 3M™ STERI-STRIP™ REINFORCED ADHESIVE SKIN CLOSURES, R1547, 1/2 IN X 4 IN (12 MM X 100 MM), 6 STRIPS/ENVELOPE: Brand: 3M™ STERI-STRIP™

## (undated) DEVICE — GLOVE SRG BIOGEL 7.5

## (undated) DEVICE — PREP PAD BNS: Brand: CONVERTORS

## (undated) DEVICE — ABDOMINAL PAD: Brand: DERMACEA

## (undated) DEVICE — BETHLEHEM UNIVERSAL BREAST PK: Brand: CARDINAL HEALTH